# Patient Record
Sex: FEMALE | Race: WHITE | NOT HISPANIC OR LATINO | Employment: FULL TIME | URBAN - METROPOLITAN AREA
[De-identification: names, ages, dates, MRNs, and addresses within clinical notes are randomized per-mention and may not be internally consistent; named-entity substitution may affect disease eponyms.]

---

## 2018-11-22 ENCOUNTER — APPOINTMENT (OUTPATIENT)
Dept: CT IMAGING | Facility: CLINIC | Age: 48
End: 2018-11-22
Attending: EMERGENCY MEDICINE
Payer: COMMERCIAL

## 2018-11-22 ENCOUNTER — HOSPITAL ENCOUNTER (EMERGENCY)
Facility: CLINIC | Age: 48
Discharge: HOME | End: 2018-11-22
Attending: EMERGENCY MEDICINE | Admitting: EMERGENCY MEDICINE
Payer: COMMERCIAL

## 2018-11-22 VITALS
HEIGHT: 67 IN | OXYGEN SATURATION: 96 % | SYSTOLIC BLOOD PRESSURE: 123 MMHG | TEMPERATURE: 98.1 F | DIASTOLIC BLOOD PRESSURE: 80 MMHG | HEART RATE: 57 BPM | WEIGHT: 140 LBS | BODY MASS INDEX: 21.97 KG/M2

## 2018-11-22 DIAGNOSIS — N23 RENAL COLIC ON RIGHT SIDE: ICD-10-CM

## 2018-11-22 DIAGNOSIS — N20.1 URETEROLITHIASIS: ICD-10-CM

## 2018-11-22 LAB
ALBUMIN SERPL-MCNC: 4.3 G/DL (ref 3.4–5)
ALBUMIN UR-MCNC: 100 MG/DL
ALP SERPL-CCNC: 43 U/L (ref 40–150)
ALT SERPL W P-5'-P-CCNC: 16 U/L (ref 0–50)
ANION GAP SERPL CALCULATED.3IONS-SCNC: 6 MMOL/L (ref 3–14)
APPEARANCE UR: ABNORMAL
AST SERPL W P-5'-P-CCNC: 16 U/L (ref 0–45)
BACTERIA #/AREA URNS HPF: ABNORMAL /HPF
BASOPHILS # BLD AUTO: 0.1 10E9/L (ref 0–0.2)
BASOPHILS NFR BLD AUTO: 0.8 %
BILIRUB SERPL-MCNC: 0.3 MG/DL (ref 0.2–1.3)
BILIRUB UR QL STRIP: NEGATIVE
BUN SERPL-MCNC: 18 MG/DL (ref 7–30)
CALCIUM SERPL-MCNC: 8.4 MG/DL (ref 8.5–10.1)
CHLORIDE SERPL-SCNC: 105 MMOL/L (ref 94–109)
CO2 SERPL-SCNC: 29 MMOL/L (ref 20–32)
COLOR UR AUTO: YELLOW
CREAT SERPL-MCNC: 1.01 MG/DL (ref 0.52–1.04)
DIFFERENTIAL METHOD BLD: NORMAL
EOSINOPHIL # BLD AUTO: 0 10E9/L (ref 0–0.7)
EOSINOPHIL NFR BLD AUTO: 0.5 %
ERYTHROCYTE [DISTWIDTH] IN BLOOD BY AUTOMATED COUNT: 12.4 % (ref 10–15)
GFR SERPL CREATININE-BSD FRML MDRD: 59 ML/MIN/1.7M2
GLUCOSE SERPL-MCNC: 109 MG/DL (ref 70–99)
GLUCOSE UR STRIP-MCNC: NEGATIVE MG/DL
HCT VFR BLD AUTO: 38.3 % (ref 35–47)
HGB BLD-MCNC: 12.6 G/DL (ref 11.7–15.7)
HGB UR QL STRIP: ABNORMAL
IMM GRANULOCYTES # BLD: 0 10E9/L (ref 0–0.4)
IMM GRANULOCYTES NFR BLD: 0.3 %
KETONES UR STRIP-MCNC: 5 MG/DL
LEUKOCYTE ESTERASE UR QL STRIP: ABNORMAL
LYMPHOCYTES # BLD AUTO: 1.9 10E9/L (ref 0.8–5.3)
LYMPHOCYTES NFR BLD AUTO: 24.5 %
MCH RBC QN AUTO: 30.7 PG (ref 26.5–33)
MCHC RBC AUTO-ENTMCNC: 32.9 G/DL (ref 31.5–36.5)
MCV RBC AUTO: 93 FL (ref 78–100)
MONOCYTES # BLD AUTO: 0.4 10E9/L (ref 0–1.3)
MONOCYTES NFR BLD AUTO: 5.4 %
MUCOUS THREADS #/AREA URNS LPF: PRESENT /LPF
NEUTROPHILS # BLD AUTO: 5.3 10E9/L (ref 1.6–8.3)
NEUTROPHILS NFR BLD AUTO: 68.5 %
NITRATE UR QL: NEGATIVE
NRBC # BLD AUTO: 0 10*3/UL
NRBC BLD AUTO-RTO: 0 /100
PH UR STRIP: 5 PH (ref 5–7)
PLATELET # BLD AUTO: 207 10E9/L (ref 150–450)
POTASSIUM SERPL-SCNC: 3.6 MMOL/L (ref 3.4–5.3)
PROT SERPL-MCNC: 7.4 G/DL (ref 6.8–8.8)
RBC # BLD AUTO: 4.1 10E12/L (ref 3.8–5.2)
RBC #/AREA URNS AUTO: >182 /HPF (ref 0–2)
SODIUM SERPL-SCNC: 140 MMOL/L (ref 133–144)
SOURCE: ABNORMAL
SP GR UR STRIP: 1.03 (ref 1–1.03)
SQUAMOUS #/AREA URNS AUTO: 1 /HPF (ref 0–1)
UROBILINOGEN UR STRIP-MCNC: 2 MG/DL (ref 0–2)
WBC # BLD AUTO: 7.8 10E9/L (ref 4–11)
WBC #/AREA URNS AUTO: 10 /HPF (ref 0–5)

## 2018-11-22 PROCEDURE — 80053 COMPREHEN METABOLIC PANEL: CPT | Performed by: EMERGENCY MEDICINE

## 2018-11-22 PROCEDURE — 25000128 H RX IP 250 OP 636

## 2018-11-22 PROCEDURE — 96375 TX/PRO/DX INJ NEW DRUG ADDON: CPT

## 2018-11-22 PROCEDURE — 99285 EMERGENCY DEPT VISIT HI MDM: CPT | Mod: 25

## 2018-11-22 PROCEDURE — 25000132 ZZH RX MED GY IP 250 OP 250 PS 637: Performed by: EMERGENCY MEDICINE

## 2018-11-22 PROCEDURE — 81001 URINALYSIS AUTO W/SCOPE: CPT | Performed by: EMERGENCY MEDICINE

## 2018-11-22 PROCEDURE — 96361 HYDRATE IV INFUSION ADD-ON: CPT

## 2018-11-22 PROCEDURE — 74176 CT ABD & PELVIS W/O CONTRAST: CPT

## 2018-11-22 PROCEDURE — 85025 COMPLETE CBC W/AUTO DIFF WBC: CPT | Performed by: EMERGENCY MEDICINE

## 2018-11-22 PROCEDURE — 25000128 H RX IP 250 OP 636: Performed by: EMERGENCY MEDICINE

## 2018-11-22 PROCEDURE — 96374 THER/PROPH/DIAG INJ IV PUSH: CPT

## 2018-11-22 RX ORDER — MORPHINE SULFATE 4 MG/ML
4 INJECTION, SOLUTION INTRAMUSCULAR; INTRAVENOUS
Status: DISCONTINUED | OUTPATIENT
Start: 2018-11-22 | End: 2018-11-22 | Stop reason: HOSPADM

## 2018-11-22 RX ORDER — ONDANSETRON 4 MG/1
4 TABLET, ORALLY DISINTEGRATING ORAL EVERY 6 HOURS PRN
Qty: 12 TABLET | Refills: 0 | Status: SHIPPED | OUTPATIENT
Start: 2018-11-22 | End: 2018-11-25

## 2018-11-22 RX ORDER — KETOROLAC TROMETHAMINE 15 MG/ML
15 INJECTION, SOLUTION INTRAMUSCULAR; INTRAVENOUS ONCE
Status: COMPLETED | OUTPATIENT
Start: 2018-11-22 | End: 2018-11-22

## 2018-11-22 RX ORDER — ONDANSETRON 2 MG/ML
4 INJECTION INTRAMUSCULAR; INTRAVENOUS
Status: COMPLETED | OUTPATIENT
Start: 2018-11-22 | End: 2018-11-22

## 2018-11-22 RX ORDER — OXYCODONE AND ACETAMINOPHEN 5; 325 MG/1; MG/1
1-2 TABLET ORAL EVERY 4 HOURS PRN
Qty: 15 TABLET | Refills: 0 | Status: SHIPPED | OUTPATIENT
Start: 2018-11-22 | End: 2023-04-03

## 2018-11-22 RX ORDER — ONDANSETRON 2 MG/ML
INJECTION INTRAMUSCULAR; INTRAVENOUS
Status: COMPLETED
Start: 2018-11-22 | End: 2018-11-22

## 2018-11-22 RX ORDER — KETOROLAC TROMETHAMINE 15 MG/ML
INJECTION, SOLUTION INTRAMUSCULAR; INTRAVENOUS
Status: COMPLETED
Start: 2018-11-22 | End: 2018-11-22

## 2018-11-22 RX ORDER — MORPHINE SULFATE 4 MG/ML
INJECTION, SOLUTION INTRAMUSCULAR; INTRAVENOUS
Status: COMPLETED
Start: 2018-11-22 | End: 2018-11-22

## 2018-11-22 RX ORDER — TAMSULOSIN HYDROCHLORIDE 0.4 MG/1
0.4 CAPSULE ORAL ONCE
Status: COMPLETED | OUTPATIENT
Start: 2018-11-22 | End: 2018-11-22

## 2018-11-22 RX ADMIN — KETOROLAC TROMETHAMINE 15 MG: 15 INJECTION, SOLUTION INTRAMUSCULAR; INTRAVENOUS at 01:09

## 2018-11-22 RX ADMIN — MORPHINE SULFATE 4 MG: 4 INJECTION, SOLUTION INTRAMUSCULAR; INTRAVENOUS at 01:09

## 2018-11-22 RX ADMIN — ONDANSETRON 4 MG: 2 INJECTION INTRAMUSCULAR; INTRAVENOUS at 01:09

## 2018-11-22 RX ADMIN — SODIUM CHLORIDE 1000 ML: 9 INJECTION, SOLUTION INTRAVENOUS at 01:08

## 2018-11-22 RX ADMIN — MORPHINE SULFATE 4 MG: 4 INJECTION INTRAVENOUS at 01:09

## 2018-11-22 RX ADMIN — TAMSULOSIN HYDROCHLORIDE 0.4 MG: 0.4 CAPSULE ORAL at 02:21

## 2018-11-22 ASSESSMENT — ENCOUNTER SYMPTOMS
VOMITING: 1
ABDOMINAL PAIN: 1
FREQUENCY: 1
DIARRHEA: 0
CONSTIPATION: 0

## 2018-11-22 NOTE — ED PROVIDER NOTES
"  History     Chief Complaint:  Abdominal Pain      HPI   Jenni Torres is a 48 year old female who presents with her  for evaluation of abdominal pain. Per the patient's reports, she experienced sudden onset of right lower quadrant pain around 10 PM. Patient has been vomiting since the onset of her pain and has had frequency as well. She denies diarrhea, constipation, or abnormal vaginal bleeding/discharge. Of note, the patient is currently in her menstrual cycle. Patient denies having abdominal surgeries and cannot find a comfortable position to sit.       Allergies:  No known drug allergies     Medications:    Keppra  Synthroid  Sertraline    Past Medical History:    Hyperthyroidism   Seizures    Past Surgical History:    History reviewed. No pertinent surgical history.    Family History:    History reviewed. No pertinent family history.     Social History:  Smoking status: Never smoker  Alcohol use: No  Marital Status:          Review of Systems   Gastrointestinal: Positive for abdominal pain and vomiting. Negative for constipation and diarrhea.   Genitourinary: Positive for frequency. Negative for vaginal bleeding and vaginal discharge.   All other systems reviewed and are negative.      Physical Exam     Patient Vitals for the past 24 hrs:   BP Temp Temp src Heart Rate SpO2 Height Weight   11/22/18 0055 123/80 - - - - - -   11/22/18 0052 - - - 57 100 % - -   11/22/18 0051 - 98.1  F (36.7  C) Oral - - 1.702 m (5' 7\") 63.5 kg (140 lb)       Physical Exam  Nursing note and vitals reviewed.  Constitutional: Cooperative, diaphoretic, uncomfortable appearing   HENT:   Mouth/Throat: Mucous membranes are normal.   Cardiovascular: Normal rate, regular rhythm and normal heart sounds.  No murmur.  Pulmonary/Chest: Effort normal and breath sounds normal. No respiratory distress. No wheezes. No rales.   Abdominal: Soft. Normal appearance. No distension. There is no tenderness.   Neurological: Alert. Oriented " x4  Skin: Skin is warm and dry.    Psychiatric: Normal mood and affect.     Emergency Department Course     Imaging:  Radiographic findings were communicated with the patient who voiced understanding of the findings.    CT abdomen pelvis without contrast (stone protocol)  IMPRESSION:  1. Mild right hydronephrosis due to a 0.3 cm UVJ stone.  2. Incidental tiny left lung nodule of doubtful significance, though technically indeterminate. See below.    Laboratory:  CBC: WNL (WBC 7.8, HGB 12.6, )  CMP: Glucose 109 (H), GFR 59 (L), Calcium 8.4 (L), (Creatinine 1.01)    UA: Urine blood large, Protein albumin 100, Urineketon 5, Leukocyte esterase trace, WBC/HPF 10 (H), RBC/HPF >182 (H), Bacteria few, Mucous present, o/w negative.     Interventions:  0108 NaCl BOLUS 1000 ml IV  0109 Morphine 4 mg IV  0109 Toradol 15 mg IV  0109 Zofran 4 mg IV    Emergency Department Course:  Past medical records, nursing notes, and vitals reviewed.  0051: I performed an exam of the patient and obtained history, as documented above.    IV inserted and blood drawn.    The patient was sent for a CT abdomen pelvis without contrast while in the emergency department, findings above.    0320: I rechecked the patient. Findings and plan explained to the Patient. Patient discharged home with instructions regarding supportive care, medications, and reasons to return. The importance of close follow-up was reviewed.       Impression & Plan      Medical Decision Making:  Jenni Torres is a 48 year old female who presents with flank pain.  A broad differential diagnosis was considered including diverticulitis, ureterolithiasis, tumor, colitis, cholecystitis, aneurysm, dissection, volvulus, appendicitis, splenic issue, stomach pathology, ulcer, hydronephrosis, pneumonia, rib fracture, UTI, pyelonephritis, ectopic, ovarian cyst or torsion and pregnancy amongst many other etiologies.  Signs and symptoms consistent with ureterolithiasis.  Patients pain  is controlled in ED and given flomax.  No signs of infected stone.  Patient is hemodynamically stable in ED. Plan is home with abdominal pain recheck by primary care physician or return to ED if symptoms worsen.  Return for fevers greater than 102, increasing pain, other new symptoms develop.  Ureterolithiasis precautions for home.  Questions were answered.       Diagnosis:    ICD-10-CM    1. Renal colic on right side N23    2. Ureterolithiasis N20.1        Disposition:  discharged to home    Discharge Medications:  New Prescriptions    ONDANSETRON (ZOFRAN ODT) 4 MG ODT TAB    Take 1 tablet (4 mg) by mouth every 6 hours as needed for nausea    OXYCODONE-ACETAMINOPHEN (PERCOCET) 5-325 MG TABLET    Take 1-2 tablets by mouth every 4 hours as needed for pain         Dwayne Earl  11/22/2018   [facility] EMERGENCY DEPARTMENT    Scribe Disclosure:  I, Dwayne Earl, am serving as a scribe at 12:51 AM on 11/22/2018 to document services personally performed by Vikram Don MD based on my observations and the provider's statements to me.        Vikram Don MD  11/22/18 0414

## 2018-11-22 NOTE — ED AVS SNAPSHOT
[facility] Emergency Department    [address]    [facility] [address]    Phone:  [phone]    Fax:  [phone]                                       Jenni Torres   MRN: [id number]    Department:  [facility] Emergency Department   Date of Visit:  11/22/2018           After Visit Summary Signature Page     I have received my discharge instructions, and my questions have been answered. I have discussed any challenges I see with this plan with the nurse or doctor.    ..........................................................................................................................................  Patient/Patient Representative Signature      ..........................................................................................................................................  Patient Representative Print Name and Relationship to Patient    ..................................................               ................................................  Date                                   Time    ..........................................................................................................................................  Reviewed by Signature/Title    ...................................................              ..............................................  Date                                               Time          22EPIC Rev 08/18

## 2018-11-22 NOTE — ED AVS SNAPSHOT
[facility] Emergency Department    [address] [place] [address]    Phone:  [phone]    Fax:  [phone]                                       Jenni Torres   MRN: [id number]    Department:  [facility] Emergency Department   Date of Visit:  11/22/2018           Patient Information     Date Of Birth          1970        Your diagnoses for this visit were:     Renal colic on right side     Ureterolithiasis        You were seen by Vikram Don MD.      Follow-up Information     Follow up with [facility]    Why:  As needed    Contact information:    [address] [facility]  [place] [address]  [phone]          Discharge Instructions       Discharge Instructions  Kidney Stones    Kidney stones are a common problem that can cause a lot of pain but fortunately are usually not dangerous. Kidney stones form in the kidney and then can cause a blockage (obstruction) of the flow of urine from the kidney which leads to pain. Most patients can manage kidney stones at home (without a hospital stay).  However, sometimes your condition may be worse than it seemed at first, or may get worse with time. Most kidney stones will pass on their own, but occasionally stones may need to be removed by an urologist.    Generally, every Emergency Department visit should have a follow-up clinic visit with either a primary or a specialty clinic/provider. Please follow-up as instructed by your emergency provider today.      Return to the Emergency Department if:    Your pain is not controlled despite the medications provided or recommended.    You are vomiting (throwing up) and cannot keep fluids or medications down.    You develop a fever (>100.4 F).    You feel much more ill or develop new symptoms.  What can I do to help myself?    Be sure to drink plenty of fluids.    If instructed to do so, strain your urine (pee) with the urine strainer you were provided with today. Your  stone may look like a grain of sand or a small pebble. Collect any stones in the cup provided and bring to your follow-up appointment.    Staying active is good, and may help the stone to pass. You may do whatever you feel up to doing without restrictions.   Treatment:    Non-steroidal anti-inflammatory drugs (NSAIDs). This includes prescription medicines like Toradol  (ketorolac) and non-prescription medicines like Advil  (ibuprofen) and Nuprin  (ibuprofen) and Naproxen. These pain relievers are very effective for kidney stones.    Nausea (sick to your stomach) medication.  Nausea and vomiting are common with kidney stones, so your provider may send you home with medicine for this.     Flomax  (tamsulosin). This medicine is sometimes used for men with prostate problems, but also can help kidney stones to pass. Its effectiveness is controversial or questionable so it is prescribed in certain situations. This medicine can lower blood pressure, and you may feel faint/lightheaded, especially when you first stand up. Be sure to get up gradually, sit down if you feel faint, and avoid activity where feeling faint would be dangerous, such as climbing ladders.  If you were given a prescription for medicine here today, be sure to read all of the information (including the package insert) that comes with your prescription.  This will include important information about the medicine, its side effects, and any warnings that you need to know about.  The pharmacist who fills the prescription can provide more information and answer questions you may have about the medicine.  If you have questions or concerns that the pharmacist cannot address, please call or return to the Emergency Department.   Remember that you can always come back to the Emergency Department if you are not able to see your regular provider in the amount of time listed above, if you get any new symptoms, or if there is anything that worries you.      24 Hour  Appointment Hotline       To make an appointment at any [facility], call [phone] ([phone]). If you don't have a family doctor or clinic, we will help you find one. [facility] clinics are conveniently located to serve the needs of you and your family.             Review of your medicines      START taking        Dose / Directions Last dose taken    ondansetron 4 MG ODT tab   Commonly known as:  ZOFRAN ODT   Dose:  4 mg   Quantity:  12 tablet        Take 1 tablet (4 mg) by mouth every 6 hours as needed for nausea   Refills:  0        oxyCODONE-acetaminophen 5-325 MG tablet   Commonly known as:  PERCOCET   Dose:  1-2 tablet   Quantity:  15 tablet        Take 1-2 tablets by mouth every 4 hours as needed for pain   Refills:  0          Our records show that you are taking the medicines listed below. If these are incorrect, please call your family doctor or clinic.        Dose / Directions Last dose taken    KEPPRA PO        Refills:  0        SERTRALINE HCL PO        Take by mouth daily   Refills:  0        SYNTHROID PO        Refills:  0                Information about OPIOIDS     PRESCRIPTION OPIOIDS: WHAT YOU NEED TO KNOW   We gave you an opioid (narcotic) pain medicine. It is important to manage your pain, but opioids are not always the best choice. You should first try all the other options your care team gave you. Take this medicine for as short a time (and as few doses) as possible.    Some activities can increase your pain, such as bandage changes or therapy sessions. It may help to take your pain medicine 30 to 60 minutes before these activities. Reduce your stress by getting enough sleep, working on hobbies you enjoy and practicing relaxation or meditation. Talk to your care team about ways to manage your pain beyond prescription opioids.    These medicines have risks:    DO NOT drive when on new or higher doses of pain medicine. These medicines can affect your alertness and reaction times,  and you could be arrested for driving under the influence (DUI). If you need to use opioids long-term, talk to your care team about driving.    DO NOT operate heavy machinery    DO NOT do any other dangerous activities while taking these medicines.    DO NOT drink any alcohol while taking these medicines.     If the opioid prescribed includes acetaminophen, DO NOT take with any other medicines that contain acetaminophen. Read all labels carefully. Look for the word  acetaminophen  or  Tylenol.  Ask your pharmacist if you have questions or are unsure.    You can get addicted to pain medicines, especially if you have a history of addiction (chemical, alcohol or substance dependence). Talk to your care team about ways to reduce this risk.    All opioids tend to cause constipation. Drink plenty of water and eat foods that have a lot of fiber, such as fruits, vegetables, prune juice, apple juice and high-fiber cereal. Take a laxative (Miralax, milk of magnesia, Colace, Senna) if you don t move your bowels at least every other day. Other side effects include upset stomach, sleepiness, dizziness, throwing up, tolerance (needing more of the medicine to have the same effect), physical dependence and slowed breathing.    Store your pills in a secure place, locked if possible. We will not replace any lost or stolen medicine. If you don t finish your medicine, please throw away (dispose) as directed by your pharmacist. The [place] Pollution [employer] has more information about safe disposal: [email or web address]://[email or web address]/living-green/[email or web address]-medications        Prescriptions were sent or printed at these locations (2 Prescriptions)                   Other Prescriptions                Printed at Department/Unit printer (2 of 2)         ondansetron (ZOFRAN ODT) 4 MG ODT tab               oxyCODONE-acetaminophen (PERCOCET) 5-325 MG tablet                Procedures and tests performed during your visit     CBC with  platelets differential    CT Abdomen Pelvis without Contrast (stone protocol)    Comprehensive metabolic panel    Peripheral IV: Standard    Pulse oximetry nursing    UA with Microscopic      Orders Needing Specimen Collection     None      Pending Results     No orders found from 11/20/2018 to 11/23/2018.            Pending Culture Results     No orders found from 11/20/2018 to 11/23/2018.            Pending Results Instructions     If you had any lab results that were not finalized at the time of your Discharge, you can call the ED Lab Result RN at [phone]. You will be contacted by this team for any positive Lab results or changes in treatment. The nurses are available 7 days a week from 10A to 6:30P.  You can leave a message 24 hours per day and they will return your call.        Test Results From Your Hospital Stay        11/22/2018  3:10 AM      Component Results     Component Value Ref Range & Units Status    Color Urine Yellow  Final    Appearance Urine Slightly Cloudy  Final    Glucose Urine Negative NEG^Negative mg/dL Final    Bilirubin Urine Negative NEG^Negative Final    Ketones Urine 5 (A) NEG^Negative mg/dL Final    Specific Gravity Urine 1.030 1.003 - 1.035 Final    Blood Urine Large (A) NEG^Negative Final    pH Urine 5.0 5.0 - 7.0 pH Final    Protein Albumin Urine 100 (A) NEG^Negative mg/dL Final    Urobilinogen mg/dL 2.0 0.0 - 2.0 mg/dL Final    Nitrite Urine Negative NEG^Negative Final    Leukocyte Esterase Urine Trace (A) NEG^Negative Final    Source Midstream Urine  Final    WBC Urine 10 (H) 0 - 5 /HPF Final    RBC Urine >182 (H) 0 - 2 /HPF Final    Bacteria Urine Few (A) NEG^Negative /HPF Final    Squamous Epithelial /HPF Urine 1 0 - 1 /HPF Final    Mucous Urine Present (A) NEG^Negative /LPF Final         11/22/2018  1:28 AM      Component Results     Component Value Ref Range & Units Status    WBC 7.8 4.0 - 11.0 10e9/L Final    RBC Count 4.10 3.8 - 5.2 10e12/L Final    Hemoglobin 12.6 11.7  - 15.7 g/dL Final    Hematocrit 38.3 35.0 - 47.0 % Final    MCV 93 78 - 100 fl Final    MCH 30.7 26.5 - 33.0 pg Final    MCHC 32.9 31.5 - 36.5 g/dL Final    RDW 12.4 10.0 - 15.0 % Final    Platelet Count 207 150 - 450 10e9/L Final    Diff Method Automated Method  Final    % Neutrophils 68.5 % Final    % Lymphocytes 24.5 % Final    % Monocytes 5.4 % Final    % Eosinophils 0.5 % Final    % Basophils 0.8 % Final    % Immature Granulocytes 0.3 % Final    Nucleated RBCs 0 0 /100 Final    Absolute Neutrophil 5.3 1.6 - 8.3 10e9/L Final    Absolute Lymphocytes 1.9 0.8 - 5.3 10e9/L Final    Absolute Monocytes 0.4 0.0 - 1.3 10e9/L Final    Absolute Eosinophils 0.0 0.0 - 0.7 10e9/L Final    Absolute Basophils 0.1 0.0 - 0.2 10e9/L Final    Abs Immature Granulocytes 0.0 0 - 0.4 10e9/L Final    Absolute Nucleated RBC 0.0  Final         11/22/2018  1:45 AM      Component Results     Component Value Ref Range & Units Status    Sodium 140 133 - 144 mmol/L Final    Potassium 3.6 3.4 - 5.3 mmol/L Final    Chloride 105 94 - 109 mmol/L Final    Carbon Dioxide 29 20 - 32 mmol/L Final    Anion Gap 6 3 - 14 mmol/L Final    Glucose 109 (H) 70 - 99 mg/dL Final    Urea Nitrogen 18 7 - 30 mg/dL Final    Creatinine 1.01 0.52 - 1.04 mg/dL Final    GFR Estimate 59 (L) >60 mL/min/1.7m2 Final    Non  GFR Calc    GFR Estimate If Black 71 >60 mL/min/1.7m2 Final    African American GFR Calc    Calcium 8.4 (L) 8.5 - 10.1 mg/dL Final    Bilirubin Total 0.3 0.2 - 1.3 mg/dL Final    Albumin 4.3 3.4 - 5.0 g/dL Final    Protein Total 7.4 6.8 - 8.8 g/dL Final    Alkaline Phosphatase 43 40 - 150 U/L Final    ALT 16 0 - 50 U/L Final    AST 16 0 - 45 U/L Final         11/22/2018  2:18 AM      Narrative     CT ABDOMEN PELVIS W/O CONTRAST  11/22/2018 1:57 AM     INDICATION: Abdominal pain - right flank.       TECHNIQUE: Thin axial images through the abdomen and pelvis without  contrast. Coronal reformatted images. Radiation dose for this scan  was  reduced using automated exposure control, adjustment of the mA and/or  kV according to patient size, or iterative reconstruction technique.    COMPARISON: None.    FINDINGS: Mild right hydronephrosis due to a 0.3 cm UVJ stone. No  left-sided urinary calculi. The upper abdominal organs are otherwise  negative without contrast.    Tiny 0.2 cm left lower lung nodule (series 2, image 1).    Uterus is present. No suspicious pelvic masses. No bowel obstruction  or other acute findings. No destructive bone lesions.        Impression     IMPRESSION:  1. Mild right hydronephrosis due to a 0.3 cm UVJ stone.  2. Incidental tiny left lung nodule of doubtful significance, though  technically indeterminate. See below.    Recommendations for one or multiple incidental lung nodules < 6mm :    Low risk patients: No routine follow-up.    High risk patients: Optional follow-up CT at twelve months; if  unchanged, no further follow-up.    *Low Risk: Minimal or absent history of smoking or other known risk  factors.  *Nonsolid (ground-glass) or partly solid nodules may require longer  follow-up to exclude indolent adenocarcinoma.  *Recommendations based on Guidelines for the Management of Incidental  Pulmonary Nodules Detected at CT: From the Fleischner Society 2017,  Radiology 2017.    MARCELLA SHETH MD                Clinical Quality Measure: Blood Pressure Screening     Your blood pressure was checked while you were in the emergency department today. The last reading we obtained was  BP: 123/80 . Please read the guidelines below about what these numbers mean and what you should do about them.  If your systolic blood pressure (the top number) is less than 120 and your diastolic blood pressure (the bottom number) is less than 80, then your blood pressure is normal. There is nothing more that you need to do about it.  If your systolic blood pressure (the top number) is 120-139 or your diastolic blood pressure (the bottom number)  "is 80-89, your blood pressure may be higher than it should be. You should have your blood pressure rechecked within a year by a primary care provider.  If your systolic blood pressure (the top number) is 140 or greater or your diastolic blood pressure (the bottom number) is 90 or greater, you may have high blood pressure. High blood pressure is treatable, but if left untreated over time it can put you at risk for heart attack, stroke, or kidney failure. You should have your blood pressure rechecked by a primary care provider within the next 4 weeks.  If your provider in the emergency department today gave you specific instructions to follow-up with your doctor or provider even sooner than that, you should follow that instruction and not wait for up to 4 weeks for your follow-up visit.        Thank you for choosing [facility]       Thank you for choosing [facility] for your care. Our goal is always to provide you with excellent care. Hearing back from our patients is one way we can continue to improve our services. Please take a few minutes to complete the written survey that you may receive in the mail after you visit with us. Thank you!        [employer] Information     [employer] lets you send messages to your doctor, view your test results, renew your prescriptions, schedule appointments and more. To sign up, go to [email or web address] . Click on \"Log in\" on the left side of the screen, which will take you to the Welcome page. Then click on \"Sign up Now\" on the right side of the page.     You will be asked to enter the access code listed below, as well as some personal information. Please follow the directions to create your username and password.     Your access code is: [email or web address]  Expires: 2019  3:20 AM     Your access code will  in 90 days. If you need help or a new code, please call your [facility] clinic or [phone].        Care EveryWhere ID     This is your Care EveryWhere ID. This could be used " by other organizations to access your [facility] medical records  [id number]        Equal Access to Services     YESICA CLARKE : Dileep Leigh, veronique bermudez, monica garrett. So [facility] [phone].    ATENCIÓN: Si habla español, tiene a marks disposición servicios gratuitos de asistencia lingüística. Llame al [phone].    We comply with applicable federal civil rights laws and [place] laws. We do not discriminate on the basis of race, color, national origin, age, disability, sex, sexual orientation, or gender identity.            After Visit Summary       This is your record. Keep this with you and show to your community pharmacist(s) and doctor(s) at your next visit.

## 2018-11-22 NOTE — ED NOTES
Gave pt urine strainer. Discussed medications and importance of drinking lots of fluids. Pt and  understand teaching

## 2018-11-22 NOTE — ED NOTES
Bladder scan 0mL at this time. Pt given water to drink and IV fluids infusing quickly. Will attempt to collect urine later.

## 2021-09-28 ENCOUNTER — TRANSFERRED RECORDS (OUTPATIENT)
Dept: MULTI SPECIALTY CLINIC | Facility: CLINIC | Age: 51
End: 2021-09-28

## 2021-09-28 LAB
HPV ABSTRACT: NORMAL
PAP-ABSTRACT: NORMAL

## 2022-08-01 ENCOUNTER — TRANSFERRED RECORDS (OUTPATIENT)
Dept: MULTI SPECIALTY CLINIC | Facility: CLINIC | Age: 52
End: 2022-08-01

## 2023-04-03 ENCOUNTER — OFFICE VISIT (OUTPATIENT)
Dept: PEDIATRICS | Facility: CLINIC | Age: 53
End: 2023-04-03
Payer: COMMERCIAL

## 2023-04-03 VITALS
RESPIRATION RATE: 20 BRPM | BODY MASS INDEX: 21.2 KG/M2 | HEIGHT: 68 IN | HEART RATE: 62 BPM | WEIGHT: 139.9 LBS | SYSTOLIC BLOOD PRESSURE: 95 MMHG | TEMPERATURE: 98.1 F | DIASTOLIC BLOOD PRESSURE: 60 MMHG | OXYGEN SATURATION: 98 %

## 2023-04-03 DIAGNOSIS — Z13.220 LIPID SCREENING: ICD-10-CM

## 2023-04-03 DIAGNOSIS — R56.9 PARTIAL SEIZURES (H): ICD-10-CM

## 2023-04-03 DIAGNOSIS — E03.9 ACQUIRED HYPOTHYROIDISM: ICD-10-CM

## 2023-04-03 DIAGNOSIS — Z00.00 ROUTINE GENERAL MEDICAL EXAMINATION AT A HEALTH CARE FACILITY: Primary | ICD-10-CM

## 2023-04-03 DIAGNOSIS — H93.13 TINNITUS OF BOTH EARS: ICD-10-CM

## 2023-04-03 DIAGNOSIS — Z30.09 GENERAL COUNSELING FOR PRESCRIPTION OF ORAL CONTRACEPTIVES: ICD-10-CM

## 2023-04-03 DIAGNOSIS — Z11.59 NEED FOR HEPATITIS C SCREENING TEST: ICD-10-CM

## 2023-04-03 DIAGNOSIS — Z12.31 VISIT FOR SCREENING MAMMOGRAM: ICD-10-CM

## 2023-04-03 DIAGNOSIS — F33.0 MILD EPISODE OF RECURRENT MAJOR DEPRESSIVE DISORDER (H): ICD-10-CM

## 2023-04-03 DIAGNOSIS — Z11.4 SCREENING FOR HIV (HUMAN IMMUNODEFICIENCY VIRUS): ICD-10-CM

## 2023-04-03 DIAGNOSIS — B00.1 RECURRENT COLD SORES: ICD-10-CM

## 2023-04-03 LAB
ALBUMIN SERPL BCG-MCNC: 4.2 G/DL (ref 3.5–5.2)
ALP SERPL-CCNC: 43 U/L (ref 35–104)
ALT SERPL W P-5'-P-CCNC: 7 U/L (ref 10–35)
ANION GAP SERPL CALCULATED.3IONS-SCNC: 9 MMOL/L (ref 7–15)
AST SERPL W P-5'-P-CCNC: 23 U/L (ref 10–35)
BILIRUB SERPL-MCNC: 0.4 MG/DL
BUN SERPL-MCNC: 13.4 MG/DL (ref 6–20)
CALCIUM SERPL-MCNC: 8.8 MG/DL (ref 8.6–10)
CHLORIDE SERPL-SCNC: 106 MMOL/L (ref 98–107)
CHOLEST SERPL-MCNC: 189 MG/DL
CREAT SERPL-MCNC: 0.95 MG/DL (ref 0.51–0.95)
DEPRECATED HCO3 PLAS-SCNC: 25 MMOL/L (ref 22–29)
GFR SERPL CREATININE-BSD FRML MDRD: 72 ML/MIN/1.73M2
GLUCOSE SERPL-MCNC: 80 MG/DL (ref 70–99)
HDLC SERPL-MCNC: 64 MG/DL
LDLC SERPL CALC-MCNC: 114 MG/DL
NONHDLC SERPL-MCNC: 125 MG/DL
POTASSIUM SERPL-SCNC: 4.6 MMOL/L (ref 3.4–5.3)
PROT SERPL-MCNC: 6.6 G/DL (ref 6.4–8.3)
SODIUM SERPL-SCNC: 140 MMOL/L (ref 136–145)
TRIGL SERPL-MCNC: 54 MG/DL
TSH SERPL DL<=0.005 MIU/L-ACNC: 0.8 UIU/ML (ref 0.3–4.2)

## 2023-04-03 PROCEDURE — 86803 HEPATITIS C AB TEST: CPT | Performed by: INTERNAL MEDICINE

## 2023-04-03 PROCEDURE — 80053 COMPREHEN METABOLIC PANEL: CPT | Performed by: INTERNAL MEDICINE

## 2023-04-03 PROCEDURE — 99214 OFFICE O/P EST MOD 30 MIN: CPT | Mod: 25 | Performed by: INTERNAL MEDICINE

## 2023-04-03 PROCEDURE — 36415 COLL VENOUS BLD VENIPUNCTURE: CPT | Performed by: INTERNAL MEDICINE

## 2023-04-03 PROCEDURE — 99386 PREV VISIT NEW AGE 40-64: CPT | Mod: 25 | Performed by: INTERNAL MEDICINE

## 2023-04-03 PROCEDURE — 87389 HIV-1 AG W/HIV-1&-2 AB AG IA: CPT | Performed by: INTERNAL MEDICINE

## 2023-04-03 PROCEDURE — 80061 LIPID PANEL: CPT | Performed by: INTERNAL MEDICINE

## 2023-04-03 PROCEDURE — 84443 ASSAY THYROID STIM HORMONE: CPT | Performed by: INTERNAL MEDICINE

## 2023-04-03 RX ORDER — LEVOTHYROXINE SODIUM 137 UG/1
137 TABLET ORAL
COMMUNITY
Start: 2022-12-21 | End: 2023-04-03

## 2023-04-03 RX ORDER — MULTIVITAMIN
1 TABLET ORAL DAILY
COMMUNITY

## 2023-04-03 RX ORDER — LEVETIRACETAM 500 MG/1
500 TABLET ORAL DAILY
Qty: 90 TABLET | Refills: 3 | Status: SHIPPED | OUTPATIENT
Start: 2023-04-03 | End: 2024-05-09

## 2023-04-03 RX ORDER — PREDNISONE 10 MG/1
TABLET ORAL
COMMUNITY
Start: 2022-05-31 | End: 2023-04-03

## 2023-04-03 RX ORDER — VALACYCLOVIR HYDROCHLORIDE 1 G/1
TABLET, FILM COATED ORAL
COMMUNITY
Start: 2022-01-13 | End: 2023-04-03

## 2023-04-03 RX ORDER — ACETAMINOPHEN 160 MG
TABLET,DISINTEGRATING ORAL
COMMUNITY
Start: 2022-07-10 | End: 2024-05-09

## 2023-04-03 RX ORDER — LEVETIRACETAM 500 MG/1
500 TABLET ORAL
COMMUNITY
Start: 2022-12-21 | End: 2023-04-03

## 2023-04-03 RX ORDER — LEVOTHYROXINE SODIUM 137 UG/1
137 TABLET ORAL DAILY
Qty: 90 TABLET | Refills: 3 | Status: SHIPPED | OUTPATIENT
Start: 2023-04-03 | End: 2024-02-26

## 2023-04-03 RX ORDER — TRIAMCINOLONE ACETONIDE 1 MG/G
CREAM TOPICAL
COMMUNITY
Start: 2022-01-12 | End: 2023-04-03

## 2023-04-03 RX ORDER — VALACYCLOVIR HYDROCHLORIDE 1 G/1
2000 TABLET, FILM COATED ORAL 2 TIMES DAILY
Qty: 4 TABLET | Refills: 3 | Status: SHIPPED | OUTPATIENT
Start: 2023-04-03 | End: 2024-05-09

## 2023-04-03 RX ORDER — COVID-19 MOLECULAR TEST ASSAY
KIT MISCELLANEOUS
COMMUNITY
Start: 2023-02-12 | End: 2024-06-19

## 2023-04-03 SDOH — ECONOMIC STABILITY: TRANSPORTATION INSECURITY
IN THE PAST 12 MONTHS, HAS LACK OF TRANSPORTATION KEPT YOU FROM MEETINGS, WORK, OR FROM GETTING THINGS NEEDED FOR DAILY LIVING?: NO

## 2023-04-03 SDOH — ECONOMIC STABILITY: INCOME INSECURITY: IN THE LAST 12 MONTHS, WAS THERE A TIME WHEN YOU WERE NOT ABLE TO PAY THE MORTGAGE OR RENT ON TIME?: NO

## 2023-04-03 SDOH — HEALTH STABILITY: PHYSICAL HEALTH: ON AVERAGE, HOW MANY MINUTES DO YOU ENGAGE IN EXERCISE AT THIS LEVEL?: 50 MIN

## 2023-04-03 SDOH — ECONOMIC STABILITY: TRANSPORTATION INSECURITY
IN THE PAST 12 MONTHS, HAS THE LACK OF TRANSPORTATION KEPT YOU FROM MEDICAL APPOINTMENTS OR FROM GETTING MEDICATIONS?: NO

## 2023-04-03 SDOH — ECONOMIC STABILITY: FOOD INSECURITY: WITHIN THE PAST 12 MONTHS, YOU WORRIED THAT YOUR FOOD WOULD RUN OUT BEFORE YOU GOT MONEY TO BUY MORE.: NEVER TRUE

## 2023-04-03 SDOH — ECONOMIC STABILITY: FOOD INSECURITY: WITHIN THE PAST 12 MONTHS, THE FOOD YOU BOUGHT JUST DIDN'T LAST AND YOU DIDN'T HAVE MONEY TO GET MORE.: NEVER TRUE

## 2023-04-03 SDOH — HEALTH STABILITY: PHYSICAL HEALTH: ON AVERAGE, HOW MANY DAYS PER WEEK DO YOU ENGAGE IN MODERATE TO STRENUOUS EXERCISE (LIKE A BRISK WALK)?: 3 DAYS

## 2023-04-03 SDOH — ECONOMIC STABILITY: INCOME INSECURITY: HOW HARD IS IT FOR YOU TO PAY FOR THE VERY BASICS LIKE FOOD, HOUSING, MEDICAL CARE, AND HEATING?: NOT VERY HARD

## 2023-04-03 ASSESSMENT — LIFESTYLE VARIABLES
HOW OFTEN DO YOU HAVE A DRINK CONTAINING ALCOHOL: 2-3 TIMES A WEEK
HOW MANY STANDARD DRINKS CONTAINING ALCOHOL DO YOU HAVE ON A TYPICAL DAY: 1 OR 2
HOW OFTEN DO YOU HAVE SIX OR MORE DRINKS ON ONE OCCASION: NEVER
SKIP TO QUESTIONS 9-10: 1
AUDIT-C TOTAL SCORE: 3

## 2023-04-03 ASSESSMENT — ENCOUNTER SYMPTOMS
HEARTBURN: 0
DYSURIA: 0
FEVER: 0
ABDOMINAL PAIN: 0
PARESTHESIAS: 0
DIZZINESS: 0
ARTHRALGIAS: 0
JOINT SWELLING: 0
SORE THROAT: 0
HEADACHES: 0
HEMATURIA: 0
BREAST MASS: 0
WEAKNESS: 0
HEMATOCHEZIA: 0
EYE PAIN: 0
PALPITATIONS: 0
COUGH: 0
FREQUENCY: 0
CHILLS: 0
NERVOUS/ANXIOUS: 0
MYALGIAS: 0
NAUSEA: 0
SHORTNESS OF BREATH: 0
CONSTIPATION: 0
DIARRHEA: 0

## 2023-04-03 ASSESSMENT — PAIN SCALES - GENERAL: PAINLEVEL: NO PAIN (0)

## 2023-04-03 ASSESSMENT — SOCIAL DETERMINANTS OF HEALTH (SDOH)
HOW OFTEN DO YOU ATTEND CHURCH OR RELIGIOUS SERVICES?: MORE THAN 4 TIMES PER YEAR
DO YOU BELONG TO ANY CLUBS OR ORGANIZATIONS SUCH AS CHURCH GROUPS UNIONS, FRATERNAL OR ATHLETIC GROUPS, OR SCHOOL GROUPS?: YES
IN A TYPICAL WEEK, HOW MANY TIMES DO YOU TALK ON THE PHONE WITH FAMILY, FRIENDS, OR NEIGHBORS?: MORE THAN THREE TIMES A WEEK
HOW OFTEN DO YOU GET TOGETHER WITH FRIENDS OR RELATIVES?: TWICE A WEEK

## 2023-04-03 NOTE — PATIENT INSTRUCTIONS
IUD:  -- Go to bedsider.org for more information about contraceptive options  -- Benefits of IUD include low maintenance, high efficacy, low exposure to progesterone, no estrogen exposure, decreased or no periods  -- Potential side effects and risks include: cramping (usually in the beginning), abnormal bleeding in the first 3-6 months after insertion, IUD falling out, increased risk of an infection called PID (pelvic inflammatory disease), risk of embedding in the uterine wall and (rarely) perforating  -- Take 600-800 mg of ibuprofen the morning of insertion      Preventive Health Recommendations  Female Ages 50 - 64    Yearly exam: See your health care provider every year in order to  Review health changes.   Discuss preventive care.    Review your medicines if your doctor has prescribed any.    Get a Pap test every three years (unless you have an abnormal result and your provider advises testing more often).  If you get Pap tests with HPV test, you only need to test every 5 years, unless you have an abnormal result.   You do not need a Pap test if your uterus was removed (hysterectomy) and you have not had cancer.  You should be tested each year for STDs (sexually transmitted diseases) if you're at risk.   Have a mammogram every 1 to 2 years.  Have a colonoscopy at age 50, or have a yearly FIT test (stool test). These exams screen for colon cancer.    Have a cholesterol test every 5 years, or more often if advised.  Have a diabetes test (fasting glucose) every three years. If you are at risk for diabetes, you should have this test more often.   If you are at risk for osteoporosis (brittle bone disease), think about having a bone density scan (DEXA).    Shots: Get a flu shot each year. Get a tetanus shot every 10 years.    Nutrition:   Eat at least 5 servings of fruits and vegetables each day.  Eat whole-grain bread, whole-wheat pasta and brown rice instead of white grains and rice.  Get adequate Calcium and  Vitamin D.     Lifestyle  Exercise at least 150 minutes a week (30 minutes a day, 5 days a week). This will help you control your weight and prevent disease.  Limit alcohol to one drink per day.  No smoking.   Wear sunscreen to prevent skin cancer.   See your dentist every six months for an exam and cleaning.  See your eye doctor every 1 to 2 years.

## 2023-04-03 NOTE — PROGRESS NOTES
SUBJECTIVE:   CC: Jenni is an 52 year old who presents for preventive health visit.       4/3/2023     9:11 AM   Additional Questions   Roomed by Jayna Schwab CMA   Accompanied by N/A         4/3/2023     9:11 AM   Patient Reported Additional Medications   Patient reports taking the following new medications N/A   Patient has been advised of split billing requirements and indicates understanding: Yes  Healthy Habits:     Getting at least 3 servings of Calcium per day:  NO    Bi-annual eye exam:  Yes    Dental care twice a year:  Yes    Sleep apnea or symptoms of sleep apnea:  None    Diet:  Regular (no restrictions)    Frequency of exercise:  2-3 days/week    Duration of exercise:  30-45 minutes    Taking medications regularly:  Yes    Medication side effects:  None    PHQ-2 Total Score: 0    Additional concerns today:  Yes    FH of osteoporosis  No unexplained fractures    Hx of seizure - 2006 - nighttime seizure - was followed by neurology    Today's PHQ-2 Score:       4/3/2023     9:04 AM   PHQ-2 ( 1999 [employer])   Q1: Little interest or pleasure in doing things 0   Q2: Feeling down, depressed or hopeless 0   PHQ-2 Score 0   Q1: Little interest or pleasure in doing things Not at all    Not at all   Q2: Feeling down, depressed or hopeless Not at all    Not at all   PHQ-2 Score 0    0       Have you ever done Advance Care Planning? (For example, a Health Directive, POLST, or a discussion with a medical provider or your loved ones about your wishes): No, advance care planning information given to patient to review.  Patient plans to discuss their wishes with loved ones or provider.      Social History     Tobacco Use     Smoking status: Never     Smokeless tobacco: Never   Vaping Use     Vaping status: Not on file   Substance Use Topics     Alcohol use: Yes     Comment: occasionally             4/3/2023     9:04 AM   Alcohol Use   Prescreen: >3 drinks/day or >7 drinks/week? No     Reviewed orders with patient.   Reviewed health maintenance and updated orders accordingly - Yes  Lab work is in process    Breast Cancer Screening:    FHS-7:       4/3/2023     9:09 AM   Breast CA Risk Assessment (FHS-7)   Did any of your first-degree relatives have breast or ovarian cancer? No   Did any of your relatives have bilateral breast cancer? Unknown   Did any man in your family have breast cancer? No   Did any woman in your family have breast and ovarian cancer? Yes   Did any woman in your family have breast cancer before age 50 y? No   Do you have 2 or more relatives with breast and/or ovarian cancer? No   Do you have 2 or more relatives with breast and/or bowel cancer? No     click delete button to remove this line now  Mammogram Screening: Recommended annual mammography  Pertinent mammograms are reviewed under the imaging tab.    History of abnormal Pap smear: NO - age 30-65 PAP every 5 years with negative HPV co-testing recommended     Reviewed and updated as needed this visit by clinical staff   Tobacco  Allergies  Meds              Reviewed and updated as needed this visit by Provider                     Review of Systems   Constitutional: Negative for chills and fever.   HENT: Negative for congestion, ear pain, hearing loss and sore throat.    Eyes: Negative for pain and visual disturbance.   Respiratory: Negative for cough and shortness of breath.    Cardiovascular: Negative for chest pain, palpitations and peripheral edema.   Gastrointestinal: Negative for abdominal pain, constipation, diarrhea, heartburn, hematochezia and nausea.   Breasts:  Negative for tenderness, breast mass and discharge.   Genitourinary: Positive for vaginal bleeding. Negative for dysuria, frequency, genital sores, hematuria, pelvic pain, urgency and vaginal discharge.   Musculoskeletal: Negative for arthralgias, joint swelling and myalgias.   Skin: Negative for rash.   Neurological: Negative for dizziness, weakness, headaches and paresthesias.  "  Psychiatric/Behavioral: Negative for mood changes. The patient is not nervous/anxious.           OBJECTIVE:   BP 95/60 (BP Location: Right arm, Patient Position: Sitting, Cuff Size: Adult Regular)   Pulse 62   Temp 98.1  F (36.7  C) (Oral)   Resp 20   Ht 1.715 m (5' 7.5\")   Wt 63.5 kg (139 lb 14.4 oz)   LMP 03/03/2023 (Exact Date)   SpO2 98%   BMI 21.59 kg/m    Physical Exam  GENERAL: healthy, alert and no distress  EYES: Eyes grossly normal to inspection, PERRL and conjunctivae and sclerae normal  HENT: ear canals and TM's normal, nose and mouth without ulcers or lesions  NECK: no adenopathy, no asymmetry, masses, or scars and thyroid normal to palpation  RESP: lungs clear to auscultation - no rales, rhonchi or wheezes  CV: regular rate and rhythm, normal S1 S2, no S3 or S4, no murmur, click or rub, no peripheral edema and peripheral pulses strong  ABDOMEN: soft, nontender, no hepatosplenomegaly, no masses and bowel sounds normal  MS: no gross musculoskeletal defects noted, no edema  SKIN: no suspicious lesions or rashes  NEURO: Normal strength and tone, mentation intact and speech normal  PSYCH: mentation appears normal, affect normal/bright    Diagnostic Test Results:  Labs reviewed in Epic    ASSESSMENT/PLAN:       ICD-10-CM    1. Routine general medical examination at a health care facility  Z00.00     51 yo pt here to establish care and for routine physical.  Pap and colonsocopy UTD - will get records to clarify follow-up schedule for future colonoscopy.      2. General counseling for prescription of oral contraceptives  Z30.09     Discussed various options- provided pre-procedure counseling for IUD placement as she was leaning towards this option - she will call for appt once decided      3. Mild episode of recurrent major depressive disorder (H)  F33.0 sertraline (ZOLOFT) 50 MG tablet    Stable on current dose of sertraline - has not changed in decades.  Will take over management.      4. Acquired " hypothyroidism  E03.9 TSH WITH FREE T4 REFLEX     levothyroxine (SYNTHROID/LEVOTHROID) 137 MCG tablet     TSH WITH FREE T4 REFLEX    Taking over management of medications - check yearly TSH today      5. Partial seizures (H)  R56.9 levETIRAcetam (KEPPRA) 500 MG tablet     Comprehensive metabolic panel (BMP + Alb, Alk Phos, ALT, AST, Total. Bili, TP)     Comprehensive metabolic panel (BMP + Alb, Alk Phos, ALT, AST, Total. Bili, TP)    Was managed by neuro for many years - PCP took over medications - will take over and have her establish with neuro prn.      6. Tinnitus of both ears  H93.13     s/p hearing aids      7. Recurrent cold sores  B00.1 valACYclovir (VALTREX) 1000 mg tablet    Stable, refilled medications      8. Lipid screening  Z13.220 Lipid panel reflex to direct LDL Fasting     Lipid panel reflex to direct LDL Fasting      9. Visit for screening mammogram  Z12.31 MA SCREENING DIGITAL BILAT - Future  (s+30)      10. Screening for HIV (human immunodeficiency virus)  Z11.4 HIV Antigen Antibody Combo     HIV Antigen Antibody Combo      11. Need for hepatitis C screening test  Z11.59 Hepatitis C Screen Reflex to HCV RNA Quant and Genotype     Hepatitis C Screen Reflex to HCV RNA Quant and Genotype                COUNSELING:  Reviewed preventive health counseling, as reflected in patient instructions        She reports that she has never smoked. She has never used smokeless tobacco.      Jhonatan Wheatley MD  [facility]

## 2023-04-04 LAB
HCV AB SERPL QL IA: NONREACTIVE
HIV 1+2 AB+HIV1 P24 AG SERPL QL IA: NONREACTIVE

## 2023-04-04 NOTE — RESULT ENCOUNTER NOTE
"  Dear Jenni,    It was very nice to meet you.  I have reviewed your labs.    Your LDL cholesterol (aka \"bad\" cholesterol) is slightly elevated, but certainly not high enough to consider medications.    The remainder of your lab results, including your thyroid labs, are normal.  At this time, I do not recommend any changes to your current plan of care.    Please feel free to call with any questions.  Otherwise, we can discuss further at your next appointment.    Sincerely,    Jhonatan Wheatley MD  "

## 2023-04-15 ENCOUNTER — HEALTH MAINTENANCE LETTER (OUTPATIENT)
Age: 53
End: 2023-04-15

## 2023-05-10 ENCOUNTER — TRANSFERRED RECORDS (OUTPATIENT)
Dept: HEALTH INFORMATION MANAGEMENT | Facility: CLINIC | Age: 53
End: 2023-05-10
Payer: COMMERCIAL

## 2023-05-24 ENCOUNTER — ANCILLARY PROCEDURE (OUTPATIENT)
Dept: MAMMOGRAPHY | Facility: CLINIC | Age: 53
End: 2023-05-24
Attending: INTERNAL MEDICINE
Payer: COMMERCIAL

## 2023-05-24 DIAGNOSIS — Z12.31 VISIT FOR SCREENING MAMMOGRAM: ICD-10-CM

## 2023-05-24 PROCEDURE — 77067 SCR MAMMO BI INCL CAD: CPT | Mod: TC | Performed by: RADIOLOGY

## 2023-06-07 ENCOUNTER — OFFICE VISIT (OUTPATIENT)
Dept: PEDIATRICS | Facility: CLINIC | Age: 53
End: 2023-06-07
Payer: COMMERCIAL

## 2023-06-07 VITALS
BODY MASS INDEX: 22.27 KG/M2 | HEART RATE: 61 BPM | TEMPERATURE: 98.4 F | OXYGEN SATURATION: 100 % | HEIGHT: 67 IN | WEIGHT: 141.9 LBS | DIASTOLIC BLOOD PRESSURE: 60 MMHG | SYSTOLIC BLOOD PRESSURE: 90 MMHG | RESPIRATION RATE: 20 BRPM

## 2023-06-07 DIAGNOSIS — Z30.09 GENERAL COUNSELING FOR PRESCRIPTION OF ORAL CONTRACEPTIVES: Primary | ICD-10-CM

## 2023-06-07 PROCEDURE — 99213 OFFICE O/P EST LOW 20 MIN: CPT | Performed by: INTERNAL MEDICINE

## 2023-06-07 RX ORDER — LEVONORGESTREL/ETHIN.ESTRADIOL 0.1-0.02MG
1 TABLET ORAL DAILY
Qty: 84 TABLET | Refills: 4 | Status: SHIPPED | OUTPATIENT
Start: 2023-06-07 | End: 2023-08-16

## 2023-06-07 ASSESSMENT — PAIN SCALES - GENERAL: PAINLEVEL: NO PAIN (0)

## 2023-06-07 ASSESSMENT — PATIENT HEALTH QUESTIONNAIRE - PHQ9
SUM OF ALL RESPONSES TO PHQ QUESTIONS 1-9: 0
10. IF YOU CHECKED OFF ANY PROBLEMS, HOW DIFFICULT HAVE THESE PROBLEMS MADE IT FOR YOU TO DO YOUR WORK, TAKE CARE OF THINGS AT HOME, OR GET ALONG WITH OTHER PEOPLE: NOT DIFFICULT AT ALL
SUM OF ALL RESPONSES TO PHQ QUESTIONS 1-9: 0

## 2023-06-07 NOTE — PATIENT INSTRUCTIONS
The Pill: Start the contraceptive pill the first Sunday after your next period, even if you are still bleeding. Take it at about the same time every day. If you miss one day, take both the missed day and today's pill at the same time. If you miss more than one day, call or mychart me to figure out how to get back on track. Any time you miss a day, use condoms for the remainder of that cycle.   Remember birth control is not effective for the entire first week.  Take your blood pressure some time in the next 1-2 months and let me know if it's above 140/90. If you have any bothersome side effects, be sure to let me know.

## 2023-06-07 NOTE — PROGRESS NOTES
Assessment & Plan     General counseling for prescription of oral contraceptives  Discussed options for pregnancy prevention. Periods still consistently once a month and her usual flow and duration. After researching at home, she is requesting to start on OCP. Clarified some info on IUD. Discussed POP, vs estrogen containing OCP. Potential side effects, especially blood clots, liver effects. Potential benefits, period regulation through menopause.  Re-evaluate yearly.  - levonorgestrel-ethinyl estradiol (AVIANE) 0.1-20 MG-MCG tablet; Take 1 tablet by mouth daily      See Patient Instructions    Shanet Jarquin MD  [facility] KAMRAN Arteaga is a 52 year old, presenting for the following health issues:  Contraception        6/7/2023    11:17 AM   Additional Questions   Roomed by Daniel Cloud   Accompanied by ARNALDO         6/7/2023    11:17 AM   Patient Reported Additional Medications   Patient reports taking the following new medications None     Contraception    History of Present Illness       Reason for visit:  Birth control    She eats 4 or more servings of fruits and vegetables daily.She consumes 0 sweetened beverage(s) daily.She exercises with enough effort to increase her heart rate 30 to 60 minutes per day.  She exercises with enough effort to increase her heart rate 3 or less days per week.   She is taking medications regularly.    Today's PHQ-9         PHQ-9 Total Score: 0    PHQ-9 Q9 Thoughts of better off dead/self-harm past 2 weeks :   Not at all    How difficult have these problems made it for you to do your work, take care of things at home, or get along with other people: Not difficult at all       Review of Systems   Constitutional, HEENT, cardiovascular, pulmonary, gi and gu systems are negative, except as otherwise noted.      Objective    BP 90/60 (BP Location: Right arm, Patient Position: Sitting, Cuff Size: Adult Regular)   Pulse 61   Temp 98.4  F (36.9  C)  "(Tympanic)   Resp 20   Ht 1.696 m (5' 6.77\")   Wt 64.4 kg (141 lb 14.4 oz)   LMP 05/28/2023   SpO2 100%   BMI 22.38 kg/m    Body mass index is 22.38 kg/m .  Physical Exam   GENERAL: healthy, alert and no distress  NECK: no adenopathy, no asymmetry, masses, or scars and thyroid normal to palpation  RESP: lungs clear to auscultation - no rales, rhonchi or wheezes  CV: regular rate and rhythm, normal S1 S2, no S3 or S4, no murmur, click or rub, no peripheral edema and peripheral pulses strong  ABDOMEN: soft, nontender, no hepatosplenomegaly, no masses and bowel sounds normal  MS: no gross musculoskeletal defects noted, no edema        "

## 2023-08-08 ENCOUNTER — MYC MEDICAL ADVICE (OUTPATIENT)
Dept: PEDIATRICS | Facility: CLINIC | Age: 53
End: 2023-08-08
Payer: COMMERCIAL

## 2023-08-08 DIAGNOSIS — Z30.09 GENERAL COUNSELING FOR PRESCRIPTION OF ORAL CONTRACEPTIVES: ICD-10-CM

## 2023-08-10 RX ORDER — LEVONORGESTREL/ETHIN.ESTRADIOL 0.1-0.02MG
1 TABLET ORAL DAILY
Qty: 84 TABLET | Refills: 2 | OUTPATIENT
Start: 2023-08-10

## 2023-08-10 NOTE — TELEPHONE ENCOUNTER
Refills on file - levonorgestrel-ethinyl estradiol (AVIANE) 0.1-20 MG-MCG tablet 84 tablet 4 6/7/2023    Ngoc FOWLER RN

## 2023-08-24 ENCOUNTER — TELEPHONE (OUTPATIENT)
Dept: PEDIATRICS | Facility: CLINIC | Age: 53
End: 2023-08-24
Payer: COMMERCIAL

## 2023-08-24 DIAGNOSIS — R56.9 PARTIAL SEIZURES (H): Primary | ICD-10-CM

## 2023-08-24 RX ORDER — LEVETIRACETAM 500 MG/1
500 TABLET ORAL DAILY
Qty: 3 TABLET | Refills: 0 | Status: SHIPPED | OUTPATIENT
Start: 2023-08-24 | End: 2024-02-26

## 2023-08-24 NOTE — TELEPHONE ENCOUNTER
Pt is in [place] and forgot to pack her Keppra  And is requesting a 3 day supply    Dr. Zamudio,    Can you please sign this?  Med and pharmacy pended- please review    Thank you  Arthur Duffy RN on 8/24/2023 at 9:00 AM

## 2023-11-08 ENCOUNTER — OFFICE VISIT (OUTPATIENT)
Dept: PEDIATRICS | Facility: CLINIC | Age: 53
End: 2023-11-08
Payer: COMMERCIAL

## 2023-11-08 VITALS
HEART RATE: 63 BPM | WEIGHT: 147.1 LBS | SYSTOLIC BLOOD PRESSURE: 96 MMHG | HEIGHT: 67 IN | BODY MASS INDEX: 23.09 KG/M2 | DIASTOLIC BLOOD PRESSURE: 62 MMHG | TEMPERATURE: 98.3 F | OXYGEN SATURATION: 97 %

## 2023-11-08 DIAGNOSIS — R56.9 PARTIAL SEIZURES (H): ICD-10-CM

## 2023-11-08 DIAGNOSIS — G44.209 TENSION HEADACHE: Primary | ICD-10-CM

## 2023-11-08 DIAGNOSIS — E55.9 VITAMIN D DEFICIENCY: ICD-10-CM

## 2023-11-08 DIAGNOSIS — E03.9 ACQUIRED HYPOTHYROIDISM: ICD-10-CM

## 2023-11-08 LAB
ALBUMIN SERPL BCG-MCNC: 4.1 G/DL (ref 3.5–5.2)
ALP SERPL-CCNC: 39 U/L (ref 35–104)
ALT SERPL W P-5'-P-CCNC: 12 U/L (ref 0–50)
ANION GAP SERPL CALCULATED.3IONS-SCNC: 9 MMOL/L (ref 7–15)
AST SERPL W P-5'-P-CCNC: 19 U/L (ref 0–45)
BASOPHILS # BLD AUTO: 0 10E3/UL (ref 0–0.2)
BASOPHILS NFR BLD AUTO: 0 %
BILIRUB SERPL-MCNC: 0.2 MG/DL
BUN SERPL-MCNC: 15.5 MG/DL (ref 6–20)
CALCIUM SERPL-MCNC: 9.1 MG/DL (ref 8.6–10)
CHLORIDE SERPL-SCNC: 105 MMOL/L (ref 98–107)
CREAT SERPL-MCNC: 0.96 MG/DL (ref 0.51–0.95)
DEPRECATED HCO3 PLAS-SCNC: 25 MMOL/L (ref 22–29)
EGFRCR SERPLBLD CKD-EPI 2021: 71 ML/MIN/1.73M2
EOSINOPHIL # BLD AUTO: 0.1 10E3/UL (ref 0–0.7)
EOSINOPHIL NFR BLD AUTO: 1 %
ERYTHROCYTE [DISTWIDTH] IN BLOOD BY AUTOMATED COUNT: 14.6 % (ref 10–15)
GLUCOSE SERPL-MCNC: 84 MG/DL (ref 70–99)
HCT VFR BLD AUTO: 38.8 % (ref 35–47)
HGB BLD-MCNC: 12.3 G/DL (ref 11.7–15.7)
IMM GRANULOCYTES # BLD: 0 10E3/UL
IMM GRANULOCYTES NFR BLD: 0 %
LEVETIRACETAM SERPL-MCNC: 12 ΜG/ML (ref 10–40)
LYMPHOCYTES # BLD AUTO: 1.4 10E3/UL (ref 0.8–5.3)
LYMPHOCYTES NFR BLD AUTO: 18 %
MCH RBC QN AUTO: 28.9 PG (ref 26.5–33)
MCHC RBC AUTO-ENTMCNC: 31.7 G/DL (ref 31.5–36.5)
MCV RBC AUTO: 91 FL (ref 78–100)
MONOCYTES # BLD AUTO: 0.4 10E3/UL (ref 0–1.3)
MONOCYTES NFR BLD AUTO: 5 %
NEUTROPHILS # BLD AUTO: 5.7 10E3/UL (ref 1.6–8.3)
NEUTROPHILS NFR BLD AUTO: 75 %
PLATELET # BLD AUTO: 220 10E3/UL (ref 150–450)
POTASSIUM SERPL-SCNC: 4.7 MMOL/L (ref 3.4–5.3)
PROT SERPL-MCNC: 7.1 G/DL (ref 6.4–8.3)
RBC # BLD AUTO: 4.26 10E6/UL (ref 3.8–5.2)
SODIUM SERPL-SCNC: 139 MMOL/L (ref 135–145)
TSH SERPL DL<=0.005 MIU/L-ACNC: 3.18 UIU/ML (ref 0.3–4.2)
VIT D+METAB SERPL-MCNC: 34 NG/ML (ref 20–50)
WBC # BLD AUTO: 7.5 10E3/UL (ref 4–11)

## 2023-11-08 PROCEDURE — 36415 COLL VENOUS BLD VENIPUNCTURE: CPT | Performed by: PHYSICIAN ASSISTANT

## 2023-11-08 PROCEDURE — 99214 OFFICE O/P EST MOD 30 MIN: CPT | Performed by: PHYSICIAN ASSISTANT

## 2023-11-08 PROCEDURE — 80053 COMPREHEN METABOLIC PANEL: CPT | Performed by: PHYSICIAN ASSISTANT

## 2023-11-08 PROCEDURE — 80177 DRUG SCRN QUAN LEVETIRACETAM: CPT | Performed by: PHYSICIAN ASSISTANT

## 2023-11-08 PROCEDURE — 84443 ASSAY THYROID STIM HORMONE: CPT | Performed by: PHYSICIAN ASSISTANT

## 2023-11-08 PROCEDURE — 82306 VITAMIN D 25 HYDROXY: CPT | Performed by: PHYSICIAN ASSISTANT

## 2023-11-08 PROCEDURE — 85025 COMPLETE CBC W/AUTO DIFF WBC: CPT | Performed by: PHYSICIAN ASSISTANT

## 2023-11-08 ASSESSMENT — ENCOUNTER SYMPTOMS
NAUSEA: 1
HEADACHES: 1

## 2023-11-08 ASSESSMENT — PAIN SCALES - GENERAL: PAINLEVEL: MILD PAIN (2)

## 2023-11-08 ASSESSMENT — PATIENT HEALTH QUESTIONNAIRE - PHQ9: SUM OF ALL RESPONSES TO PHQ QUESTIONS 1-9: 0

## 2023-11-08 NOTE — PATIENT INSTRUCTIONS
We will draw labs today.      Please be sure make an appointment and followup with neurology.      Please seek immediate care if symptoms change or worsen in any way.

## 2023-11-08 NOTE — PROGRESS NOTES
Assessment & Plan     Tension headache    - CBC with platelets and differential; Future  - Comprehensive metabolic panel (BMP + Alb, Alk Phos, ALT, AST, Total. Bili, TP); Future  - Vitamin D Deficiency; Future  - Adult Neurology  Referral; Future    Partial seizures (H)    - Keppra (Levetiracetam) Level; Future  - Adult Neurology  Referral; Future    Acquired hypothyroidism    - TSH with free T4 reflex; Future    Vitamin D deficiency    - Vitamin D Deficiency; Future    Patient is here today to address a headache that has been ongoing for the past 2 months.  It is very mild, but constant.  She has no neurological symptoms with this.  Today on exam she is well appearing with normal vitals.  She as a normal exam including a normal neuro-exam.  She does have a history of seizures, but has not seen neurology in many years.  She was advised to followup with neurology.  We will also do labs today as well.    She has not really tried tylenol, she was advised to take tylenol for the headache to see if that helps the pain.  She was also encouraged to stay well hydrated.    Patient understands and agrees with the plan today.      Patient plan:  We will draw labs today.      Please be sure make an appointment and followup with neurology.      Please seek immediate care if symptoms change or worsen in any way.           [facility] KAMRAN Arteaga is a 53 year old, presenting for the following health issues:  Headache        11/8/2023     9:47 AM   Additional Questions   Roomed by [place] ROQUE   Accompanied by NA         11/8/2023     9:47 AM   Patient Reported Additional Medications   Patient reports taking the following new medications None       Headache   The problem occurs constantly. The problem has not changed since onset.The quality of the pain is described as dull. Associated symptoms include nausea. She has tried acetaminophen (Only used once) for  "the symptoms. The treatment provided mild relief.   History of Present Illness       Headaches:   Since the patient's last clinic visit, headaches are: no change  The patient is getting headaches:  The pain is constant  She is able to do normal daily activities when she has a migraine.  The patient is taking the following rescue/relief medications:  No rescue/relief medications   Patient states \"I get no relief\" from the rescue/relief medications.   The patient is taking the following medications to prevent migraines:  No medications to prevent migraines  In the past 4 weeks, the patient has gone to an Urgent Care or Emergency Room 0 times times due to headaches.    She eats 4 or more servings of fruits and vegetables daily.She consumes 0 sweetened beverage(s) daily.She exercises with enough effort to increase her heart rate 10 to 19 minutes per day.  She exercises with enough effort to increase her heart rate 3 or less days per week.   She is taking medications regularly.       Patient is here for 2/10 mild, but constant headache pain for the past 2 months.  She denies any neurological symptoms with the pain.  She is not having any visual changes, nausea, vomiting.  She also denies any light or noise sensitivity.  She also reports that she does not get any worsened pain with activity or intercourse.  Or note, she does have a history of seizures, but has been well controlled and has not seen a neurologist in many years she reports.        Review of Systems   Gastrointestinal:  Positive for nausea.   Neurological:  Positive for headaches.      Constitutional, HEENT, cardiovascular, pulmonary, gi and gu systems are negative, except as otherwise noted.      Objective    BP 96/62 (BP Location: Right arm, Patient Position: Sitting, Cuff Size: Adult Regular)   Pulse 63   Temp 98.3  F (36.8  C) (Oral)   Ht 1.702 m (5' 7\")   Wt 66.7 kg (147 lb 1.6 oz)   LMP 10/13/2023 (Exact Date)   SpO2 97%   BMI 23.04 kg/m    Body " "mass index is 23.04 kg/m .  Physical Exam   GENERAL: healthy, alert and no distress  EYES: Eyes grossly normal to inspection, PERRL and conjunctivae and sclerae normal  HENT: ear canals and TM's normal, nose and mouth without ulcers or lesions  NECK: no adenopathy, no asymmetry, masses, or scars and thyroid normal to palpation  RESP: lungs clear to auscultation - no rales, rhonchi or wheezes  CV: regular rate and rhythm, normal S1 S2, no S3 or S4, no murmur, click or rub, no peripheral edema and peripheral pulses strong  MS: no gross musculoskeletal defects noted, no edema  NEURO: Normal strength and tone, mentation intact and speech normal  NEURO: Normal strength and tone, sensory exam grossly normal, mentation intact, speech normal, cranial nerves 2-12 intact, and Can spell \"WORLD\" backwards.    PSYCH: mentation appears normal, affect normal/bright    Labs - pending    Anni Daily PA-C               "

## 2023-11-09 NOTE — RESULT ENCOUNTER NOTE
Erin Arteaga ,    The results from your recent lab work are within normal limits.    -All of your labs are normal.      Thank you for choosing [facility] for your health care needs,      Anni Daily PA-C

## 2023-11-20 ENCOUNTER — TELEPHONE (OUTPATIENT)
Dept: NEUROLOGY | Facility: CLINIC | Age: 53
End: 2023-11-20
Payer: COMMERCIAL

## 2023-11-21 ENCOUNTER — OFFICE VISIT (OUTPATIENT)
Dept: NEUROLOGY | Facility: CLINIC | Age: 53
End: 2023-11-21
Attending: PHYSICIAN ASSISTANT
Payer: COMMERCIAL

## 2023-11-21 VITALS
DIASTOLIC BLOOD PRESSURE: 73 MMHG | SYSTOLIC BLOOD PRESSURE: 108 MMHG | HEIGHT: 67 IN | HEART RATE: 58 BPM | OXYGEN SATURATION: 98 % | BODY MASS INDEX: 23.09 KG/M2 | WEIGHT: 147.1 LBS

## 2023-11-21 DIAGNOSIS — G44.52 NEW DAILY PERSISTENT HEADACHE: Primary | ICD-10-CM

## 2023-11-21 DIAGNOSIS — R53.83 FATIGUE, UNSPECIFIED TYPE: ICD-10-CM

## 2023-11-21 DIAGNOSIS — R56.9 PARTIAL SEIZURES (H): ICD-10-CM

## 2023-11-21 DIAGNOSIS — R51.9 NOCTURNAL HEADACHES: ICD-10-CM

## 2023-11-21 DIAGNOSIS — G44.209 TENSION HEADACHE: ICD-10-CM

## 2023-11-21 PROCEDURE — 99204 OFFICE O/P NEW MOD 45 MIN: CPT

## 2023-11-21 ASSESSMENT — HEADACHE IMPACT TEST (HIT 6)
WHEN YOU HAVE HEADACHES HOW OFTEN IS THE PAIN SEVERE: RARELY
HOW OFTEN HAVE YOU FELT TOO TIRED TO WORK BECAUSE OF YOUR HEADACHES: SOMETIMES
HOW OFTEN DID HEADACHS LIMIT CONCENTRATION ON WORK OR DAILY ACTIVITY: RARELY
HOW OFTEN HAVE YOU FELT FED UP OR IRRITATED BECAUSE OF YOUR HEADACHES: VERY OFTEN
WHEN YOU HAVE A HEADACHE HOW OFTEN DO YOU WISH YOU COULD LIE DOWN: VERY OFTEN
HIT6 TOTAL SCORE: 58
HOW OFTEN DO HEADACHES LIMIT YOUR DAILY ACTIVITIES: SOMETIMES

## 2023-11-21 ASSESSMENT — PAIN SCALES - GENERAL: PAINLEVEL: MILD PAIN (3)

## 2023-11-21 NOTE — PROGRESS NOTES
[facility]   Headache Neurology Consult    November 21, 2023     Jenni Torres MRN# [id number]   YOB: 1970 Age: 53 year old     Referring provider: Anni Daily          Assessment and Recommendations:     Jenni Torres is a 53 year old female who presents for further evaluation of headache.    She has had new daily headache for the past 2 months.  These headaches are tension type, though she has had one instance of a more severe headache which woke her from sleep.  She also has a history of seizures, and is well-maintained on levetiracetam 500 mg daily.     Her neurologic examination is overall intact today.  However, given new onset daily headache over age 50 and with headache waking from sleep, I did recommend further evaluation with an MRI brain with and without contrast.  Additionally, she reports that she has had a prior positive KAROLINA, So we will also check ESR, CRP, rheumatoid factor, and B12 levels.  She had other recent blood work completed by her primary care provider, CBC, CMP, and vitamin D level within normal limits.  She did note that her thyroid level, while within normal limits, was much different than her previous levels.  I recommend she discuss this with her primary care provider.    We discussed the following treatment strategy:  - For acute treatment of mild headache, she may use acetaminophen or naproxen as needed.  Use should not exceed more than 14 days/month to avoid medication overuse.    Her current frequency and severity of headaches warrant prevention.  - We will see what MRI and lab work shows.  - In the meantime, she may try magnesium and/or riboflavin supplementation.  Recommended doses provided via AVS today.  Potential side effects reviewed.  -Should she require additional headache prophylaxis, her resting heart rate and baseline blood pressure are on the lower end so would be hesitant to try an antihypertensive agent.  She is  currently using sertraline and levetiracetam. Topiramate contraindicated due to history of kidney stones. Could consider gabapentin or CGRP inhibitors.    I will let her know of results as they become available, and she will follow-up in clinic in 3 months.    I spent 48 minutes today on patient care, chart review, and documentation.    Mary Gomez PA-C  Headache Neurology  [facility]            Chief Complaint:     Chief Complaint   Patient presents with    Headache     Tension Headache           History is obtained from the patient and medical record.      Jenni Torres is a 53 year old female who presents for further evaluation of headaches.   She also has a history of partial seizures.    She reports a history of occasional headaches but notes that headaches have become constant the past 2 months.     Headaches are a constant pressure. Location of headache can vary. Today it has been at the front of her head, but then moved to the back of her head. Pain tends to be central or midline.   She currently reports her average pain is a 2-3/10, at most a 4/10.  She currently reports daily headache since mid-September.     She denies nausea, photophobia, phonophobia, osmophobia.  She denies visual disturbance, focal paresthesias or weakness, dizziness or lightheadedness, unilateral autonomic features. Denies clear positional component, though she notices that if she's holding her breath while opening a jar this may worsen headache. Denies precipitating illness or injury.    She has had one instance where headache woke her from sleep on 11/6/2023. This is the one time where headache was more severe, up to an 8/10. She took acetaminophen, laid back down, and was able to fall back asleep at some point.     Acetaminophen is somewhat helpful. It brings severity of pain down but does not completely alleviate headache. She has only used acetaminophen occasionally for this headache.    She has  general fatigue. She denies arthralgia, myalgia, rash, change in appetite, unintentional weight change.    She denies sleep problems. She does possibly clench her teeth at night.     She drinks decaf coffee and one soda in the afternoon.    She denies family history of headaches.     She is up to date on eye and dental exams, no concerns.     She reports that historically she would tend to get headache around menses but has not noticed any change with daily headache related to menstruation.    She had two spells concerning for seizure in 2006. These were both nocturnal events - she only recalls nocturia.  Her  witnessed the second event but she does not recall what the seizures are like. She was put on Keppra 500 mg once daily and has not had any recurrence of seizures since.               Past Medical History:     Past Medical History:   Diagnosis Date    Hypothyroidism     Kidney stone 11/2018    Seizures (H)               Past Surgical History:   No past surgical history on file.          Social History:     Social History     Socioeconomic History    Marital status:      Spouse name: Not on file    Number of children: Not on file    Years of education: Not on file    Highest education level: Not on file   Occupational History    Not on file   Tobacco Use    Smoking status: Never    Smokeless tobacco: Never   Substance and Sexual Activity    Alcohol use: Yes     Comment: occasionally    Drug use: No    Sexual activity: Not on file   Other Topics Concern    Not on file   Social History Narrative    Not on file     Social Determinants of Health     Financial Resource Strain: Low Risk  (11/8/2023)    Financial Resource Strain     Within the past 12 months, have you or your family members you live with been unable to get utilities (heat, electricity) when it was really needed?: No   Food Insecurity: Low Risk  (11/8/2023)    Food Insecurity     Within the past 12 months, did you worry that your food  would run out before you got money to buy more?: No     Within the past 12 months, did the food you bought just not last and you didn t have money to get more?: No   Transportation Needs: Low Risk  (11/8/2023)    Transportation Needs     Within the past 12 months, has lack of transportation kept you from medical appointments, getting your medicines, non-medical meetings or appointments, work, or from getting things that you need?: No   Physical Activity: Sufficiently Active (4/3/2023)    Exercise Vital Sign     Days of Exercise per Week: 3 days     Minutes of Exercise per Session: 50 min   Stress: No Stress Concern Present (4/3/2023)    [employer] - Occupational Stress Questionnaire     Feeling of Stress : Not at all   Social Connections: Moderately Integrated (4/3/2023)    Social Connection and Isolation Panel [NHANES]     Frequency of Communication with Friends and Family: More than three times a week     Frequency of Social Gatherings with Friends and Family: Twice a week     Attends Gnosticism Services: More than 4 times per year     Active Member of Clubs or Organizations: Yes     Attends Club or Organization Meetings: Not on file     Marital Status:    Interpersonal Safety: Low Risk  (11/8/2023)    Interpersonal Safety     Do you feel physically and emotionally safe where you currently live?: Yes     Within the past 12 months, have you been hit, slapped, kicked or otherwise physically hurt by someone?: No     Within the past 12 months, have you been humiliated or emotionally abused in other ways by your partner or ex-partner?: No   Housing Stability: Low Risk  (11/8/2023)    Housing Stability     Do you have housing? : Yes     Are you worried about losing your housing?: No             Family History:   No family history on file.          Allergies:    No Known Allergies          Medications:     Current Outpatient Medications:     BINAXNOW COVID-19 AG HOME TEST KIT, TEST AS  "DIRECTED TODAY, Disp: , Rfl:     Cholecalciferol (VITAMIN D3) 50 MCG (2000 UT) CAPS, TAKE 1 TABLET BY MOUTH THREE TIMES DAILY FOR 2 MONTHS, Disp: , Rfl:     levETIRAcetam (KEPPRA) 500 MG tablet, Take 1 tablet (500 mg) by mouth daily, Disp: 3 tablet, Rfl: 0    levETIRAcetam (KEPPRA) 500 MG tablet, Take 1 tablet (500 mg) by mouth daily, Disp: 90 tablet, Rfl: 3    levonorgestrel-ethinyl estradiol (AVIANE) 0.1-20 MG-MCG tablet, Take 1 tablet by mouth daily, Disp: 84 tablet, Rfl: 2    levothyroxine (SYNTHROID/LEVOTHROID) 137 MCG tablet, Take 1 tablet (137 mcg) by mouth daily, Disp: 90 tablet, Rfl: 3    Multiple Vitamin (ONE-A-DAY ESSENTIAL) TABS, Take 1 tablet by mouth daily, Disp: , Rfl:     sertraline (ZOLOFT) 50 MG tablet, Take 1 tablet (50 mg) by mouth daily, Disp: 90 tablet, Rfl: 3    valACYclovir (VALTREX) 1000 mg tablet, Take 2 tablets (2,000 mg) by mouth 2 times daily for 1 day, Disp: 4 tablet, Rfl: 3          Physical Exam:   /73   Pulse 58   Ht 1.702 m (5' 7\")   Wt 66.7 kg (147 lb 1.6 oz)   LMP 10/13/2023 (Exact Date)   SpO2 98%   BMI 23.04 kg/m       General: In no acute distress.  Head: Normocephalic, atraumatic. No radiating pain with palpation over the supraorbital notches, occipital nerves. Temporal pulses intact.   Neck: Normal range of motion with lateral head movements and neck flexion.  Eyes: No conjunctival injection, no scleral icterus.     Neurologic Exam:  Mental Status Exam: Alert, awake and oriented to situation. No dysarthria. Speech of normal fluency.  Cranial Nerves: Fundoscopic exam with clear disc margins bilaterally. PERRLA, EOMs intact, no nystagmus, facial movements symmetric, facial sensation intact to light touch, hearing intact to conversation, trapezius and SCMs 5/5 bilaterally, tongue midline and fully mobile. No tongue atrophy or fasciculations.   Motor: Normal tone in all four extremities, no atrophy or fasciculations. 5/5 strength bilaterally in shoulder abduction, " elbow flexion and extension, wrist flexion and extension, hip flexion, knee flexion and extension, dorsiflexion and plantarflexion. No tremors or abnormal movements noted.  Sensory: Sensation intact to light touch on arms and legs bilaterally.   Coordination: Finger-nose-finger intact bilaterally. Rapidly alternating movements intact bilaterally in the upper extremities. Normal finger tapping bilaterally. Normal Romberg.  Reflexes: 2+ and symmetric in triceps, biceps, brachioradialis, patellar, and Achilles. Plantar reflexes are downgoing bilaterally.  Gait: Normal gait. Able to toe and heel walk. Normal tandem gait.

## 2023-11-21 NOTE — LETTER
11/21/2023         RE: Jenni Torres  [address]  [place] [address]        Dear Colleague,    Thank you for referring your patient, Jenni Torres, to the [facility]. Please see a copy of my visit note below.    [facility]   Headache Neurology Consult    November 21, 2023     Jenni Torres MRN# [id number]   YOB: 1970 Age: 53 year old     Referring provider: Anni Daily          Assessment and Recommendations:     Jenni Torres is a 53 year old female who presents for further evaluation of headache.    She has had new daily headache for the past 2 months.  These headaches are tension type, though she has had one instance of a more severe headache which woke her from sleep.  She also has a history of seizures, and is well-maintained on levetiracetam 500 mg daily.     Her neurologic examination is overall intact today.  However, given new onset daily headache over age 50 and with headache waking from sleep, I did recommend further evaluation with an MRI brain with and without contrast.  Additionally, she reports that she has had a prior positive KAROLINA, So we will also check ESR, CRP, rheumatoid factor, and B12 levels.  She had other recent blood work completed by her primary care provider, CBC, CMP, and vitamin D level within normal limits.  She did note that her thyroid level, while within normal limits, was much different than her previous levels.  I recommend she discuss this with her primary care provider.    We discussed the following treatment strategy:  - For acute treatment of mild headache, she may use acetaminophen or naproxen as needed.  Use should not exceed more than 14 days/month to avoid medication overuse.    Her current frequency and severity of headaches warrant prevention.  - We will see what MRI and lab work shows.  - In the meantime, she may try magnesium and/or riboflavin supplementation.   Recommended doses provided via AVS today.  Potential side effects reviewed.  -Should she require additional headache prophylaxis, her resting heart rate and baseline blood pressure are on the lower end so would be hesitant to try an antihypertensive agent.  She is currently using sertraline and levetiracetam. Topiramate contraindicated due to history of kidney stones. Could consider gabapentin or CGRP inhibitors.    I will let her know of results as they become available, and she will follow-up in clinic in 3 months.    I spent 48 minutes today on patient care, chart review, and documentation.    Mary Gomez PA-C  Headache Neurology  [facility]            Chief Complaint:     Chief Complaint   Patient presents with     Headache     Tension Headache           History is obtained from the patient and medical record.      Jenni Torres is a 53 year old female who presents for further evaluation of headaches.   She also has a history of partial seizures.    She reports a history of occasional headaches but notes that headaches have become constant the past 2 months.     Headaches are a constant pressure. Location of headache can vary. Today it has been at the front of her head, but then moved to the back of her head. Pain tends to be central or midline.   She currently reports her average pain is a 2-3/10, at most a 4/10.  She currently reports daily headache since mid-September.     She denies nausea, photophobia, phonophobia, osmophobia.  She denies visual disturbance, focal paresthesias or weakness, dizziness or lightheadedness, unilateral autonomic features. Denies clear positional component, though she notices that if she's holding her breath while opening a jar this may worsen headache. Denies precipitating illness or injury.    She has had one instance where headache woke her from sleep on 11/6/2023. This is the one time where headache was more severe, up to an 8/10. She took  acetaminophen, laid back down, and was able to fall back asleep at some point.     Acetaminophen is somewhat helpful. It brings severity of pain down but does not completely alleviate headache. She has only used acetaminophen occasionally for this headache.    She has general fatigue. She denies arthralgia, myalgia, rash, change in appetite, unintentional weight change.    She denies sleep problems. She does possibly clench her teeth at night.     She drinks decaf coffee and one soda in the afternoon.    She denies family history of headaches.     She is up to date on eye and dental exams, no concerns.     She reports that historically she would tend to get headache around menses but has not noticed any change with daily headache related to menstruation.    She had two spells concerning for seizure in 2006. These were both nocturnal events - she only recalls nocturia.  Her  witnessed the second event but she does not recall what the seizures are like. She was put on Keppra 500 mg once daily and has not had any recurrence of seizures since.               Past Medical History:     Past Medical History:   Diagnosis Date     Hypothyroidism      Kidney stone 11/2018     Seizures (H)               Past Surgical History:   No past surgical history on file.          Social History:     Social History     Socioeconomic History     Marital status:      Spouse name: Not on file     Number of children: Not on file     Years of education: Not on file     Highest education level: Not on file   Occupational History     Not on file   Tobacco Use     Smoking status: Never     Smokeless tobacco: Never   Substance and Sexual Activity     Alcohol use: Yes     Comment: occasionally     Drug use: No     Sexual activity: Not on file   Other Topics Concern     Not on file   Social History Narrative     Not on file     Social Determinants of Health     Financial Resource Strain: Low Risk  (11/8/2023)    Financial Resource  Strain      Within the past 12 months, have you or your family members you live with been unable to get utilities (heat, electricity) when it was really needed?: No   Food Insecurity: Low Risk  (11/8/2023)    Food Insecurity      Within the past 12 months, did you worry that your food would run out before you got money to buy more?: No      Within the past 12 months, did the food you bought just not last and you didn t have money to get more?: No   Transportation Needs: Low Risk  (11/8/2023)    Transportation Needs      Within the past 12 months, has lack of transportation kept you from medical appointments, getting your medicines, non-medical meetings or appointments, work, or from getting things that you need?: No   Physical Activity: Sufficiently Active (4/3/2023)    Exercise Vital Sign      Days of Exercise per Week: 3 days      Minutes of Exercise per Session: 50 min   Stress: No Stress Concern Present (4/3/2023)    [employer] - Occupational Stress Questionnaire      Feeling of Stress : Not at all   Social Connections: Moderately Integrated (4/3/2023)    Social Connection and Isolation Panel [NHANES]      Frequency of Communication with Friends and Family: More than three times a week      Frequency of Social Gatherings with Friends and Family: Twice a week      Attends Catholic Services: More than 4 times per year      Active Member of Clubs or Organizations: Yes      Attends Club or Organization Meetings: Not on file      Marital Status:    Interpersonal Safety: Low Risk  (11/8/2023)    Interpersonal Safety      Do you feel physically and emotionally safe where you currently live?: Yes      Within the past 12 months, have you been hit, slapped, kicked or otherwise physically hurt by someone?: No      Within the past 12 months, have you been humiliated or emotionally abused in other ways by your partner or ex-partner?: No   Housing Stability: Low Risk  (11/8/2023)     "Housing Stability      Do you have housing? : Yes      Are you worried about losing your housing?: No             Family History:   No family history on file.          Allergies:    No Known Allergies          Medications:     Current Outpatient Medications:      BINAXNOW COVID-19 AG HOME TEST KIT, TEST AS DIRECTED TODAY, Disp: , Rfl:      Cholecalciferol (VITAMIN D3) 50 MCG (2000 UT) CAPS, TAKE 1 TABLET BY MOUTH THREE TIMES DAILY FOR 2 MONTHS, Disp: , Rfl:      levETIRAcetam (KEPPRA) 500 MG tablet, Take 1 tablet (500 mg) by mouth daily, Disp: 3 tablet, Rfl: 0     levETIRAcetam (KEPPRA) 500 MG tablet, Take 1 tablet (500 mg) by mouth daily, Disp: 90 tablet, Rfl: 3     levonorgestrel-ethinyl estradiol (AVIANE) 0.1-20 MG-MCG tablet, Take 1 tablet by mouth daily, Disp: 84 tablet, Rfl: 2     levothyroxine (SYNTHROID/LEVOTHROID) 137 MCG tablet, Take 1 tablet (137 mcg) by mouth daily, Disp: 90 tablet, Rfl: 3     Multiple Vitamin (ONE-A-DAY ESSENTIAL) TABS, Take 1 tablet by mouth daily, Disp: , Rfl:      sertraline (ZOLOFT) 50 MG tablet, Take 1 tablet (50 mg) by mouth daily, Disp: 90 tablet, Rfl: 3     valACYclovir (VALTREX) 1000 mg tablet, Take 2 tablets (2,000 mg) by mouth 2 times daily for 1 day, Disp: 4 tablet, Rfl: 3          Physical Exam:   /73   Pulse 58   Ht 1.702 m (5' 7\")   Wt 66.7 kg (147 lb 1.6 oz)   LMP 10/13/2023 (Exact Date)   SpO2 98%   BMI 23.04 kg/m       General: In no acute distress.  Head: Normocephalic, atraumatic. No radiating pain with palpation over the supraorbital notches, occipital nerves. Temporal pulses intact.   Neck: Normal range of motion with lateral head movements and neck flexion.  Eyes: No conjunctival injection, no scleral icterus.     Neurologic Exam:  Mental Status Exam: Alert, awake and oriented to situation. No dysarthria. Speech of normal fluency.  Cranial Nerves: Fundoscopic exam with clear disc margins bilaterally. PERRLA, EOMs intact, no nystagmus, facial movements " symmetric, facial sensation intact to light touch, hearing intact to conversation, trapezius and SCMs 5/5 bilaterally, tongue midline and fully mobile. No tongue atrophy or fasciculations.   Motor: Normal tone in all four extremities, no atrophy or fasciculations. 5/5 strength bilaterally in shoulder abduction, elbow flexion and extension, wrist flexion and extension, hip flexion, knee flexion and extension, dorsiflexion and plantarflexion. No tremors or abnormal movements noted.  Sensory: Sensation intact to light touch on arms and legs bilaterally.   Coordination: Finger-nose-finger intact bilaterally. Rapidly alternating movements intact bilaterally in the upper extremities. Normal finger tapping bilaterally. Normal Romberg.  Reflexes: 2+ and symmetric in triceps, biceps, brachioradialis, patellar, and Achilles. Plantar reflexes are downgoing bilaterally.  Gait: Normal gait. Able to toe and heel walk. Normal tandem gait.             Again, thank you for allowing me to participate in the care of your patient.        Sincerely,        NOEL FREEDMAN PA-C

## 2023-11-21 NOTE — PATIENT INSTRUCTIONS
Magnesium glycinate or oxide - 400-800 mg daily  Riboflavin (B2) - 400 mg daily     Complete blood work and schedule MRI brain     Acetaminophen, ibuprofen, or naproxen 440 mg - can use up to 14 days per month

## 2023-11-21 NOTE — NURSING NOTE
"Jenni Torres is a 53 year old female who presents for:  Chief Complaint   Patient presents with    Headache     Tension Headache        Initial Vitals:  /73   Pulse 58   Ht 1.702 m (5' 7\")   Wt 66.7 kg (147 lb 1.6 oz)   LMP 10/13/2023 (Exact Date)   SpO2 98%   BMI 23.04 kg/m   Estimated body mass index is 23.04 kg/m  as calculated from the following:    Height as of this encounter: 1.702 m (5' 7\").    Weight as of this encounter: 66.7 kg (147 lb 1.6 oz).. Body surface area is 1.78 meters squared. BP completed using cuff size: brandee Fregoso   "

## 2023-11-26 ENCOUNTER — LAB (OUTPATIENT)
Dept: LAB | Facility: CLINIC | Age: 53
End: 2023-11-26
Payer: COMMERCIAL

## 2023-11-26 DIAGNOSIS — R53.83 FATIGUE, UNSPECIFIED TYPE: ICD-10-CM

## 2023-11-26 DIAGNOSIS — G44.52 NEW DAILY PERSISTENT HEADACHE: ICD-10-CM

## 2023-11-26 LAB — ERYTHROCYTE [SEDIMENTATION RATE] IN BLOOD BY WESTERGREN METHOD: 8 MM/HR (ref 0–30)

## 2023-11-26 PROCEDURE — 86038 ANTINUCLEAR ANTIBODIES: CPT

## 2023-11-26 PROCEDURE — 36415 COLL VENOUS BLD VENIPUNCTURE: CPT

## 2023-11-26 PROCEDURE — 86140 C-REACTIVE PROTEIN: CPT

## 2023-11-26 PROCEDURE — 86431 RHEUMATOID FACTOR QUANT: CPT

## 2023-11-26 PROCEDURE — 86200 CCP ANTIBODY: CPT

## 2023-11-26 PROCEDURE — 85652 RBC SED RATE AUTOMATED: CPT

## 2023-11-26 PROCEDURE — 82607 VITAMIN B-12: CPT

## 2023-11-26 PROCEDURE — 86039 ANTINUCLEAR ANTIBODIES (ANA): CPT

## 2023-11-27 LAB
CRP SERPL-MCNC: <3 MG/L
RHEUMATOID FACT SERPL-ACNC: 13 IU/ML
VIT B12 SERPL-MCNC: 429 PG/ML (ref 232–1245)

## 2023-11-28 LAB
ANA PAT SER IF-IMP: ABNORMAL
ANA SER QL IF: ABNORMAL
ANA TITR SER IF: ABNORMAL {TITER}
CCP AB SER IA-ACNC: 1.4 U/ML

## 2023-11-30 ENCOUNTER — MYC MEDICAL ADVICE (OUTPATIENT)
Dept: NEUROLOGY | Facility: CLINIC | Age: 53
End: 2023-11-30
Payer: COMMERCIAL

## 2023-11-30 NOTE — TELEPHONE ENCOUNTER
Per Mary's result oumou:  Morro Arteaga,  Your lab work looks good, the only one that came back abnormal was the KAROLINA, which you've had a positive KAROLINA in the past. If you'd like, I can place a referral to rheumatology (they tend to book out very far) or you can also discuss things with your primary provider.  Mary Gomez PA-C

## 2023-12-13 ENCOUNTER — HOSPITAL ENCOUNTER (OUTPATIENT)
Dept: MRI IMAGING | Facility: HOSPITAL | Age: 53
Discharge: HOME | End: 2023-12-13
Payer: COMMERCIAL

## 2023-12-13 DIAGNOSIS — G44.52 NEW DAILY PERSISTENT HEADACHE: ICD-10-CM

## 2023-12-13 DIAGNOSIS — R51.9 NOCTURNAL HEADACHES: ICD-10-CM

## 2023-12-13 PROCEDURE — A9585 GADOBUTROL INJECTION: HCPCS | Mod: JZ

## 2023-12-13 PROCEDURE — 255N000002 HC RX 255 OP 636: Mod: JZ

## 2023-12-13 PROCEDURE — 70553 MRI BRAIN STEM W/O & W/DYE: CPT

## 2023-12-13 RX ORDER — GADOBUTROL 604.72 MG/ML
0.1 INJECTION INTRAVENOUS ONCE
Status: COMPLETED | OUTPATIENT
Start: 2023-12-13 | End: 2023-12-13

## 2023-12-13 RX ADMIN — GADOBUTROL 6 ML: 604.72 INJECTION INTRAVENOUS at 09:14

## 2024-01-25 ENCOUNTER — ANCILLARY PROCEDURE (OUTPATIENT)
Dept: GENERAL RADIOLOGY | Facility: CLINIC | Age: 54
End: 2024-01-25
Attending: STUDENT IN AN ORGANIZED HEALTH CARE EDUCATION/TRAINING PROGRAM
Payer: COMMERCIAL

## 2024-01-25 ENCOUNTER — OFFICE VISIT (OUTPATIENT)
Dept: ORTHOPEDICS | Facility: CLINIC | Age: 54
End: 2024-01-25
Payer: COMMERCIAL

## 2024-01-25 VITALS
DIASTOLIC BLOOD PRESSURE: 70 MMHG | WEIGHT: 140 LBS | HEIGHT: 67 IN | BODY MASS INDEX: 21.97 KG/M2 | SYSTOLIC BLOOD PRESSURE: 110 MMHG

## 2024-01-25 DIAGNOSIS — M25.511 CHRONIC RIGHT SHOULDER PAIN: ICD-10-CM

## 2024-01-25 DIAGNOSIS — G89.29 CHRONIC RIGHT SHOULDER PAIN: ICD-10-CM

## 2024-01-25 DIAGNOSIS — M75.41 IMPINGEMENT SYNDROME OF RIGHT SHOULDER: ICD-10-CM

## 2024-01-25 DIAGNOSIS — M75.81 ROTATOR CUFF TENDONITIS, RIGHT: Primary | ICD-10-CM

## 2024-01-25 PROCEDURE — 99203 OFFICE O/P NEW LOW 30 MIN: CPT | Performed by: STUDENT IN AN ORGANIZED HEALTH CARE EDUCATION/TRAINING PROGRAM

## 2024-01-25 PROCEDURE — 73030 X-RAY EXAM OF SHOULDER: CPT | Mod: TC | Performed by: RADIOLOGY

## 2024-01-25 NOTE — PROGRESS NOTES
ASSESSMENT & PLAN    Jenni was seen today for pain.    Diagnoses and all orders for this visit:    Rotator cuff tendonitis, right  -     Physical Therapy Referral; Future    Chronic right shoulder pain  -     XR Shoulder Right G/E 3 Views; Future    Impingement syndrome of right shoulder      53-year-old female presents with chronic right shoulder pain for the past several years, worse over the past 6 weeks.  She reports doing physical therapy in the past and admits to some noncompliance with home exercise program, however this helped in the past.  On physical exam, she primarily has pain with external rotation as well as Neer impingement sign consistent with likely some rotator cuff tendinitis in the setting of impingement syndrome.  X-ray today reveals no significant degenerative changes of her shoulder.    Plan:  -Try to avoid sleeping on right side  -Tylenol Extra Strength (1000mg) up to three times daily as needed for pain  -Start PT and home exercise program. Home exercise program provided in clinic today- start pendulum swings right away   -Check with insurance company on price of subacromial bursa corticosteroid injection, if you would like to proceed, send a [employer] message or call the office and we will get you in   -Follow-up for injection or after PT if injection is cost prohibitive   -If no improvement with therapy, consider MRI in the future   -Aleve twice daily for 7 days to help with inflammation and pain. Take with food      Return if symptoms worsen or fail to improve.      Dr. Shobha Waldron, DO, CAQ  [facility]  Sports Medicine     -----  Chief Complaint   Patient presents with    Right Shoulder - Pain       SUBJECTIVE  Jenni Torres is a/an 53 year old left-handed female who is seen as a self referral for evaluation of right shoulder pain.  Onset was 6 years with no injury. Pain is located right lateral shoulder. Symptoms are worsened by lifting arm away from  "body.  She has tried physical therapy, and home exercises. Associated symptoms include limited range of motion.    The patient is seen alone    Prior injury/Surgical history of affected joint: none  Social History/Occupation: [employer]    REVIEW OF SYSTEMS:  Pertinent positives/negative: As stated above in HPI    OBJECTIVE:  /70   Ht 1.702 m (5' 7\")   Wt 63.5 kg (140 lb)   BMI 21.93 kg/m     General: Alert and in no distress  Skin: no visable rashes  CV: Extremities appear well perfused   Resp: normal respiratory effort, no conversational dyspnea   Psych: normal mood, affect  MSK:  Right Shoulder Exam:  Appearance: Symmetric shoulder heights, No scapular winging observed  Palpation: Tender to palpation of proximal biceps  Rotator cuff strength:    Supraspinatus: 5/5   ER: 4/5   IR: 5/5  AROM:    Forward flexion: 180   Abduction: 180   ER at side: 90   IR: To belt line  Special tests: + for Neer, Shackelford's, and resisted external rotation       RADIOLOGY:  Final results and radiologist's interpretation available in the [facility] health record.  Images below were personally reviewed and discussed with the patient in the office today.  My personal interpretation of the performed imaging: X-Ray of the R shoulder performed on 1/25/24 reveals no fractures, dislocation, degenerative changes, or other osseous abnormalities.                   "

## 2024-01-25 NOTE — PATIENT INSTRUCTIONS
Plan:  -Try to avoid sleeping on right side  -Tylenol Extra Strength (1000mg) up to three times daily as needed for pain  -Start PT and home exercise program. Home exercise program provided in clinic- start pendulum swings right away   -Check with insurance company on price of subacromial bursa corticosteroid injection, if you would like to proceed, send a [employer] message or call the office and we will get you in   -Follow-up for injection or after PT if injection is cost prohibitive   -If no improvement with therapy, consider MRI in the future   -Aleve twice daily for 7 days to help with inflammation and pain. Take with food    Thank you for choosing [facility]!    DR. SEGOVIA'S CLINIC LOCATIONS:     [place]  TRIAGE LINE: [phone] [address] APPOINTMENTS: [phone]   [place] [address] RADIOLOGY: [phone]   (Mondays & Tuesdays) HAND THERAPY: [phone]    PHYSICAL THERAPY: [phone]   [place] BILLING QUESTIONS: [phone] [address] FAX: [phone]   [place] [address]    (Thursdays & Fridays)

## 2024-01-25 NOTE — Clinical Note
2024         RE: Jenni Torres  [address]  [place] [address]        Dear Colleague,    Thank you for referring your patient, Jenni Torres, to the [facility] [place]. Please see a copy of my visit note below.    ASSESSMENT & PLAN    Diagnoses and all orders for this visit:    Chronic right shoulder pain      53-year-old female presents with chronic right shoulder pain      No follow-ups on file.      Dr. Shobha Waldron, DO, CAQ  [facility]  Sports Medicine     -----  No chief complaint on file.      SUBJECTIVE  Jenni Torres is a/an 53 year old left-handed female who is seen as a self referral for evaluation of right shoulder pain.  Onset was 6 years with no injury. Pain is located right lateral shoulder. Symptoms are worsened by lifting arm away from body.  She has tried physical therapy, and home exercises. Associated symptoms include limited range of motion.    The patient is seen alone    Prior injury/Surgical history of affected joint: none  Social History/Occupation: [employer]    REVIEW OF SYSTEMS:  Pertinent positives/negative: As stated above in HPI    OBJECTIVE:  There were no vitals taken for this visit.   General: Alert and in no distress  Skin: no visable rashes  CV: Extremities appear well perfused   Resp: normal respiratory effort, no conversational dyspnea   Psych: normal mood, affect  MSK:  Right Shoulder Exam:  Appearance: Symmetric shoulder heights, *** scapular winging observed  Palpation: Tender to palpation of ***  Rotator cuff strength:    Supraspinatus: {STRENGTH:884457}   ER: {STRENGTH:959751}   IR: {STRENGTH:029600}  AROM:    Forward flexion: {NUMBERS; 0-180 BY 10:830377}   Abduction: {NUMBERS; 0-180 BY 10:512073}   ER at side: 90   IR: To belt line  Special tests: + for {RTC testin}       RADIOLOGY:  Final results and radiologist's interpretation available in the [facility]  "record.  Images below were personally reviewed and discussed with the patient in the office today.  My personal interpretation of the performed imaging: X-Ray of the R shoulder performed on 1/25/24 reveals no fractures, dislocation, degenerative changes, or other osseous abnormalities.        {[facility] 2021 Documentation (Optional):978564}  {2021 E&M time (Optional):098404}  {Provider  Link to [facility] Help Grid :670802}         ASSESSMENT & PLAN    Jenni was seen today for pain.    Diagnoses and all orders for this visit:    Rotator cuff tendonitis, right  -     Physical Therapy Referral; Future    Chronic right shoulder pain  -     XR Shoulder Right G/E 3 Views; Future    Impingement syndrome of right shoulder      53-year-old female presents with chronic right shoulder pain      No follow-ups on file.      Dr. Shobha Waldron DO, [facility]  [facility]     -----  Chief Complaint   Patient presents with     Right Shoulder - Pain       SUBJECTIVE  Jenni Torres is a/an 53 year old left-handed female who is seen as a self referral for evaluation of right shoulder pain.  Onset was 6 years with no injury. Pain is located right lateral shoulder. Symptoms are worsened by lifting arm away from body.  She has tried physical therapy, and home exercises. Associated symptoms include limited range of motion.    The patient is seen alone    Prior injury/Surgical history of affected joint: none  Social History/Occupation: [employer]    REVIEW OF SYSTEMS:  Pertinent positives/negative: As stated above in HPI    OBJECTIVE:  /70   Ht 1.702 m (5' 7\")   Wt 63.5 kg (140 lb)   BMI 21.93 kg/m     General: Alert and in no distress  Skin: no visable rashes  CV: Extremities appear well perfused   Resp: normal respiratory effort, no conversational dyspnea   Psych: normal mood, affect  MSK:  Right Shoulder Exam:  Appearance: Symmetric shoulder heights, *** scapular winging " observed  Palpation: Tender to palpation of ***  Rotator cuff strength:    Supraspinatus: {STRENGTH:093430}   ER: {STRENGTH:193843}   IR: {STRENGTH:541424}  AROM:    Forward flexion: {NUMBERS; 0-180 BY 10:172171}   Abduction: {NUMBERS; 0-180 BY 10:529398}   ER at side: 90   IR: To belt line  Special tests: + for {RTC testin}       RADIOLOGY:  Final results and radiologist's interpretation available in the [facility] health record.  Images below were personally reviewed and discussed with the patient in the office today.  My personal interpretation of the performed imaging: X-Ray of the R shoulder performed on 24 reveals no fractures, dislocation, degenerative changes, or other osseous abnormalities.        {[facility]  Documentation (Optional):630876}  { E&M time (Optional):218038}  {Provider  Link to [facility] Help Grid :058803}           Again, thank you for allowing me to participate in the care of your patient.        Sincerely,        Shobha Waldron, DO

## 2024-01-26 ASSESSMENT — ACTIVITIES OF DAILY LIVING (ADL)
PUTTING_ON_AN_UNDERSHIRT_OR_A_PULLOVER_SWEATER: 1
CARRYING_A_HEAVY_OBJECT_OF_10_POUNDS: 0
TOUCHING_THE_BACK_OF_YOUR_NECK: 0
PUTTING_ON_A_SHIRT_THAT_BUTTONS_DOWN_THE_FRONT: 0
AT_ITS_WORST?: 5
WASHING_YOUR_HAIR?: 1
PLACING_AN_OBJECT_ON_A_HIGH_SHELF: 4
PLEASE_INDICATE_YOR_PRIMARY_REASON_FOR_REFERRAL_TO_THERAPY:: SHOULDER
PUTTING_ON_YOUR_PANTS: 0
REMOVING_SOMETHING_FROM_YOUR_BACK_POCKET: 0
REACHING_FOR_SOMETHING_ON_A_HIGH_SHELF: 5
PUSHING_WITH_THE_INVOLVED_ARM: 4
WHEN_LYING_ON_THE_INVOLVED_SIDE: 2

## 2024-01-26 NOTE — PROGRESS NOTES
PHYSICAL THERAPY EVALUATION  Type of Visit: Evaluation    See electronic medical record for Abuse and Falls Screening details.    Subjective       Presenting condition or subjective complaint: Shoulder pain occurs frequently throughout the day  Date of onset: 12/18/23    Relevant medical history: Hearing problems; Migraines or headaches; Seizures; Thyroid problems   Dates & types of surgery: None    Prior diagnostic imaging/testing results: X-ray     Prior therapy history for the same diagnosis, illness or injury: Yes 2008, 2010, 2015 PT on right shoulder    Prior Level of Function  Transfers:   Ambulation:   ADL:   IADL:     Living Environment  Social support: Alone   Type of home: Apartment/condo   Stairs to enter the home: No       Ramp: No   Stairs inside the home: No       Help at home: None  Equipment owned:       Employment: Yes   Hobbies/Interests: Volleyball, reading, workouts, skating    Patient goals for therapy: Sleep comfortably, workout with weights, lift above my shoulders    Workout routine: forward and lateral raises, planks, any weightbearing, does a workout class    Pain assessment: See objective evaluation for additional pain details     Objective   SHOULDER EVALUATION  PAIN: Pain Level at Rest: 1/10  Pain Level with Use: 6/10  Pain Location: throughout R shoulder depending on activity  Pain Quality: Sharp and discomfort  Pain is Exacerbated By: lifting, reaching, lying on R side, waking up several times throughout the night with pain, working out, driving, pushing and pulling  Pain is Relieved By: has not tried anything  Pain Progression: Unchanged  INTEGUMENTARY (edema, incisions):   POSTURE: Sitting Posture: Rounded shoulders, Forward head, mild R>L overall  GAIT:   Weightbearing Status:   Assistive Device(s):   Gait Deviations:   BALANCE/PROPRIOCEPTION:   WEIGHTBEARING ALIGNMENT:   ROM:   (Degrees) Left AROM Left PROM Right AROM  Right PROM   Shoulder Flexion WNL  WNL with  painful arc    Shoulder Extension       Shoulder Abduction WNL  WNL with painful arc    Shoulder Adduction       Shoulder Internal Rotation WNL  WNL    Shoulder External Rotation WNL  WNL    Shoulder Horizontal Abduction       Shoulder Horizontal Adduction       Shoulder Flexion ER       Shoulder Flexion IR       Elbow Extension       Elbow Flexion       Pain:   End feel:     STRENGTH:   Pain: - none + mild ++ moderate +++ severe  Strength Scale: 0-5/5 Left Right   Shoulder Flexion 5 5, + (mild)   Shoulder Extension     Shoulder Abduction 5 5   Shoulder Adduction     Shoulder Internal Rotation 5 5   Shoulder External Rotation 5 4, ++ (mod)   Shoulder Horizontal Abduction     Shoulder Horizontal Adduction     Elbow Flexion 5 5   Elbow Extension 5 5   Mid Trap     Lower Trap     Rhomboid     Serratus Anterior       FLEXIBILITY:   SPECIAL TESTS:    Left Right   Impingement     Neer's  Positive   Hawkin's-Herson  Negative    Painful arc  Positive   Internal impingement     Labral     Anterior Slide     Ripley's     Crank     Instability     Apprehension (anterior)     Relocation (anterior)     Anterior Load & Shift     Posterior Load & Shift     Posterior instability (with 90 degrees flex)     Multi-Directional Instability      Sulcus     Biceps      Speed's  Negative    Rotator Cuff Tear     Drop Arm  Negative    Belly Press  Negative    Lift off   Negative      PALPATION:   + Tenderness At Location Left Right   Clavicle     Sternoclavicular     Acromioclavicular     Biceps  +   Triceps     Supraspinatus  -   Infraspinatus  +   Teres minor  +   Subscapularis  -   Deltoid     Levator     Rhomboids  -   Upper trap  -   Incisional     Bicipital groove       JOINT MOBILITY:   CERVICAL SCREEN: WFL    Assessment & Plan   CLINICAL IMPRESSIONS  Medical Diagnosis: Rotator cuff tendonitis, right    Treatment Diagnosis: Rotator cuff tendonitis, right, acute right shoulder pain   Impression/Assessment: Patient is a 53 year old  female with right shoulder pain complaints.  The following significant findings have been identified: Pain, Decreased strength, Impaired muscle performance, Decreased activity tolerance, and Impaired posture. These impairments interfere with their ability to perform work tasks, recreational activities, and household chores as compared to previous level of function.     Clinical Decision Making (Complexity):  Clinical Presentation: Stable/Uncomplicated  Clinical Presentation Rationale: based on medical and personal factors listed in PT evaluation  Clinical Decision Making (Complexity): Low complexity    PLAN OF CARE  Treatment Interventions:  Modalities: Cryotherapy, E-stim, Hot Pack, Ultrasound  Interventions: Manual Therapy, Neuromuscular Re-education, Therapeutic Activity, Therapeutic Exercise, Self-Care/Home Management    Long Term Goals     PT Goal 1  Goal Identifier: HEP  Goal Description: Pt will be independent in HEP to address impairments, reduce pain and improve function  Target Date: 03/29/24  PT Goal 2  Goal Identifier: Workouts  Goal Description: Pt will be able to complete overhead lifting workouts with <3/10 pain for return to recreational activities  Target Date: 04/27/24  PT Goal 3  Goal Identifier: Sleep  Goal Description: Pt will be able to sleep on her right side for improvement in sleep quality without waking from pain  Target Date: 04/27/24      Frequency of Treatment: 1x/week  Duration of Treatment: 12 weeks    Recommended Referrals to Other Professionals:   Education Assessment:   Learner/Method: Patient;Pictures/Video    Risks and benefits of evaluation/treatment have been explained.   Patient/Family/caregiver agrees with Plan of Care.     Evaluation Time:     PT Eval, Low Complexity Minutes (99324): 18       Signing Clinician: Bindu Reyes, PT

## 2024-01-29 ENCOUNTER — THERAPY VISIT (OUTPATIENT)
Dept: PHYSICAL THERAPY | Facility: CLINIC | Age: 54
End: 2024-01-29
Attending: STUDENT IN AN ORGANIZED HEALTH CARE EDUCATION/TRAINING PROGRAM
Payer: COMMERCIAL

## 2024-01-29 DIAGNOSIS — M75.81 ROTATOR CUFF TENDONITIS, RIGHT: ICD-10-CM

## 2024-01-29 PROCEDURE — 97110 THERAPEUTIC EXERCISES: CPT | Mod: GP | Performed by: PHYSICAL THERAPIST

## 2024-01-29 PROCEDURE — 97161 PT EVAL LOW COMPLEX 20 MIN: CPT | Mod: GP | Performed by: PHYSICAL THERAPIST

## 2024-02-18 DIAGNOSIS — Z30.09 GENERAL COUNSELING FOR PRESCRIPTION OF ORAL CONTRACEPTIVES: ICD-10-CM

## 2024-02-20 RX ORDER — LEVONORGESTREL/ETHIN.ESTRADIOL 0.1-0.02MG
1 TABLET ORAL DAILY
Qty: 84 TABLET | Refills: 0 | Status: SHIPPED | OUTPATIENT
Start: 2024-02-20 | End: 2024-04-28

## 2024-02-22 ENCOUNTER — THERAPY VISIT (OUTPATIENT)
Dept: PHYSICAL THERAPY | Facility: CLINIC | Age: 54
End: 2024-02-22
Payer: COMMERCIAL

## 2024-02-22 DIAGNOSIS — M75.81 ROTATOR CUFF TENDONITIS, RIGHT: Primary | ICD-10-CM

## 2024-02-22 PROCEDURE — 97110 THERAPEUTIC EXERCISES: CPT | Mod: GP | Performed by: PHYSICAL THERAPIST

## 2024-02-22 PROCEDURE — 97140 MANUAL THERAPY 1/> REGIONS: CPT | Mod: GP | Performed by: PHYSICAL THERAPIST

## 2024-02-26 DIAGNOSIS — F33.0 MILD EPISODE OF RECURRENT MAJOR DEPRESSIVE DISORDER (H): ICD-10-CM

## 2024-02-26 DIAGNOSIS — R56.9 PARTIAL SEIZURES (H): ICD-10-CM

## 2024-02-26 DIAGNOSIS — E03.9 ACQUIRED HYPOTHYROIDISM: ICD-10-CM

## 2024-02-27 RX ORDER — LEVOTHYROXINE SODIUM 137 UG/1
137 TABLET ORAL DAILY
Qty: 90 TABLET | Refills: 1 | Status: SHIPPED | OUTPATIENT
Start: 2024-02-27 | End: 2024-05-09

## 2024-02-27 RX ORDER — LEVETIRACETAM 500 MG/1
500 TABLET ORAL DAILY
Qty: 3 TABLET | Refills: 0 | Status: SHIPPED | OUTPATIENT
Start: 2024-02-27 | End: 2024-06-19

## 2024-03-06 ENCOUNTER — PATIENT OUTREACH (OUTPATIENT)
Dept: CARE COORDINATION | Facility: CLINIC | Age: 54
End: 2024-03-06
Payer: COMMERCIAL

## 2024-03-28 ENCOUNTER — THERAPY VISIT (OUTPATIENT)
Dept: PHYSICAL THERAPY | Facility: CLINIC | Age: 54
End: 2024-03-28
Payer: COMMERCIAL

## 2024-03-28 DIAGNOSIS — M75.81 ROTATOR CUFF TENDONITIS, RIGHT: Primary | ICD-10-CM

## 2024-03-28 PROCEDURE — 97110 THERAPEUTIC EXERCISES: CPT | Mod: GP | Performed by: PHYSICAL THERAPIST

## 2024-03-28 PROCEDURE — 97140 MANUAL THERAPY 1/> REGIONS: CPT | Mod: GP | Performed by: PHYSICAL THERAPIST

## 2024-04-11 ENCOUNTER — THERAPY VISIT (OUTPATIENT)
Dept: PHYSICAL THERAPY | Facility: CLINIC | Age: 54
End: 2024-04-11
Payer: COMMERCIAL

## 2024-04-11 DIAGNOSIS — M75.81 ROTATOR CUFF TENDONITIS, RIGHT: Primary | ICD-10-CM

## 2024-04-11 PROCEDURE — 97110 THERAPEUTIC EXERCISES: CPT | Mod: GP | Performed by: PHYSICAL THERAPIST

## 2024-04-25 ENCOUNTER — THERAPY VISIT (OUTPATIENT)
Dept: PHYSICAL THERAPY | Facility: CLINIC | Age: 54
End: 2024-04-25
Payer: COMMERCIAL

## 2024-04-25 DIAGNOSIS — M75.81 ROTATOR CUFF TENDONITIS, RIGHT: Primary | ICD-10-CM

## 2024-04-25 PROCEDURE — 97140 MANUAL THERAPY 1/> REGIONS: CPT | Mod: GP | Performed by: PHYSICAL THERAPIST

## 2024-04-25 PROCEDURE — 97110 THERAPEUTIC EXERCISES: CPT | Mod: GP | Performed by: PHYSICAL THERAPIST

## 2024-04-25 NOTE — PROGRESS NOTES
PLAN  Date extension due to slow progress with PT. Pt making progress towards goals and pain is improving.    Beginning/End Dates of Progress Note Reporting Period:  01/29/24 to 04/25/2024    Referring Provider:  Shobha Waldron     04/25/24 0500   Appointment Info   Signing clinician's name / credentials Bindu Reyes, PT, DPT, CLT-DEMI   Total/Authorized Visits E&T   Visits Used 5   Medical Diagnosis Rotator cuff tendonitis, right   PT Tx Diagnosis Rotator cuff tendonitis, right, acute right shoulder pain   Progress Note/Certification   Onset of illness/injury or Date of Surgery 12/18/23   Therapy Frequency 1-2x/month   Predicted Duration 2 months   Progress Note Due Date 04/27/24   Progress Note Completed Date 01/29/24   GOALS   PT Goals 2;3;4   PT Goal 1   Goal Identifier HEP   Goal Description Pt will be independent in HEP to address impairments, reduce pain and improve function   Goal Progress doing every other day- continued to progress today   Target Date 03/29/24   Date Met 04/11/24   PT Goal 2   Goal Identifier Workouts   Goal Description Pt will be able to complete overhead lifting workouts with <3/10 pain for return to recreational activities   Goal Progress instructed to re-start   Target Date 06/20/24   PT Goal 3   Goal Identifier Sleep   Goal Description Pt will be able to sleep on her right side for improvement in sleep quality without waking from pain   Goal Progress waking up less due to the shoulder now   Target Date 06/20/24   Subjective Report   Subjective Report Pt reports waking less throughout the night and not noticing the pain as much throughout the day. Overall, feels it is improving. Pain: 1/10   Objective Measures   Objective Measures Objective Measure 1;Objective Measure 2;Objective Measure 3   Objective Measure 1   Objective Measure Shoulder AROM   Details flexion: WNLAbd: WNL with pain at  deg, just dull ache after manual therapy   Objective Measure 3   Objective  Measure Palpation   Details palpation: tender at short head of biceps   Treatment Interventions (PT)   Interventions Therapeutic Procedure/Exercise;Manual Therapy   Therapeutic Procedure/Exercise   Therapeutic Procedures: strength, endurance, ROM, flexibility minutes (54348) 10   Therapeutic Procedures Ther Proc 2   Ther Proc 1 UBE   Ther Proc 1 - Details x 5 min, forwards, WL3 for UE strengthening   Ther Proc 2 Objective measures taken   Ther Proc 2 - Details Education on re-starting home workouts and avoiding painful positions, try twice prior to next session with modifications if needed   PTRx Ther Proc 1 Ball wall   PTRx Ther Proc 1 - Details x 90 sec on R   PTRx Ther Proc 2 Elbow Strengthening Eccentric Flexion   PTRx Ther Proc 2 - Details HEP   PTRx Ther Proc 3 Shoulder External Rotation Sidelying   PTRx Ther Proc 3 - Details HEP   PTRx Ther Proc 4 Prone Plank Modified Knees   PTRx Ther Proc 4 - Details HEP   PTRx Ther Proc 5 Prone Scapula I   PTRx Ther Proc 5 - Details HEP   PTRx Ther Proc 6 Shoulder Scaption Full Can   PTRx Ther Proc 6 - Details HEP   PTRx Ther Proc 7 Scapula Serratus Press   PTRx Ther Proc 7 - Details switched to regular plank   PTRx Ther Proc 8 Pec Stretch Doorway   PTRx Ther Proc 8 - Details Switched to the corner stretch instead   PTRx Ther Proc 9 Push-Up Plus At Counter   PTRx Ther Proc 9 - Details HEP- as time allows   Skilled Intervention Progression of HEP for R rotator cuff, biceps and scapular strengthening to improve function and return to PLOF   Patient Response/Progress able to increase weights by 1-2# at home   Manual Therapy   Manual Therapy: Mobilization, MFR, MLD, friction massage minutes (50514) 24   Manual Therapy Manual Therapy 2   Manual Therapy 1 CFM   Manual Therapy 1 - Details supine CFM to R biceps short head tendon with instruction and practice for how to complete at home   Manual Therapy 2 GH joint mobilizations   Manual Therapy 2 - Details supine R GH joint  distraction, inferior mobilizations, grade II-IV   Skilled Intervention CFM and joint mobilizations to improve tissue mobility reduce pain and improve function   Patient Response/Progress slight soreness after   Education   Learner/Method Patient;Pictures/Video   Education Comments education on options for returning to orthopedists if she would like to pursue injection due to time of pain or can continue with new PT exercises and give it 6-8 weeks to allow for strengthening   Plan   Home program PTRx   Updates to plan of care date extension due to slow progress   Plan for next session continue with strengthening progression- ball wall circles   Total Session Time   Timed Code Treatment Minutes 34   Total Treatment Time (sum of timed and untimed services) 34

## 2024-04-28 DIAGNOSIS — Z30.09 GENERAL COUNSELING FOR PRESCRIPTION OF ORAL CONTRACEPTIVES: ICD-10-CM

## 2024-04-29 RX ORDER — LEVONORGESTREL/ETHIN.ESTRADIOL 0.1-0.02MG
1 TABLET ORAL DAILY
Qty: 84 TABLET | Refills: 0 | Status: SHIPPED | OUTPATIENT
Start: 2024-04-29 | End: 2024-05-09

## 2024-05-04 SDOH — HEALTH STABILITY: PHYSICAL HEALTH: ON AVERAGE, HOW MANY MINUTES DO YOU ENGAGE IN EXERCISE AT THIS LEVEL?: 40 MIN

## 2024-05-04 SDOH — HEALTH STABILITY: PHYSICAL HEALTH: ON AVERAGE, HOW MANY DAYS PER WEEK DO YOU ENGAGE IN MODERATE TO STRENUOUS EXERCISE (LIKE A BRISK WALK)?: 4 DAYS

## 2024-05-04 ASSESSMENT — SOCIAL DETERMINANTS OF HEALTH (SDOH): HOW OFTEN DO YOU GET TOGETHER WITH FRIENDS OR RELATIVES?: ONCE A WEEK

## 2024-05-09 ENCOUNTER — THERAPY VISIT (OUTPATIENT)
Dept: PHYSICAL THERAPY | Facility: CLINIC | Age: 54
End: 2024-05-09
Payer: COMMERCIAL

## 2024-05-09 ENCOUNTER — OFFICE VISIT (OUTPATIENT)
Dept: PEDIATRICS | Facility: CLINIC | Age: 54
End: 2024-05-09
Attending: INTERNAL MEDICINE
Payer: COMMERCIAL

## 2024-05-09 VITALS
SYSTOLIC BLOOD PRESSURE: 110 MMHG | TEMPERATURE: 98.4 F | HEIGHT: 67 IN | RESPIRATION RATE: 18 BRPM | HEART RATE: 61 BPM | BODY MASS INDEX: 22.47 KG/M2 | OXYGEN SATURATION: 100 % | WEIGHT: 143.2 LBS | DIASTOLIC BLOOD PRESSURE: 66 MMHG

## 2024-05-09 DIAGNOSIS — R56.9 PARTIAL SEIZURES (H): ICD-10-CM

## 2024-05-09 DIAGNOSIS — B00.1 RECURRENT COLD SORES: ICD-10-CM

## 2024-05-09 DIAGNOSIS — F33.0 MILD EPISODE OF RECURRENT MAJOR DEPRESSIVE DISORDER (H): ICD-10-CM

## 2024-05-09 DIAGNOSIS — M75.81 ROTATOR CUFF TENDONITIS, RIGHT: Primary | ICD-10-CM

## 2024-05-09 DIAGNOSIS — Z30.09 GENERAL COUNSELING FOR PRESCRIPTION OF ORAL CONTRACEPTIVES: ICD-10-CM

## 2024-05-09 DIAGNOSIS — N63.11 MASS OF UPPER OUTER QUADRANT OF RIGHT BREAST: ICD-10-CM

## 2024-05-09 DIAGNOSIS — E03.9 ACQUIRED HYPOTHYROIDISM: ICD-10-CM

## 2024-05-09 DIAGNOSIS — Z00.00 ROUTINE GENERAL MEDICAL EXAMINATION AT A HEALTH CARE FACILITY: Primary | ICD-10-CM

## 2024-05-09 LAB
CHOLEST SERPL-MCNC: 194 MG/DL
FASTING STATUS PATIENT QL REPORTED: YES
HDLC SERPL-MCNC: 65 MG/DL
LDLC SERPL CALC-MCNC: 120 MG/DL
NONHDLC SERPL-MCNC: 129 MG/DL
TRIGL SERPL-MCNC: 44 MG/DL
TSH SERPL DL<=0.005 MIU/L-ACNC: 2.07 UIU/ML (ref 0.3–4.2)

## 2024-05-09 PROCEDURE — 90471 IMMUNIZATION ADMIN: CPT

## 2024-05-09 PROCEDURE — 84443 ASSAY THYROID STIM HORMONE: CPT

## 2024-05-09 PROCEDURE — 97140 MANUAL THERAPY 1/> REGIONS: CPT | Mod: GP | Performed by: PHYSICAL THERAPIST

## 2024-05-09 PROCEDURE — 99396 PREV VISIT EST AGE 40-64: CPT | Mod: GC

## 2024-05-09 PROCEDURE — 90715 TDAP VACCINE 7 YRS/> IM: CPT

## 2024-05-09 PROCEDURE — 36415 COLL VENOUS BLD VENIPUNCTURE: CPT

## 2024-05-09 PROCEDURE — 97110 THERAPEUTIC EXERCISES: CPT | Mod: GP | Performed by: PHYSICAL THERAPIST

## 2024-05-09 PROCEDURE — 99213 OFFICE O/P EST LOW 20 MIN: CPT | Mod: 25

## 2024-05-09 PROCEDURE — 80061 LIPID PANEL: CPT

## 2024-05-09 RX ORDER — LEVONORGESTREL/ETHIN.ESTRADIOL 0.1-0.02MG
1 TABLET ORAL DAILY
Qty: 84 TABLET | Refills: 0 | Status: SHIPPED | OUTPATIENT
Start: 2024-05-09 | End: 2024-06-19

## 2024-05-09 RX ORDER — LEVETIRACETAM 500 MG/1
500 TABLET ORAL DAILY
Qty: 90 TABLET | Refills: 3 | Status: SHIPPED | OUTPATIENT
Start: 2024-05-09

## 2024-05-09 RX ORDER — LEVOTHYROXINE SODIUM 137 UG/1
137 TABLET ORAL DAILY
Qty: 90 TABLET | Refills: 1 | Status: SHIPPED | OUTPATIENT
Start: 2024-05-09

## 2024-05-09 RX ORDER — VALACYCLOVIR HYDROCHLORIDE 1 G/1
2000 TABLET, FILM COATED ORAL 2 TIMES DAILY
Qty: 4 TABLET | Refills: 3 | Status: SHIPPED | OUTPATIENT
Start: 2024-05-09

## 2024-05-09 ASSESSMENT — PAIN SCALES - GENERAL: PAINLEVEL: NO PAIN (0)

## 2024-05-09 ASSESSMENT — PATIENT HEALTH QUESTIONNAIRE - PHQ9
SUM OF ALL RESPONSES TO PHQ QUESTIONS 1-9: 0
SUM OF ALL RESPONSES TO PHQ QUESTIONS 1-9: 0
10. IF YOU CHECKED OFF ANY PROBLEMS, HOW DIFFICULT HAVE THESE PROBLEMS MADE IT FOR YOU TO DO YOUR WORK, TAKE CARE OF THINGS AT HOME, OR GET ALONG WITH OTHER PEOPLE: NOT DIFFICULT AT ALL

## 2024-05-09 NOTE — PATIENT INSTRUCTIONS
"Please follow up with your mammogram at the end of the month. If you are noticing it is enlarging rapidly please come back to clinic sooner or if you notice pain or drainage from that site. Please reach out with questions or concerns.      Preventive Care Advice   This is general advice we often give to help people stay healthy. Your care team may have specific advice just for you. Please talk to your care team about your own preventive care needs.  Lifestyle  Exercise at least 150 minutes each week (30 minutes a day, 5 days a week).  Do muscle strengthening activities 2 days a week. These help control your weight and prevent disease.  No smoking.  Wear sunscreen to prevent skin cancer.  Have your home tested for radon every 2 to 5 years. Radon is a colorless, odorless gas that can harm your lungs. To learn more, go to www.health.state.[place] and search for \"Radon in Homes.\"  Keep guns unloaded and locked up in a safe place like a safe or gun vault, or, use a gun lock and hide the keys. Always lock away bullets separately. To learn more, visit [employer].[place] and search for \"safe gun storage.\"  Nutrition  Eat 5 or more servings of fruits and vegetables each day.  Try wheat bread, brown rice and whole grain pasta (instead of white bread, rice, and pasta).  Get enough calcium and vitamin D. Check the label on foods and aim for 100% of the RDA (recommended daily allowance).  Regular exams  Have a dental exam and cleaning every 6 months.  See your health care team every year to talk about:  Any changes in your health.  Any medicines your care team has prescribed.  Preventive care, family planning, and ways to prevent chronic diseases.  Shots (vaccines)   HPV shots (up to age 26), if you've never had them before.  Hepatitis B shots (up to age 59), if you've never had them before.  COVID-19 shot: Get this shot when it's due.  Flu shot: Get a flu shot every year.  Tetanus shot: Get a tetanus shot every 10 years.  Pneumococcal, " hepatitis A, and RSV shots: Ask your care team if you need these based on your risk.  Shingles shot (for age 50 and up).  General health tests  Diabetes screening:  Starting at age 35, Get screened for diabetes at least every 3 years.  If you are younger than age 35, ask your care team if you should be screened for diabetes.  Cholesterol test: At age 39, start having a cholesterol test every 5 years, or more often if advised.  Bone density scan (DEXA): At age 50, ask your care team if you should have this scan for osteoporosis (brittle bones).  Hepatitis C: Get tested at least once in your life.  Abdominal aortic aneurysm screening: Talk to your doctor about having this screening if you:  Have ever smoked; and  Are biologically male; and  Are between the ages of 65 and 75.  STIs (sexually transmitted infections)  Before age 24: Ask your care team if you should be screened for STIs.  After age 24: Get screened for STIs if you're at risk. You are at risk for STIs (including HIV) if:  You are sexually active with more than one person.  You don't use condoms every time.  You or a partner was diagnosed with a sexually transmitted infection.  If you are at risk for HIV, ask about PrEP medicine to prevent HIV.  Get tested for HIV at least once in your life, whether you are at risk for HIV or not.  Cancer screening tests  Cervical cancer screening: If you have a cervix, begin getting regular cervical cancer screening tests at age 21. Most people who have regular screenings with normal results can stop after age 65. Talk about this with your provider.  Breast cancer scan (mammogram): If you've ever had breasts, begin having regular mammograms starting at age 40. This is a scan to check for breast cancer.  Colon cancer screening: It is important to start screening for colon cancer at age 45.  Have a colonoscopy test every 10 years (or more often if you're at risk) Or, ask your provider about stool tests like a FIT test every  year or Cologuard test every 3 years.  To learn more about your testing options, visit: www.[employer]/243045.pdf.  For help making a decision, visit: aileen/gu64130.  Prostate cancer screening test: If you have a prostate and are age 55 to 69, ask your provider if you would benefit from a yearly prostate cancer screening test.  Lung cancer screening: If you are a current or former smoker age 50 to 80, ask your care team if ongoing lung cancer screenings are right for you.  For informational purposes only. Not to replace the advice of your health care provider. Copyright   2023 [employer]. All rights reserved. Clinically reviewed by the [facility] Transitions Program. [place] 252452 - REV 04/24.    Learning About Stress  What is stress?     Stress is your body's response to a hard situation. Your body can have a physical, emotional, or mental response. Stress is a fact of life for most people, and it affects everyone differently. What causes stress for you may not be stressful for someone else.  A lot of things can cause stress. You may feel stress when you go on a job interview, take a test, or run a race. This kind of short-term stress is normal and even useful. It can help you if you need to work hard or react quickly. For example, stress can help you finish an important job on time.  Long-term stress is caused by ongoing stressful situations or events. Examples of long-term stress include long-term health problems, ongoing problems at work, or conflicts in your family. Long-term stress can harm your health.  How does stress affect your health?  When you are stressed, your body responds as though you are in danger. It makes hormones that speed up your heart, make you breathe faster, and give you a burst of energy. This is called the fight-or-flight stress response. If the stress is over quickly, your body goes back to normal and no harm is done.  But if stress happens too often or lasts  too long, it can have bad effects. Long-term stress can make you more likely to get sick, and it can make symptoms of some diseases worse. If you tense up when you are stressed, you may develop neck, shoulder, or low back pain. Stress is linked to high blood pressure and heart disease.  Stress also harms your emotional health. It can make you jane, tense, or depressed. Your relationships may suffer, and you may not do well at work or school.  What can you do to manage stress?  You can try these things to help manage stress:   Do something active. Exercise or activity can help reduce stress. Walking is a great way to get started. Even everyday activities such as housecleaning or yard work can help.  Try yoga or dory chi. These techniques combine exercise and meditation. You may need some training at first to learn them.  Do something you enjoy. For example, listen to music or go to a movie. Practice your hobby or do volunteer work.  Meditate. This can help you relax, because you are not worrying about what happened before or what may happen in the future.  Do guided imagery. Imagine yourself in any setting that helps you feel calm. You can use online videos, books, or a teacher to guide you.  Do breathing exercises. For example:  From a standing position, bend forward from the waist with your knees slightly bent. Let your arms dangle close to the floor.  Breathe in slowly and deeply as you return to a standing position. Roll up slowly and lift your head last.  Hold your breath for just a few seconds in the standing position.  Breathe out slowly and bend forward from the waist.  Let your feelings out. Talk, laugh, cry, and express anger when you need to. Talking with supportive friends or family, a counselor, or a frances leader about your feelings is a healthy way to relieve stress. Avoid discussing your feelings with people who make you feel worse.  Write. It may help to write about things that are bothering you. This  "helps you find out how much stress you feel and what is causing it. When you know this, you can find better ways to cope.  What can you do to prevent stress?  You might try some of these things to help prevent stress:  Manage your time. This helps you find time to do the things you want and need to do.  Get enough sleep. Your body recovers from the stresses of the day while you are sleeping.  Get support. Your family, friends, and community can make a difference in how you experience stress.  Limit your news feed. Avoid or limit time on social media or news that may make you feel stressed.  Do something active. Exercise or activity can help reduce stress. Walking is a great way to get started.  Where can you learn more?  Go to https://www.[employer].net/patiented  Enter N032 in the search box to learn more about \"Learning About Stress.\"  Current as of: October 24, 2023               Content Version: 14.0    6894-2052 [employer].   Care instructions adapted under license by your healthcare professional. If you have questions about a medical condition or this instruction, always ask your healthcare professional. [employer] disclaims any warranty or liability for your use of this information.      "

## 2024-05-09 NOTE — PROGRESS NOTES
Preventive Care Visit  [facility] KAMRAN Gross MD, Internal Medicine - Pediatrics  May 9, 2024      Assessment & Plan     Recurrent cold sores  None seen currently but patient would like a refill  - valACYclovir (VALTREX) 1000 mg tablet; Take 2 tablets (2,000 mg) by mouth 2 times daily    Partial seizures (H)  No recent seizures, on life-long keppra  - levETIRAcetam (KEPPRA) 500 MG tablet; Take 1 tablet (500 mg) by mouth daily    General counseling for prescription of oral contraceptives  - levonorgestrel-ethinyl estradiol (AVIANE) 0.1-20 MG-MCG tablet; Take 1 tablet by mouth daily    Acquired hypothyroidism  Noted to have increased TSH on last appointment, will check today and possibly increase dose  - levothyroxine (SYNTHROID/LEVOTHROID) 137 MCG tablet; Take 1 tablet (137 mcg) by mouth daily  - TSH with free T4 reflex; Future  - TSH with free T4 reflex    Mild episode of recurrent major depressive disorder (H24)  None currently  - sertraline (ZOLOFT) 50 MG tablet; Take 1 tablet (50 mg) by mouth daily    Routine general medical examination at a health care facility  Right should pain  Doing well, shoulder pain improving  - Lipid panel reflex to direct LDL Fasting  - continue PT    Mass of upper outer quadrant of right breast  Likely benign but will get diagnostic ultrasound and mammogram with her screening mammogram at end of month  - MA Diagnostic Digital Bilateral; Future  - US Breast Right Limited 1-3 Quadrants; Future        Counseling  Appropriate preventive services were discussed with this patient, including applicable screening as appropriate for fall prevention, nutrition, physical activity, Tobacco-use cessation, weight loss and cognition.  Checklist reviewing preventive services available has been given to the patient.  Reviewed patient's diet, addressing concerns and/or questions.   She is at risk for psychosocial distress and has been provided with information to  "reduce risk.           Subjective   Jenni is a 53 year old, presenting for the following:  Physical        5/9/2024     8:54 AM   Additional Questions   Roomed by Daniel Cloud   Accompanied by N/a         5/9/2024     8:54 AM   Patient Reported Additional Medications   Patient reports taking the following new medications No        Health Care Directive  Patient does not have a Health Care Directive or Living Will: Patient states has Advance Directive and will bring in a copy to clinic.    HPI      53 year old female with PMH of hypothyroidism, recurrent cold sores, mild depression, partial seizure disorder and chronic headaches who presents for annual wellness visit. She has been well overall. Headaches are less noticeable but still present. They are constant, low level, get worse with menstrual periods and then get better afterwards. Her menstrual periods are regular. Nothing else exacerbates her headaches, notably not laying down, exercise, looking at bright lights or other things she has noticed. She has not had any new symptoms since last visit. No changes to vision or hearing (though got hearing aids this past year), no sore throat, no chest pain, no cough, no abdominal pain, no melena, no diarrhea, no joint pain or swelling, no rashes or moles, no weight loss, no night sweats. Her right shoulder pain is improving slowly with PT, she has done this multiple times but never stuck through with her PT which has led to her right shoulder continuing to hurt her for multiple years, no preceding injury to her shoulder.  Her hearing has been getting worse for years, her mother also got hearing aids around her age. She has two children 23 and 25, her daughter is attending medical school year 3 and her son works at [employer]. She works for the [employer]. She lives alone with her cat named ToGoodData. She has a good group of friends. She has no \"down days\" and rates her mental health as good. She drinks 1 glass of wine per " day, no tobacco use, no marijuana, no IVDU. Exercise 3 times per week (exercise class twice per week and once per week volleyball). She eats breakfast lunch and dinner, B protein bar and coffee, L salad or sandwich or leftovers, D meat and veggies (veggies 60 percent of meal). She has a history of partial seizures in 2006, had two of them and has been on keppra since, no further seizures.    Prior hospitalization kidney stone, and two children  Surgeries: biopsy in breast    FH paternal grandparents cholesterol and father and blood pressure, mom side asthma, obesity and blood pressure    Paternal grandmother with breast cancer  Paternal grandfather with prostate cancer    No autoimmune disease, children are healthy              5/4/2024   General Health   How would you rate your overall physical health? Good   Feel stress (tense, anxious, or unable to sleep) Only a little   (!) STRESS CONCERN      5/4/2024   Nutrition   Three or more servings of calcium each day? (!) NO   Diet: Regular (no restrictions)   How many servings of fruit and vegetables per day? (!) 2-3   How many sweetened beverages each day? 0-1         5/4/2024   Exercise   Days per week of moderate/strenous exercise 4 days   Average minutes spent exercising at this level 40 min         5/4/2024   Social Factors   Frequency of gathering with friends or relatives Once a week   Worry food won't last until get money to buy more No   Food not last or not have enough money for food? No   Do you have housing?  Yes   Are you worried about losing your housing? No   Lack of transportation? No   Unable to get utilities (heat,electricity)? No         5/4/2024   Fall Risk   Fallen 2 or more times in the past year? No   Trouble with walking or balance? No          5/4/2024   Dental   Dentist two times every year? Yes         5/4/2024   TB Screening   Were you born outside of the [place]? No       Today's PHQ-9 Score:       5/9/2024     8:47 AM   PHQ-9 SCORE   PHQ-9 Total  "Score MyChart 0   PHQ-9 Total Score 0         5/4/2024   Substance Use   Alcohol more than 3/day or more than 7/wk No   Do you use any other substances recreationally? No     Social History     Tobacco Use    Smoking status: Never    Smokeless tobacco: Never   Vaping Use    Vaping status: Never Used   Substance Use Topics    Alcohol use: Yes     Comment: occasionally    Drug use: No           5/24/2023   LAST FHS-7 RESULTS   1st degree relative breast or ovarian cancer Yes   Any relative bilateral breast cancer No   Any male have breast cancer No   Any ONE woman have BOTH breast AND ovarian cancer No   Any woman with breast cancer before 50yrs No   2 or more relatives with breast AND/OR ovarian cancer No   2 or more relatives with breast AND/OR bowel cancer No                5/4/2024   STI Screening   New sexual partner(s) since last STI/HIV test? No     History of abnormal Pap smear: NO - age 30-65 PAP every 5 years with negative HPV co-testing recommended        9/28/2021     4:42 PM   PAP / HPV   PAP-ABSTRACT See Scanned Document           This result is from an external source.     ASCVD Risk   The 10-year ASCVD risk score (Pablo DK, et al., 2019) is: 0.9%    Values used to calculate the score:      Age: 53 years      Sex: Female      Is Non- : No      Diabetic: No      Tobacco smoker: No      Systolic Blood Pressure: 110 mmHg      Is BP treated: No      HDL Cholesterol: 64 mg/dL      Total Cholesterol: 189 mg/dL           Reviewed and updated as needed this visit by Provider                    ROS negative unless noted above     Objective    Exam  /66 (BP Location: Right arm, Patient Position: Sitting, Cuff Size: Adult Regular)   Pulse 61   Temp 98.4  F (36.9  C) (Tympanic)   Resp 18   Ht 1.698 m (5' 6.85\")   Wt 65 kg (143 lb 3.2 oz)   LMP 04/09/2024   SpO2 100%   BMI 22.53 kg/m     Estimated body mass index is 22.53 kg/m  as calculated from the following:    " "Height as of this encounter: 1.698 m (5' 6.85\").    Weight as of this encounter: 65 kg (143 lb 3.2 oz).    Physical Exam  GENERAL: alert and no distress  EYES: Eyes grossly normal to inspection, PERRL and conjunctivae and sclerae normal  HENT: ear canals and TM's normal, nose and mouth without ulcers or lesions  NECK: no adenopathy, no asymmetry, masses, or scars  RESP: lungs clear to auscultation - no rales, rhonchi or wheezes  CV: regular rate and rhythm, normal S1 S2, no S3 or S4, no murmur, click or rub, no peripheral edema  ABDOMEN: soft, nontender, no masses and bowel sounds normal  BREAST: small  ~1 cm round hard lump in RUQ of right breast at 10 O'clock position   MS: no gross musculoskeletal defects noted, no edema  SKIN: no suspicious lesions or rashes  NEURO: Normal strength and tone, mentation intact and speech normal  PSYCH: mentation appears normal, affect normal/bright        Signed Electronically by: Figueroa Gross MD, PhD    Answers submitted by the patient for this visit:  Patient Health Questionnaire (Submitted on 5/9/2024)  If you checked off any problems, how difficult have these problems made it for you to do your work, take care of things at home, or get along with other people?: Not difficult at all  PHQ9 TOTAL SCORE: 0    I have seen this patient and I was present during all critical and key portions of the procedure/exam and immediately available to furnish services during the entire service. I have reviewed the documentation from this resident and discussed the findings with them, and I agree with the documentation their documentation.      Chidi Olivier MD  Internal Medicine and Pediatrics   "

## 2024-05-21 ENCOUNTER — HOSPITAL ENCOUNTER (OUTPATIENT)
Dept: MAMMOGRAPHY | Facility: CLINIC | Age: 54
Discharge: HOME | End: 2024-05-21
Attending: INTERNAL MEDICINE
Payer: COMMERCIAL

## 2024-05-21 ENCOUNTER — HOSPITAL ENCOUNTER (OUTPATIENT)
Dept: ULTRASOUND IMAGING | Facility: CLINIC | Age: 54
Discharge: HOME | End: 2024-05-21
Attending: INTERNAL MEDICINE
Payer: COMMERCIAL

## 2024-05-21 DIAGNOSIS — N63.11 MASS OF UPPER OUTER QUADRANT OF RIGHT BREAST: ICD-10-CM

## 2024-05-21 PROCEDURE — 250N000009 HC RX 250: Performed by: RADIOLOGY

## 2024-05-21 PROCEDURE — 999N000065 MA POST PROCEDURE RIGHT

## 2024-05-21 PROCEDURE — 76642 ULTRASOUND BREAST LIMITED: CPT | Mod: RT

## 2024-05-21 PROCEDURE — 88305 TISSUE EXAM BY PATHOLOGIST: CPT | Mod: 26 | Performed by: PATHOLOGY

## 2024-05-21 PROCEDURE — 88360 TUMOR IMMUNOHISTOCHEM/MANUAL: CPT | Mod: TC | Performed by: INTERNAL MEDICINE

## 2024-05-21 PROCEDURE — 272N000615 US BREAST BIOPSY CORE NEEDLE RIGHT

## 2024-05-21 PROCEDURE — 77062 BREAST TOMOSYNTHESIS BI: CPT

## 2024-05-21 PROCEDURE — 88360 TUMOR IMMUNOHISTOCHEM/MANUAL: CPT | Mod: 26 | Performed by: PATHOLOGY

## 2024-05-21 RX ADMIN — LIDOCAINE HYDROCHLORIDE 5 ML: 10 INJECTION, SOLUTION EPIDURAL; INFILTRATION; INTRACAUDAL; PERINEURAL at 08:29

## 2024-05-21 NOTE — DISCHARGE INSTRUCTIONS

## 2024-05-23 ENCOUNTER — TELEPHONE (OUTPATIENT)
Dept: ONCOLOGY | Facility: CLINIC | Age: 54
End: 2024-05-23

## 2024-05-23 DIAGNOSIS — C50.919 BREAST CANCER (H): Primary | ICD-10-CM

## 2024-05-23 LAB
PATH REPORT.COMMENTS IMP SPEC: ABNORMAL
PATH REPORT.COMMENTS IMP SPEC: YES
PATH REPORT.FINAL DX SPEC: ABNORMAL
PATH REPORT.GROSS SPEC: ABNORMAL
PATH REPORT.MICROSCOPIC SPEC OTHER STN: ABNORMAL
PATH REPORT.RELEVANT HX SPEC: ABNORMAL
PATHOLOGY SYNOPTIC REPORT: ABNORMAL
PHOTO IMAGE: ABNORMAL

## 2024-05-23 NOTE — TELEPHONE ENCOUNTER
Pathology report reviewed with our breast radiologist Dr Simon, who confirmed the recent breast imaging is concordant with the final surgical pathology results below.    I phoned Ms. 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     Patient states no problems with biopsy site.  Recommended follow up is surgical and oncology consults.  Surgical Consult has been arranged with Dr Patel on 6-4-24 at 1100.  Oncology Consult has been arranged with Dr Mariee on 6-4-24 at 1200.    Patient has directions and phone numbers.    Questions were answered and I explained my role as Breast Care Nurse Coordinator in assisting her with appointments, resources and social support.  New diagnosis information packet will be available for patient at surgical consult.  I will follow up with the patient. She has my phone number if she has further questions.  Patient verbalized understanding and agrees with the plan of care.  Ordering provider- Dr Olivier has been notified of the results, recommendations for follow up and scheduled surgical consultation.  I will forward this note, along with the pathology results.      Jelly Stewart RN CBCN  Breast Care Nurse Coordinator  [facility]  [phone]

## 2024-05-29 ENCOUNTER — HOSPITAL ENCOUNTER (OUTPATIENT)
Dept: MRI IMAGING | Facility: CLINIC | Age: 54
Discharge: HOME | End: 2024-05-29
Attending: SURGERY | Admitting: SURGERY
Payer: COMMERCIAL

## 2024-05-29 ENCOUNTER — TELEPHONE (OUTPATIENT)
Dept: MAMMOGRAPHY | Facility: CLINIC | Age: 54
End: 2024-05-29
Payer: COMMERCIAL

## 2024-05-29 DIAGNOSIS — C50.919 BREAST CANCER (H): ICD-10-CM

## 2024-05-29 DIAGNOSIS — C50.919 BREAST CANCER (H): Primary | ICD-10-CM

## 2024-05-29 PROCEDURE — A9585 GADOBUTROL INJECTION: HCPCS | Performed by: SURGERY

## 2024-05-29 PROCEDURE — 77049 MRI BREAST C-+ W/CAD BI: CPT

## 2024-05-29 PROCEDURE — 255N000002 HC RX 255 OP 636: Performed by: SURGERY

## 2024-05-29 RX ORDER — GADOBUTROL 604.72 MG/ML
6.5 INJECTION INTRAVENOUS ONCE
Status: COMPLETED | OUTPATIENT
Start: 2024-05-29 | End: 2024-05-29

## 2024-05-29 RX ADMIN — GADOBUTROL 6.5 ML: 604.72 INJECTION INTRAVENOUS at 09:30

## 2024-05-29 NOTE — TELEPHONE ENCOUNTER
Dr Patel has reviewed and agrees with plan to assist patient with scheduling recommended imaging follow up.  Called patient with MRI results and recommendations.  She will return to clinic on Monday 6-3-24 at 1200 for US and possible US core biopsy.  She will continue with plan to see Dr Patel and Dr Mariee on Tuesday 6-4-24.

## 2024-05-30 NOTE — TELEPHONE ENCOUNTER
RECORDS STATUS - BREAST    RECORDS REQUESTED FROM:    Appt Date: 6/4/2024    NOTES DETAILS STATUS   OFFICE NOTE from referring provider     OPERATIVE REPORT Complete See Breast Biopsy in EPIC   MEDICATION LIST Complete [facility]   CLINICAL TRIAL TREATMENTS TO DATE     LABS     REQUEST BLOCKS FOR ALL BREAST CANCER PTS     PATHOLOGY REPORTS  (Tissue diagnosis, Stage, ER/KS percentage positive and intensity of staining, HER2 IHC, FISH, and all biopsies from breast and any distant metastasis)                 See internal breast Biopsy  5/21/2024   A. Breast, right, 12:00, 6 cm from nipple (1.6 cm mass), ultrasound-guided needle core biopsy:  -Invasive ductal carcinoma, [place] grade 2 of 3 (tubule formation: 3, nuclear grade: 2, mitoses: 1), 7 mm in greatest  size.     PATHOLOGY FEDEX TRACKING:   TRACKING #:   GENONOMIC TESTING     TYPE:   (Next Generation Sequencing, including Foundation One testing, and Oncotype score)     IMAGING (NEED IMAGES & REPORT)     MRI Complete MRI Breast 5/29/2024     MRI Brain 12/13/2023    MAMMO Complete    [facility] scans are in PACS 5/21/2024 more in PACS    [facility] 3/7/2022   ULTRASOUND Complete US Breast 5/21/2024 more in PACS   IMAGE DISC FEDEX TRACKING    TRACKING # :

## 2024-06-03 ENCOUNTER — HOSPITAL ENCOUNTER (OUTPATIENT)
Dept: ULTRASOUND IMAGING | Facility: CLINIC | Age: 54
Discharge: HOME | End: 2024-06-03
Attending: SURGERY
Payer: COMMERCIAL

## 2024-06-03 ENCOUNTER — HOSPITAL ENCOUNTER (OUTPATIENT)
Dept: MAMMOGRAPHY | Facility: CLINIC | Age: 54
Discharge: HOME | End: 2024-06-03
Attending: SURGERY
Payer: COMMERCIAL

## 2024-06-03 DIAGNOSIS — C50.919 BREAST CANCER (H): ICD-10-CM

## 2024-06-03 PROCEDURE — 88305 TISSUE EXAM BY PATHOLOGIST: CPT | Mod: 26 | Performed by: PATHOLOGY

## 2024-06-03 PROCEDURE — 76642 ULTRASOUND BREAST LIMITED: CPT | Mod: RT

## 2024-06-03 PROCEDURE — 272N000615 US BREAST BIOPSY CORE NEEDLE RIGHT

## 2024-06-03 PROCEDURE — 999N000065 MA POST PROCEDURE RIGHT

## 2024-06-03 PROCEDURE — 88305 TISSUE EXAM BY PATHOLOGIST: CPT | Mod: TC | Performed by: SURGERY

## 2024-06-03 PROCEDURE — 250N000009 HC RX 250: Performed by: RADIOLOGY

## 2024-06-03 RX ADMIN — LIDOCAINE HYDROCHLORIDE 5 ML: 10 INJECTION, SOLUTION EPIDURAL; INFILTRATION; INTRACAUDAL; PERINEURAL at 12:38

## 2024-06-03 NOTE — DISCHARGE INSTRUCTIONS

## 2024-06-04 ENCOUNTER — PATIENT OUTREACH (OUTPATIENT)
Dept: ONCOLOGY | Facility: CLINIC | Age: 54
End: 2024-06-04

## 2024-06-04 ENCOUNTER — OFFICE VISIT (OUTPATIENT)
Dept: SURGERY | Facility: CLINIC | Age: 54
End: 2024-06-04
Attending: SURGERY
Payer: COMMERCIAL

## 2024-06-04 ENCOUNTER — OFFICE VISIT (OUTPATIENT)
Dept: ONCOLOGY | Facility: CLINIC | Age: 54
End: 2024-06-04
Attending: SURGERY
Payer: COMMERCIAL

## 2024-06-04 ENCOUNTER — PRE VISIT (OUTPATIENT)
Dept: ONCOLOGY | Facility: CLINIC | Age: 54
End: 2024-06-04
Payer: COMMERCIAL

## 2024-06-04 VITALS
SYSTOLIC BLOOD PRESSURE: 109 MMHG | DIASTOLIC BLOOD PRESSURE: 71 MMHG | RESPIRATION RATE: 16 BRPM | OXYGEN SATURATION: 99 % | HEART RATE: 66 BPM | BODY MASS INDEX: 22.44 KG/M2 | WEIGHT: 143 LBS | HEIGHT: 67 IN

## 2024-06-04 VITALS
HEART RATE: 66 BPM | BODY MASS INDEX: 22.44 KG/M2 | DIASTOLIC BLOOD PRESSURE: 71 MMHG | OXYGEN SATURATION: 99 % | HEIGHT: 67 IN | RESPIRATION RATE: 16 BRPM | WEIGHT: 143 LBS | SYSTOLIC BLOOD PRESSURE: 109 MMHG

## 2024-06-04 DIAGNOSIS — Z17.0 MALIGNANT NEOPLASM OF CENTRAL PORTION OF RIGHT BREAST IN FEMALE, ESTROGEN RECEPTOR POSITIVE (H): Primary | ICD-10-CM

## 2024-06-04 DIAGNOSIS — C50.911 INVASIVE DUCTAL CARCINOMA OF RIGHT BREAST, STAGE 1 (H): Primary | ICD-10-CM

## 2024-06-04 DIAGNOSIS — C50.111 MALIGNANT NEOPLASM OF CENTRAL PORTION OF RIGHT BREAST IN FEMALE, ESTROGEN RECEPTOR POSITIVE (H): Primary | ICD-10-CM

## 2024-06-04 PROCEDURE — 99213 OFFICE O/P EST LOW 20 MIN: CPT | Performed by: SURGERY

## 2024-06-04 PROCEDURE — 99205 OFFICE O/P NEW HI 60 MIN: CPT | Performed by: INTERNAL MEDICINE

## 2024-06-04 PROCEDURE — 99205 OFFICE O/P NEW HI 60 MIN: CPT | Performed by: SURGERY

## 2024-06-04 NOTE — PROGRESS NOTES
Jenni Torres is a 53 year premenopausal women seen today at multidisciplinary clinic with Dr Patel     Chief complaint:  1.  New diagnosis of right-sided breast cancer, infiltrating ductal carcinoma grade 2 at the 12 o'clock position of the right breast ER positive KY positive HER2 receptor +1.3 cm in maximum dimension  2.  Second lesion at the 9:30 position 0.6 cm was biopsied yesterday and the results of that biopsy are pending  3.  Here for medical oncology consultation prior to any surgery      History presenting complaint    Rosana is a 53-year-old premenopausal patient who felt a lump on the right breast in the central portion but a couple months ago.  Prior to that she had no major medical problems apart from hypothyroidism.    She underwent a diagnostic bilateral mammogram and ultrasound.  At the 12 o'clock position there was a suspicious lesion less than 2 cm and a biopsy was recommended.      An initial biopsy has revealed an invasive ductal carcinoma grade 2 ER/KY both strongly positive.  Ki-67 is 15% HER2 receptor is also positive.        She then went and had an MRI scan done on 5/29/2024.  This showed a 1.3 cm mass at the 12 o'clock position and in addition there was an indeterminate 0.6 cm mass at the 9:30 position of the right breast.  The clinical level of suspicion suspicion for this lesion is low based on the radiological finding but she did have a biopsy yesterday and we waiting on the result.  There was no evidence of any axillary or mammary chain lymphadenopathy and nothing for malignancy in the left breast.      Patient is here prior to having any surgery done to discuss her results so far and the treatment plan      Family history:    Paternal grandmother with breast cancer at an unknown age  No history of ovarian or colon cancer  Paternal grandfather with prostate cancer    Patient has 1 brother with no cancer.      Past medical history:  New diagnosis of HER2 positive right-sided breast  cancer  History of hypothyroidism since 2000  History of nighttime seizures since 2006 currently on Keppra    Past surgical history:  History of breast biopsy  History of wisdom teeth removed    Medications and allergies are outlined in the nursing records    Social history:  Patient lives alone she has 2 adult children, 1 son and 1 daughter.  She is a known smoker drinks alcohol socially.      Menstrual history:  Patient is premenopausal she is not on any oral contraceptive pills she has regular cycles    Comprehensive review of systems    14 point comprehensive review of systems has been done with her today and is otherwise negative.      On physical exam:    Well-appearing lady no acute distress  She alert and orientated x 3  Neck is no masses or lymph nodes  Chest clear to auscultation percussion bilaterally  Heart sounds 1-2 normal no added sounds no murmurs  Breast exam:    On the right breast the patient has a palpable small tumor measuring about 1-1 and half centimeters at the 12 o'clock position of the right breast.  I do not feel any other palpable masses.  Right axilla is negative    Left breast normal no palpable masses left axilla negative      GI exam normal  Legs without tenderness or edema  Psychiatric exam normal      Data review:    I have reviewed all the patient's imaging today as well as all of her pathology and discussed it with her.    I have also discussed her case with Dr. Patel and the radiologist    We are awaiting her pathology from the second lesion which was biopsied yesterday.      Impression and plan:    Is a 53-year-old premenopausal patient who was diagnosed with right-sided triple positive breast cancer at the 12 o'clock position of the right breast.  It appears to be a small tumor with negative lymph nodes and the Ki-67 was 15%.    If she only has this 1 small lesion and the second lesion comes back negative, I would be in favor of removing the lesion first and then consider  de-escalation of therapy depending on her final pathology.    If the second lesion comes back positive then I will consider doing neoadjuvant chemotherapy and HER2 directed therapy upfront.      Patient will be a candidate for both chemotherapy, HER2 directed therapy and hormonal therapy as part of her treatment.    We discussed the 2 scenarios today and I will be in touch with her once I have results on the pathology of her second lesion.  She will need to come in for formal chemotherapy teaching to my office.      She is also interested in seeing the genetics team to consider getting genetic testing and have set that up for her today.    The plan has been discussed with her as well as with Dr. Patel  We will make formal recommendations on chemotherapy plans once we see the pathology from the second lesion.        Total time spent today 1 hour      Jonathon Mariee MD

## 2024-06-04 NOTE — LETTER
"6/4/2024      Jenni BOWENS Melissa  [address]  [place] [address]      Dear Colleague,    Thank you for referring your patient, Jenni Torres, to the [facility]. Please see a copy of my visit note below.    Oncology Rooming Note    June 4, 2024 10:53 AM   Jenni Torres is a 53 year old female who presents for:    Chief Complaint   Patient presents with     New Patient     Complete KEB, Right breast IDC     Initial Vitals: /71   Pulse 66   Resp 16   Ht 1.698 m (5' 6.85\")   Wt 64.9 kg (143 lb)   LMP 04/09/2024   SpO2 99%   BMI 22.50 kg/m   Estimated body mass index is 22.5 kg/m  as calculated from the following:    Height as of this encounter: 1.698 m (5' 6.85\").    Weight as of this encounter: 64.9 kg (143 lb). Body surface area is 1.75 meters squared.  Data Unavailable Comment: Data Unavailable   Patient's last menstrual period was 04/09/2024.  Allergies reviewed: Yes  Medications reviewed: Yes    Medications: Medication refills not needed today.  Pharmacy name entered into EPIC:    [facility] - [facility] [place] - [address]  [facility] [id number] [place] [facility] - [place], [place] - [address]    Clinical concerns: none  Dr. Mariee was notified.      Jaci Rivero MA               Jenni Torres is a 53 year premenopausal women seen today at multidisciplinary clinic with Dr Patel     Chief complaint:  1.  New diagnosis of right-sided breast cancer, infiltrating ductal carcinoma grade 2 at the 12 o'clock position of the right breast ER positive [place] positive HER2 receptor +1.3 cm in maximum dimension  2.  Second lesion at the 9:30 position 0.6 cm was biopsied yesterday and the results of that biopsy are pending  3.  Here for medical oncology consultation prior to any surgery      History presenting complaint    Rosana is a 53-year-old premenopausal patient who felt a lump on the right breast in the central portion but a couple months " ago.  Prior to that she had no major medical problems apart from hypothyroidism.    She underwent a diagnostic bilateral mammogram and ultrasound.  At the 12 o'clock position there was a suspicious lesion less than 2 cm and a biopsy was recommended.      An initial biopsy has revealed an invasive ductal carcinoma grade 2 ER/MS both strongly positive.  Ki-67 is 15% HER2 receptor is also positive.        She then went and had an MRI scan done on 5/29/2024.  This showed a 1.3 cm mass at the 12 o'clock position and in addition there was an indeterminate 0.6 cm mass at the 9:30 position of the right breast.  The clinical level of suspicion suspicion for this lesion is low based on the radiological finding but she did have a biopsy yesterday and we waiting on the result.  There was no evidence of any axillary or mammary chain lymphadenopathy and nothing for malignancy in the left breast.      Patient is here prior to having any surgery done to discuss her results so far and the treatment plan      Family history:    Paternal grandmother with breast cancer at an unknown age  No history of ovarian or colon cancer  Paternal grandfather with prostate cancer    Patient has 1 brother with no cancer.      Past medical history:  New diagnosis of HER2 positive right-sided breast cancer  History of hypothyroidism since 2000  History of nighttime seizures since 2006 currently on Keppra    Past surgical history:  History of breast biopsy  History of wisdom teeth removed    Medications and allergies are outlined in the nursing records    Social history:  Patient lives alone she has 2 adult children, 1 son and 1 daughter.  She is a known smoker drinks alcohol socially.      Menstrual history:  Patient is premenopausal she is not on any oral contraceptive pills she has regular cycles    Comprehensive review of systems    14 point comprehensive review of systems has been done with her today and is otherwise negative.      On physical  exam:    Well-appearing lady no acute distress  She alert and orientated x 3  Neck is no masses or lymph nodes  Chest clear to auscultation percussion bilaterally  Heart sounds 1-2 normal no added sounds no murmurs  Breast exam:    On the right breast the patient has a palpable small tumor measuring about 1-1 and half centimeters at the 12 o'clock position of the right breast.  I do not feel any other palpable masses.  Right axilla is negative    Left breast normal no palpable masses left axilla negative      GI exam normal  Legs without tenderness or edema  Psychiatric exam normal      Data review:    I have reviewed all the patient's imaging today as well as all of her pathology and discussed it with her.    I have also discussed her case with Dr. Patel and the radiologist    We are awaiting her pathology from the second lesion which was biopsied yesterday.      Impression and plan:    Is a 53-year-old premenopausal patient who was diagnosed with right-sided triple positive breast cancer at the 12 o'clock position of the right breast.  It appears to be a small tumor with negative lymph nodes and the Ki-67 was 15%.    If she only has this 1 small lesion and the second lesion comes back negative, I would be in favor of removing the lesion first and then consider de-escalation of therapy depending on her final pathology.    If the second lesion comes back positive then I will consider doing neoadjuvant chemotherapy and HER2 directed therapy upfront.      Patient will be a candidate for both chemotherapy, HER2 directed therapy and hormonal therapy as part of her treatment.    We discussed the 2 scenarios today and I will be in touch with her once I have results on the pathology of her second lesion.  She will need to come in for formal chemotherapy teaching to my office.      She is also interested in seeing the genetics team to consider getting genetic testing and have set that up for her today.    The plan has  been discussed with her as well as with Dr. Patel  We will make formal recommendations on chemotherapy plans once we see the pathology from the second lesion.        Total time spent today 1 hour      Jonathon Mariee MD               Again, thank you for allowing me to participate in the care of your patient.        Sincerely,        Jonathon Mariee MD

## 2024-06-04 NOTE — PROGRESS NOTES
"Oncology Rooming Note    June 4, 2024 10:53 AM   Jenni Torres is a 53 year old female who presents for:    Chief Complaint   Patient presents with    New Patient     Complete KEB, Right breast IDC     Initial Vitals: /71   Pulse 66   Resp 16   Ht 1.698 m (5' 6.85\")   Wt 64.9 kg (143 lb)   LMP 04/09/2024   SpO2 99%   BMI 22.50 kg/m   Estimated body mass index is 22.5 kg/m  as calculated from the following:    Height as of this encounter: 1.698 m (5' 6.85\").    Weight as of this encounter: 64.9 kg (143 lb). Body surface area is 1.75 meters squared.  Data Unavailable Comment: Data Unavailable   Patient's last menstrual period was 04/09/2024.  Allergies reviewed: Yes  Medications reviewed: Yes    Medications: Medication refills not needed today.  Pharmacy name entered into EPIC:    [facility] - [place], [place] - [address]  [facility] [address] [place], [place] - [address]    Clinical concerns: none  Dr. Mariee was notified.      Jaci Rivero MA             "

## 2024-06-04 NOTE — PROGRESS NOTES
[facility]: Cancer Care Initial Note                                    Discussion with Patient:                                                      Writer met face to face with Jenni in the breast center to introduce self and the role of the RNCC. Provided Dr. Mariee's card with clinic contact information.     Assessment:                                                      Initial  Current living arrangement:: I live alone  Type of residence:: Apartment  Informal Support system:: Children;Friends  Equipment Currently Used at Home: none  Bed or wheelchair confined:: No  Mobility Status: Independent  Transportation means:: Regular car  Medication adherence problem (GOAL):: No  Knowledgeable about how to use meds:: Yes  Medication side effects suspected:: No  Referrals Placed: None  Advanced Care Plans/Directives on file:: No  Patient Reported Pain?: No  Pain Score: No Pain (0)    Intervention/Education provided during outreach:                                                       Dr. Mariee is awaiting second biopsy results in order to finalize plan of care for Jenni. Writer will monitor for biopsy results and be in touch with Jenni on next steps.    Maureen Casanova RN, BSN, OCN, CBCN  Nurse Care Coordinator  [facility] [place]  P: [phone]     F: [phone]

## 2024-06-04 NOTE — PROGRESS NOTES
Breast Health History Form    Do you have any of the following symptoms? Lump/Masses     In which breast are you having the symptoms? Right  Have you had a mammogram? Yes        Have you ever had a breast cyst drained? Yes        Have you had a breast biopsy in the past? Yes        Have you ever had Breast Cancer? Yes        Is there a history of Breast Cancer in your family? Yes     Have you ever had Ovarian Cancer? No     Is there a history of Ovarian Cancer in your family? No     Is there a history of Colon Cancer in your family? Yes     Is there a history of Uterine Cancer in your family? No     Any known genetic abnormalities in your family? No     Summarize your caffeine intake? 1 soda per day    Were you born with a uterus? Yes  What age did your periods begin? 16  Date your last menstrual period began? 5/9/24  Number of full term pregnancies? 2  Your age when your first child was born? 30  Did you nurse your children? Yes  Are you pregnant now? No  Have you begun Menopause? No     Have you had either ovary removed? No     Have you had an intrauterine device (IUD) placed? No         Do you have breast implants? No  Have you used hormone replacement therapy? No        Have you taken oral contraceptive pills? Yes     What is your current bra size? 36a

## 2024-06-04 NOTE — PROGRESS NOTES
[facility]  New Patient Office Visit    Assessment:    Jenni is a 53 year old female with right breast invasive ductal carcinoma, grade 2, ER +, UT +, [id number] + measuring 1.6 cm (US, 1.3 by MRI) at 12:00 and 6 cm from the nipple, posterior depth. MRI showed another suspicious nodule at 9:30 posterior depth in the right breast, which was biopsied by ultrasound guidance yesterday and that pathology is pending.     Plan:  We have had a detailed discussion on treatment of breast cancer which may include a combination of surgery, chemotherapy, endocrine therapy, radiation of which the use and timing depending on the specifics of their cancer.     If the 9:30 lesion is benign breast tissue we will plan to proceed with upfront surgery.  If this shows a synchronous cancer we will need to proceed with neoadjuvant chemotherapy given the positive HER2.  Discussed with Dr. Mariee today in multidisciplinary breast clinic.    Surgical options were discussed including lumpectomy, mastectomy, bilateral mastectomies, sentinel lymph node biopsy and reconstructive options have been offered and discussed at length.  I feel the cosmetic outcome with lumpectomy would be optimal and she is interested in lumpectomy.    She is interested in genetic testing.  She does not have a strong family history so I do not suspect she has a genetic mutation.  In discussion with Dr. Mariee she will likely refer her to the genetic counselor for future testing.  If this does come back positive in the future we can always proceed with risk reducing prophylactic bilateral mastectomies.    I have counseled her to stop her estrogen containing OCP.    The current NCCN guidelines for axillary lymph node metastases is there is no need for axillary dissection for a T1-T2 tumor and 1-2 positive lymph nodes (but clinically negative) for upfront surgery whether that includes lumpectomy or mastectomy.    We have discussed the indication, alternatives,  risks and expected recovery.  Specifically, we have discussed local recurrence of cancer, risk of future second primary breast cancer, incisions, scarring, postoperative infection, anesthesia, bleeding, blood transfusion, DVT, postoperative restrictions and physical limitations.  We have discussed the recommended interventions and treatments for these outcomes.    All questions have been answered to the best of my ability.    Tentatively elects for right lumpectomy and right sentinel lymph node biopsy if the recently biopsied lesion is benign.  Otherwise we will proceed with port placement for neoadjuvant chemotherapy    Recommended time off work postop:  1 wks    HPI:  Jenni Torres is a 53 year old female who presents to discuss her recently diagnosed invasive ductal carcinoma.  This was diagnosed after patient felt a lump.  Diagnostic mammogram, ultrasound and biopsy was done 5/21/2024.  She had MRI 5/29/2024 which showed an additional lesion in the right breast and this was biopsied yesterday under ultrasound guidance.  Skin rashes, dimpling or nipple changes:  none  Nipple discharge:  none  Breast pain:   No  Perform self breast exams: Yes    Previous breast biopsies: No  Previous cyst biopsy    Hormonal history:    First menstrual period: 16 years old  First live birth: 30 years old  pre menopausal, on OCP  HRT No  Fertility treatment: No    Family History:  Family history of breast cancer: Yes -paternal grandmother, not sure of age  Family history of ovarian cancer:  No  Family history of colon cancer: Yes -paternal grandfather not sure of age  Family history of prostate cancer: No  No family history on file.    Imaging:   All imaging studies reviewed by me, I have reviewed these studies with radiology.    Recent Results (from the past 744 hour(s))   MA Diagnostic Bilateral w/Melva    Narrative    MA DIAGNOSTIC BILATERAL W/ MELVA,   US BREAST RIGHT LIMITED 1-3 QUADRANTS -  5/21/2024 9:16 AM    HISTORY:   Palpable right breast lump.    COMPARISON:  5/24/2023, 3/7/2022, 8/29/2019    BREAST DENSITY: The breasts are heterogeneously dense which may  obscure small masses.    FINDINGS:  Bilateral diagnostic views were obtained with  tomosynthesis. Marker was placed on the right breast at the area of  concern; deep to the marker there is a small mass with a mildly  spiculated margin. There are no calcifications within the mass.  Previously biopsied right breast calcifications appear stable. The  pattern of glandular tissue in the left breast is similarly stable.    Further evaluation with targeted right breast ultrasound shows a  hypoechoic mass at the 12:00 position, 6 cm from the nipple, measuring  1.5 x 0.8 x 1.6 cm. Survey of the axilla shows no evidence of  adenopathy. A benign lymph node measures 3.1 x 0.9 x 1.2 cm.      Impression    IMPRESSION: BI-RADS CATEGORY: 4 - Suspicious.    Hypoechoic mass at the 12:00 position of the right breast corresponds  to the palpable lump. Ultrasound-guided core biopsy is performed  subsequently.    RECOMMENDED FOLLOW-UP: Biopsy.        JAMIN ALFONSO MD         SYSTEM ID:  [id number]   US Breast Right Limited 1-3 Quadrants    Narrative    MA DIAGNOSTIC BILATERAL W/ MELVA,   US BREAST RIGHT LIMITED 1-3 QUADRANTS -  5/21/2024 9:16 AM    HISTORY:  Palpable right breast lump.    COMPARISON:  5/24/2023, 3/7/2022, 8/29/2019    BREAST DENSITY: The breasts are heterogeneously dense which may  obscure small masses.    FINDINGS:  Bilateral diagnostic views were obtained with  tomosynthesis. Marker was placed on the right breast at the area of  concern; deep to the marker there is a small mass with a mildly  spiculated margin. There are no calcifications within the mass.  Previously biopsied right breast calcifications appear stable. The  pattern of glandular tissue in the left breast is similarly stable.    Further evaluation with targeted right breast ultrasound shows a  hypoechoic mass at the  12:00 position, 6 cm from the nipple, measuring  1.5 x 0.8 x 1.6 cm. Survey of the axilla shows no evidence of  adenopathy. A benign lymph node measures 3.1 x 0.9 x 1.2 cm.      Impression    IMPRESSION: BI-RADS CATEGORY: 4 - Suspicious.    Hypoechoic mass at the 12:00 position of the right breast corresponds  to the palpable lump. Ultrasound-guided core biopsy is performed  subsequently.    RECOMMENDED FOLLOW-UP: Biopsy.        JAMIN ALFONSO MD         SYSTEM ID:  [id number]   US Breast Biopsy Core Needle Right    Addendum: 5/23/2024    FATIMAH DONALD  [id number], [id number]    FINAL DIAGNOSIS: Grade 2 invasive ductal carcinoma. Please see final  path report.    Result is concordant.  Surgical and oncologic consultation will be  arranged.      Jamin Alfonso MD   Date of Addendum: 5/23/2024    JAMIN ALFONSO MD         SYSTEM ID:  [id number]      Narrative    ULTRASOUND-GUIDED RIGHT BREAST CORE BIOPSY;   CLIP PLACEMENT;   POSTPROCEDURE DIGITAL MAMMOGRAM; 5/21/2024 9:19 AM    INDICATION FOR PROCEDURE: Hypoechoic mass at the 12:00 position of the  right breast.    PROCEDURE: Written informed consent is obtained from the patient prior  to the procedure. The risks and benefits are discussed and the patient  wishes to continue.  Approximately 3 mL lidocaine without epinephrine  was infiltrated for local anesthetic and a skin nick was made through  which a 13-gauge trocar was introduced via medial to lateral approach.   The needle tip was placed adjacent to the lesion. A series of 3  samples were obtained with a 14-gauge core-cutting needle. A  coil-shaped clip was then deployed to morgan the lesion.     Postbiopsy unilateral digital mammogram of the right breast showed the  clip to be appropriately placed.  The patient tolerated the procedure  without difficulty and there was no immediate complication. Less than  5cc blood loss.  No pain following biopsy.      Impression    IMPRESSION: Successful right breast ultrasound-guided  core biopsy and  clip placement.  Final pathology is pending.      JAMIN ALFONSO MD         SYSTEM ID:  [id number]   MA Post Procedure Right    Addendum: 5/23/2024    FATIMAH DONALD  [id number], [id number]    FINAL DIAGNOSIS: Grade 2 invasive ductal carcinoma. Please see final  path report.    Result is concordant.  Surgical and oncologic consultation will be  arranged.      Jamin Alfonso MD   Date of Addendum: 5/23/2024    JAMIN ALFONSO MD         SYSTEM ID:  [id number]      Narrative    ULTRASOUND-GUIDED RIGHT BREAST CORE BIOPSY;   CLIP PLACEMENT;   POSTPROCEDURE DIGITAL MAMMOGRAM; 5/21/2024 9:19 AM    INDICATION FOR PROCEDURE: Hypoechoic mass at the 12:00 position of the  right breast.    PROCEDURE: Written informed consent is obtained from the patient prior  to the procedure. The risks and benefits are discussed and the patient  wishes to continue.  Approximately 3 mL lidocaine without epinephrine  was infiltrated for local anesthetic and a skin nick was made through  which a 13-gauge trocar was introduced via medial to lateral approach.   The needle tip was placed adjacent to the lesion. A series of 3  samples were obtained with a 14-gauge core-cutting needle. A  coil-shaped clip was then deployed to morgan the lesion.     Postbiopsy unilateral digital mammogram of the right breast showed the  clip to be appropriately placed.  The patient tolerated the procedure  without difficulty and there was no immediate complication. Less than  5cc blood loss.  No pain following biopsy.      Impression    IMPRESSION: Successful right breast ultrasound-guided core biopsy and  clip placement.  Final pathology is pending.      JAMIN ALFONSO MD         SYSTEM ID:  [id number]   MR Breast Bilateral w/o & w Contrast    Narrative    EXAM: Bilateral breast MRI with and without contrast, and computer  aided kinetic analysis.     HISTORY/INDICATION: New diagnosis of right breast IDC detected as a  palpable lump    COMPARISON: Diagnostic  mammogram and ultrasound with subsequent biopsy  5/21/2024    TECHNIQUE: Multiplanar, multisequence imaging performed prior to  contrast administration and at multiple time points after contrast  administration. Post-processing including subtractions, MIPs and color  encoding of post contrast dynamic acquisitions.   IV contrast: 6.5 mL Gadavist  SEDATION: None    FINDINGS:  Breast composition: Heterogeneous fibroglandular tissue  Background parenchymal enhancement: Minimal    Right Breast: The biopsy-proven malignancy at 12:00 posterior depth  response to a solitary enhancing with washout kinetics measuring 1.3 x  0.9 x 1.1 cm cm (axial image 60/144, sagittal 76/100). No underlying  chest wall involvement. A thin plane of normal fat is preserved deep  to the mass. Indeterminate 0.6 x 0.4 x 0.4 cm mass at 9:30 o'clock  posterior depth (axial post contrast 80/144, sagittal 88/100). There  are multiple foci of background enhancement in the right breast, which  are more numerous than in the contralateral breast.  Right Axilla: No lymphadenopathy.   Right Internal Mammary Chain: No lymphadenopathy.     Left Breast: No concerning areas of enhancement.  Multiple foci of  background enhancement.  Left Axilla: No lymphadenopathy.   Left Internal Mammary Chain: No lymphadenopathy.         Impression    IMPRESSION:   1. Biopsy-proven invasive ductal carcinoma in the right breast  corresponds to a 1.3 cm enhancing mass at 12:00 posterior depth.  2. Additional indeterminate 0.6 cm mass in the right breast at 9:30  o'clock posterior depth for which second look ultrasound and possible  ultrasound-guided biopsy is recommend. This would be a difficult MRI  guided biopsy due to its posterior position.  3. No axillary or internal mammary chain lymphadenopathy.  4. Nothing for malignancy in the left breast.      BI-RADS CATEGORY: 4 - Suspicious Abnormality-Biopsy Should Be  Considered.       RECOMMENDATION:    PATRICIO HAIRSTON MD          SYSTEM ID:  [id number]   US Breast F/U MRI Right    Narrative    EXAM: US BREAST F/U MRI RIGHT LIMITED 1-3 QUADRANTS, 6/3/2024 12:26 PM    HISTORY: New diagnosis of right breast IDC presenting for workup of a  secondary mass seen on MRI.    COMPARISON: Breast MRI 5/29/2024    FINDINGS: Targeted ultrasound shows a subtle hypoechoic shadowing mass  at 10:00 9 cm from the nipple measuring 0.7 x 0.3 x 0.4 cm, in the  vicinity of the MR detected mass.      Impression    IMPRESSION: Indeterminate right breast mass corresponding to the  location of the MR finding. Ultrasound-guided biopsy was recommended  and performed, and will be dictated separately    BI-RADS CATEGORY: 4 - Suspicious.       RECOMMENDED FOLLOW-UP: Biopsy.     PATRICIO HAIRSTON MD         SYSTEM ID:  [id number]   US Breast Biopsy Core Needle Right    Narrative    ULTRASOUND-GUIDED RIGHT BREAST CORE BIOPSY;   CLIP PLACEMENT;   POSTPROCEDURE DIGITAL MAMMOGRAM; 6/3/2024 2:00 PM    INDICATION FOR PROCEDURE: MR detected mass    PROCEDURE:     Target:  Indistinct mass at 10:00 9 cm from the nipple   Clip: Hydromark T4   Proximity of clip to target: On target.  Only seen on XCCL image due  to posterior lateral position.  Number of samples: 3    Written informed consent was obtained from the patient prior to the  procedure. The risks and benefits were discussed.  Lidocaine was  infiltrated for local anesthetic.  Samples were obtained with a  14-gauge core-cutting needle via a 13 gauge introducer. A clip was  then deployed to morgan the lesion.     Postbiopsy unilateral digital mammogram was obtained.  The patient  tolerated the procedure without difficulty and there was no immediate  complication. Less than 5cc blood loss.  No pain following biopsy.      Impression    IMPRESSION: Successful ultrasound-guided core biopsy of the breast.   Final pathology is pending.      PATRICIO HAIRSTON MD         SYSTEM ID:  [id number]   MA Post Procedure Right    Narrative     ULTRASOUND-GUIDED RIGHT BREAST CORE BIOPSY;   CLIP PLACEMENT;   POSTPROCEDURE DIGITAL MAMMOGRAM; 6/3/2024 2:00 PM    INDICATION FOR PROCEDURE: MR detected mass    PROCEDURE:     Target:  Indistinct mass at 10:00 9 cm from the nipple   Clip: Hydromark T4   Proximity of clip to target: On target.  Only seen on XCCL image due  to posterior lateral position.  Number of samples: 3    Written informed consent was obtained from the patient prior to the  procedure. The risks and benefits were discussed.  Lidocaine was  infiltrated for local anesthetic.  Samples were obtained with a  14-gauge core-cutting needle via a 13 gauge introducer. A clip was  then deployed to morgan the lesion.     Postbiopsy unilateral digital mammogram was obtained.  The patient  tolerated the procedure without difficulty and there was no immediate  complication. Less than 5cc blood loss.  No pain following biopsy.      Impression    IMPRESSION: Successful ultrasound-guided core biopsy of the breast.   Final pathology is pending.      PATRICIO HAIRSTON MD         SYSTEM ID:  [id number]       Percutaneous core needle biopsy:   Final Diagnosis   A. Breast, right, 12:00, 6 cm from nipple (1.6 cm mass), ultrasound-guided needle core biopsy:  -Invasive ductal carcinoma, Elizabeth grade 2 of 3 (tubule formation: 3, nuclear grade: 2, mitoses: 1), 7 mm in greatest size.  -See comment.   Addendum electronically signed by Gerri Anaya MD on 5/23/2024 at 11:08 AM  Electronically signed by Gerri Anaya MD on 5/22/2024 at 10:15 AM   Comment    Hormone receptors, HER2, and Ki-67 immunohistochemical stains are ordered and will be reported in an addendum.  The combined Daren grade on the resection specimen may differ from the combined Daren grade on the core biopsy due to sampling limitations.  Clinical correlation is recommended.  This case received the opinion of a second pathologist with consensus on May 22, 2024.   Synoptic Checklist   Breast  Biomarker Reporting Template   Protocol posted: 3/22/2023(Added in Addendum) BREAST: BIOMARKER REPORTING TEMPLATE - A  Test(s) Performed     Estrogen Receptor (ER) Status  Positive (greater than 10% of cells demonstrate nuclear positivity)   Percentage of Cells with Nuclear Positivity  %   Average Intensity of Staining  Strong   Test Type  Food and Drug Administration (FDA) cleared (test / vendor): [place]   Primary Antibody  SP1   Scoring System  No separate scoring system used   Test(s) Performed     Progesterone Receptor (PgR) Status  Positive   Percentage of Cells with Nuclear Positivity  %   Average Intensity of Staining  Strong   Test Type  Food and Drug Administration (FDA) cleared (test / vendor): [place]   Primary Antibody  1E2   Scoring System  No separate scoring system used   Test(s) Performed     HER2 by Immunohistochemistry  Positive (Score 3+)   Percentage of Cells with Uniform Intense Complete Membrane Staining  95 %   Test Type  Food and Drug Administration (FDA) cleared (test / vendor): [place]   Primary Antibody  4B5   Test(s) Performed  Ki-67   Ki-67 Percentage of Positive Nuclei  15 %   Primary Antibody  30-9   Cold Ischemia and Fixation Times  Meet requirements specified in latest version of the ASCO / CAP Guidelines   Cold Ischemia Time (minutes)  3 min   Fixation Time (hours)  14.44 hours   Testing Performed on Block Number(s)  A1   METHODS   Fixative  Formalin   Image Analysis  Not performed   Comment(s)  Ki-67 immunohistochemistry is performed using the 30-9 antibody. It is not intended to be a marker that is predictive of response to specific systemic therapies.   .            Past Medical History:  Past Medical History:   Diagnosis Date    Hypothyroidism     Kidney stone 11/2018    Seizures (H)        Current Outpatient Medications   Medication Sig Dispense Refill    BINAXNOW COVID-19 AG HOME TEST KIT TEST AS DIRECTED TODAY      levETIRAcetam (KEPPRA) 500 MG tablet Take 1  "tablet (500 mg) by mouth daily 90 tablet 3    levETIRAcetam (KEPPRA) 500 MG tablet Take 1 tablet (500 mg) by mouth daily 3 tablet 0    levonorgestrel-ethinyl estradiol (AVIANE) 0.1-20 MG-MCG tablet Take 1 tablet by mouth daily 84 tablet 0    levothyroxine (SYNTHROID/LEVOTHROID) 137 MCG tablet Take 1 tablet (137 mcg) by mouth daily 90 tablet 1    Multiple Vitamin (ONE-A-DAY ESSENTIAL) TABS Take 1 tablet by mouth daily      sertraline (ZOLOFT) 50 MG tablet Take 1 tablet (50 mg) by mouth daily 90 tablet 0    valACYclovir (VALTREX) 1000 mg tablet Take 2 tablets (2,000 mg) by mouth 2 times daily 4 tablet 3     No current facility-administered medications for this visit.       Past Surgical History:  No past surgical history on file.     Social History:  Social History     Tobacco Use    Smoking status: Never    Smokeless tobacco: Never   Vaping Use    Vaping status: Never Used   Substance Use Topics    Alcohol use: Yes     Comment: occasionally    Drug use: No         ROS:  The 10 point review of systems is negative other than noted in the HPI and above.      PE:  Vitals: /71   Pulse 66   Resp 16   Ht 1.698 m (5' 6.85\")   Wt 64.9 kg (143 lb)   LMP 04/09/2024   SpO2 99%   BMI 22.50 kg/m    General appearance: well-nourished, sitting comfortably, no apparent distress  HEENT:  Head normocephalic and atraumatic, pupils equal and round, conjunctivae clear, mucous membranes moist, external ears and nose normal  Neck: Supple without thyromegaly or masses  Lungs: Respirations unlabored  Musculoskeletal:  Normal station and gait, extremities without edema  Neurologic: alert, speech is clear, moves all extremities with good strength  Psychiatric: Mood and affect are appropriate  Skin: Without lesions, rashes, or jaundice  Breast:    Symmetrical with no skin or nipple changes.     Contour is normal.   Parenchyma is moderately dense.   Masses- right, 12 o'clock - 1 cm diameter   Incisional scar- none    Lymph:       No " supraclavicular/infraclavicular adenopathy.  Small mobile lymph nodes laterally   Axillary adenopathy: none      This note may have been created using voice recognition software. Undetected word substitutions or other errors may have occurred.     61 minutes spent on the date of the encounter doing chart review, history and exam, documentation and further activities as noted above      Bindu Patel MD  06/04/24 9:37 AM     Please route or send letter to:  Referring Provider

## 2024-06-05 DIAGNOSIS — C50.911 INVASIVE DUCTAL CARCINOMA OF RIGHT BREAST, STAGE 1 (H): Primary | ICD-10-CM

## 2024-06-05 LAB
PATH REPORT.COMMENTS IMP SPEC: NORMAL
PATH REPORT.FINAL DX SPEC: NORMAL
PATH REPORT.GROSS SPEC: NORMAL
PATH REPORT.MICROSCOPIC SPEC OTHER STN: NORMAL
PATH REPORT.RELEVANT HX SPEC: NORMAL
PHOTO IMAGE: NORMAL

## 2024-06-05 NOTE — PROGRESS NOTES
Breast radiologist, Dr. Farmer, is recommending repeat breast MRI in 6 months to follow-up on second breast lesion that was biopsied as benign. Writer will route recommendation to Dr. Mariee. Update sent to Jenni via [employer].    Maureen Casanova, RN, BSN, OCN, CBCN  Nurse Care Coordinator  [facility] [place]  P: [phone]     F: [phone]

## 2024-06-05 NOTE — PROGRESS NOTES
Pathology report reviewed with our breast radiologist Dr Farmer, who confirmed the recent breast imaging is concordant with the final pathology results below.    Patient was called by Maureen REID with Dr Mariee and given results of Multiple fragments of benign breast tissue. And given recommendations to follow up in 6 month with a follow up MRI.      Patient states no problems or concerns with her biopsy site.   Questions were answered and my phone number given if she has further questions or concerns.  I informed patient I will notify the ordering provider of the results and recommendations for follow up.  Patient verbalized understanding and agrees with the plan of care.     Jelly Stewart RN CBCN  Breast Care Nurse Coordinator  [facility]  [phone]

## 2024-06-05 NOTE — PROGRESS NOTES
Jneni's second biopsy resulted benign. Writer outreached Jenni with this information. She is feeling relieved. She will connect with Dr. Patel's team for surgery scheduling.    JEANETTE Reaves at [facility] will confirm biopsy results with radiologist as well given the below comment.        Maureen Casanova RN, BSN, OCN, CBCN  Nurse Care Coordinator  [facility] -- [place]  P: [phone]     F: [phone]

## 2024-06-05 NOTE — PROGRESS NOTES
New Patient Oncology Nurse Navigator Note     Referring provider: Dr Jonathon Mariee     Referring Clinic/Organization: [facility]     Referred to (specialty:) Genetic Counseling      Date Referral Received: June 4, 2024     Evaluation for:  C50.111, Z17.0 (ICD-10-CM) - Malignant neoplasm of central portion of right breast in female, estrogen receptor positive (H)     Clinical History (per Nurse review of records provided):      Family history:  Personal history of breast cancer  Paternal grandmother with breast cancer at an unknown age  No history of ovarian or colon cancer  Paternal grandfather with prostate cancer

## 2024-06-06 ENCOUNTER — TELEPHONE (OUTPATIENT)
Dept: SURGERY | Facility: CLINIC | Age: 54
End: 2024-06-06
Payer: COMMERCIAL

## 2024-06-06 DIAGNOSIS — C50.911 INVASIVE DUCTAL CARCINOMA OF RIGHT BREAST, STAGE 1 (H): Primary | ICD-10-CM

## 2024-06-06 NOTE — TELEPHONE ENCOUNTER
Type of surgery: RIGHT BREAST LUMPECTOMY, RIGHT SENTINEL NODE BIOPSY   Location of surgery: [facility]  Date and time of surgery: 6/24/24  Surgeon: ANN   Pre-Op Appt Date: PATIENT TO SCHEDULE    Post-Op Appt Date: PATIENT TO SCHEDULE     Packet sent out: Yes  Pre-cert/Authorization completed:  Not Applicable  Date: 6/6/24        RIGHT BREAST LUMPECTOMY, RIGHT SENTINEL NODE BIOPSY GENERAL PT INST TO HAVE H&P WITH PCP 90 MIN REQ PA ASSIST ANN NMS SN INJ @ 10:30 AM ALBERT

## 2024-06-13 ENCOUNTER — TELEPHONE (OUTPATIENT)
Dept: MAMMOGRAPHY | Facility: CLINIC | Age: 54
End: 2024-06-13
Payer: COMMERCIAL

## 2024-06-19 ENCOUNTER — OFFICE VISIT (OUTPATIENT)
Dept: INTERNAL MEDICINE | Facility: CLINIC | Age: 54
End: 2024-06-19
Payer: COMMERCIAL

## 2024-06-19 VITALS
WEIGHT: 143 LBS | SYSTOLIC BLOOD PRESSURE: 104 MMHG | BODY MASS INDEX: 22.5 KG/M2 | DIASTOLIC BLOOD PRESSURE: 68 MMHG | RESPIRATION RATE: 14 BRPM | TEMPERATURE: 98 F | OXYGEN SATURATION: 99 % | HEART RATE: 60 BPM

## 2024-06-19 DIAGNOSIS — Z01.818 PREOP GENERAL PHYSICAL EXAM: Primary | ICD-10-CM

## 2024-06-19 DIAGNOSIS — R56.9 PARTIAL SEIZURES (H): ICD-10-CM

## 2024-06-19 DIAGNOSIS — F34.1 DYSTHYMIC DISORDER: ICD-10-CM

## 2024-06-19 DIAGNOSIS — C50.911 INFILTRATING DUCTAL CARCINOMA OF RIGHT BREAST (H): ICD-10-CM

## 2024-06-19 DIAGNOSIS — E03.9 ACQUIRED HYPOTHYROIDISM: ICD-10-CM

## 2024-06-19 LAB
ANION GAP SERPL CALCULATED.3IONS-SCNC: 8 MMOL/L (ref 7–15)
BUN SERPL-MCNC: 8.5 MG/DL (ref 6–20)
CALCIUM SERPL-MCNC: 9.1 MG/DL (ref 8.6–10)
CHLORIDE SERPL-SCNC: 105 MMOL/L (ref 98–107)
CREAT SERPL-MCNC: 0.89 MG/DL (ref 0.51–0.95)
DEPRECATED HCO3 PLAS-SCNC: 27 MMOL/L (ref 22–29)
EGFRCR SERPLBLD CKD-EPI 2021: 78 ML/MIN/1.73M2
ERYTHROCYTE [DISTWIDTH] IN BLOOD BY AUTOMATED COUNT: 12.6 % (ref 10–15)
GLUCOSE SERPL-MCNC: 93 MG/DL (ref 70–99)
HCT VFR BLD AUTO: 41.1 % (ref 35–47)
HGB BLD-MCNC: 13.4 G/DL (ref 11.7–15.7)
MCH RBC QN AUTO: 30.9 PG (ref 26.5–33)
MCHC RBC AUTO-ENTMCNC: 32.6 G/DL (ref 31.5–36.5)
MCV RBC AUTO: 95 FL (ref 78–100)
PLATELET # BLD AUTO: 201 10E3/UL (ref 150–450)
POTASSIUM SERPL-SCNC: 3.9 MMOL/L (ref 3.4–5.3)
RBC # BLD AUTO: 4.34 10E6/UL (ref 3.8–5.2)
SODIUM SERPL-SCNC: 140 MMOL/L (ref 135–145)
WBC # BLD AUTO: 7.5 10E3/UL (ref 4–11)

## 2024-06-19 PROCEDURE — 36415 COLL VENOUS BLD VENIPUNCTURE: CPT | Performed by: INTERNAL MEDICINE

## 2024-06-19 PROCEDURE — 93000 ELECTROCARDIOGRAM COMPLETE: CPT | Performed by: INTERNAL MEDICINE

## 2024-06-19 PROCEDURE — 80048 BASIC METABOLIC PNL TOTAL CA: CPT | Performed by: INTERNAL MEDICINE

## 2024-06-19 PROCEDURE — 85027 COMPLETE CBC AUTOMATED: CPT | Performed by: INTERNAL MEDICINE

## 2024-06-19 PROCEDURE — 99214 OFFICE O/P EST MOD 30 MIN: CPT | Performed by: INTERNAL MEDICINE

## 2024-06-19 NOTE — PROGRESS NOTES
Preoperative Evaluation  [address] [address]  Phone: [phone]  Primary Provider: Jhonatan Wheatley MD  Pre-op Performing Provider: Luciano Keller MD  Jun 19, 2024 6/18/2024   Surgical Information   What procedure is being done? Lumpectomy   Facility or Hospital where procedure/surgery will be performed: [facility]   Who is doing the procedure / surgery? Dr Patel   Date of surgery / procedure: June 24   Time of surgery / procedure: 11:30   Where do you plan to recover after surgery? at home with family        Fax number for surgical facility: Note does not need to be faxed, will be available electronically in Epic.    Assessment & Plan     The proposed surgical procedure is considered LOW risk.    Preop general physical exam  To proceed as scheduled pending routine lab work  - CBC with platelets; Future  - EKG 12-lead complete w/read - Clinics  - Basic metabolic panel  (Ca, Cl, CO2, Creat, Gluc, K, Na, BUN); Future    Infiltrating ductal carcinoma of right breast (H)  Routine follow-up postoperative course per surgery as direct    Partial seizures (H)  Noted as a history and clinically stable on antiepileptic medication.  Patient advised early morning a.m. of procedure to dose medication.    Acquired hypothyroidism  Stable and continuing with recent medication as ordered, recent TSH within normal range    Dysthymic disorder  Stable noted historically.  Continuing with SSRI therapy         - No identified additional risk factors other than previously addressed         Recommendation  Approval given to proceed with proposed procedure, without further diagnostic evaluation.  Advised to stop all traditional anti-inflammatories, aspirin like products and herbal supplements 1 week prior to her surgical procedure.  Routine medications may be continued until the a.m. of procedure at which time she will hold medicines for  lesser antiepileptic medication.    Jenny Arteaga is a 53 year old, presenting for the following:  Pre-Op Exam    HPI related to upcoming procedure: RIGHT BREAST LUMPECTOMY, RIGHT SENTINEL NODE BIOPSY         6/18/2024   Pre-Op Questionnaire   Have you ever had a heart attack or stroke? No   Have you ever had surgery on your heart or blood vessels, such as a stent placement, a coronary artery bypass, or surgery on an artery in your head, neck, heart, or legs? No   Do you have chest pain with activity? No   Do you have a history of heart failure? No   Do you currently have a cold, bronchitis or symptoms of other infection? (!) UNKNOWN    Do you have a cough, shortness of breath, or wheezing? (!) UNKNOWN    Do you or anyone in your family have previous history of blood clots? No   Do you or does anyone in your family have a serious bleeding problem such as prolonged bleeding following surgeries or cuts? No   Have you ever had problems with anemia or been told to take iron pills? No   Have you had any abnormal blood loss such as black, tarry or bloody stools, or abnormal vaginal bleeding? No   Have you ever had a blood transfusion? No   Are you willing to have a blood transfusion if it is medically needed before, during, or after your surgery? Yes   Have you or any of your relatives ever had problems with anesthesia? No   Do you have sleep apnea, excessive snoring or daytime drowsiness? No   Do you have any artifical heart valves or other implanted medical devices like a pacemaker, defibrillator, or continuous glucose monitor? No   Do you have artificial joints? No   Are you allergic to latex? No        Health Care Directive  Patient does not have a Health Care Directive or Living Will:  none      Status of Chronic Conditions:  See problem list for active medical problems.  Problems all longstanding and stable, except as noted/documented.  See ROS for pertinent symptoms related to these conditions.    Patient  Active Problem List    Diagnosis Date Noted    Rotator cuff tendonitis, right 03/28/2024     Priority: Medium    Abnormal auditory perception 11/01/2011     Priority: Medium    Tinnitus 11/01/2011     Priority: Medium    Vitamin D deficiency 02/12/2010     Priority: Medium    Recurrent cold sores 12/10/2007     Priority: Medium    Dysthymic disorder 04/16/2007     Priority: Medium    Partial seizures (H) 04/16/2007     Priority: Medium     Formatting of this note might be different from the original.  Last seizure 2006 before starting medicine      Acquired hypothyroidism 04/04/2006     Priority: Medium    Allergic rhinitis 04/04/2006     Priority: Medium    Pain in joint 04/04/2006     Priority: Medium    Premenstrual tension syndrome 04/04/2006     Priority: Medium      Past Medical History:   Diagnosis Date    Hypothyroidism     Kidney stone 11/2018    Seizures (H)      Past Surgical History:   Procedure Laterality Date    COLONOSCOPY      WISDOM TOOTH EXTRACTION       Current Outpatient Medications   Medication Sig Dispense Refill    levETIRAcetam (KEPPRA) 500 MG tablet Take 1 tablet (500 mg) by mouth daily 90 tablet 3    levothyroxine (SYNTHROID/LEVOTHROID) 137 MCG tablet Take 1 tablet (137 mcg) by mouth daily 90 tablet 1    Multiple Vitamin (ONE-A-DAY ESSENTIAL) TABS Take 1 tablet by mouth daily      sertraline (ZOLOFT) 50 MG tablet Take 1 tablet (50 mg) by mouth daily 90 tablet 0    valACYclovir (VALTREX) 1000 mg tablet Take 2 tablets (2,000 mg) by mouth 2 times daily (Patient taking differently: Take 2,000 mg by mouth 2 times daily As need for cold sores) 4 tablet 3       No Known Allergies     Social History     Tobacco Use    Smoking status: Never    Smokeless tobacco: Never   Substance Use Topics    Alcohol use: Yes     Comment: occasionally       History   Drug Use No         Review of Systems  CONSTITUTIONAL: NEGATIVE for fever, chills, change in weight  EYES: NEGATIVE for vision changes or  "irritation  ENT/MOUTH: NEGATIVE for ear, mouth and throat problems  RESP: NEGATIVE for significant cough or SOB  CV: NEGATIVE for chest pain, palpitations or peripheral edema  GI: NEGATIVE for nausea, abdominal pain, heartburn, or change in bowel habits  : NEGATIVE for frequency, dysuria, or hematuria  Patient reports she is not pregnant and has not been sexually active.  MUSCULOSKELETAL: NEGATIVE for significant arthralgias or myalgia  NEURO: NEGATIVE for weakness, dizziness or paresthesias.  Patient reports she has been seizure-free since on medication.  HEME: NEGATIVE for bleeding problems  PSYCHIATRIC: NEGATIVE for changes in mood or affect    Objective    /68   Pulse 60   Temp 98  F (36.7  C) (Temporal)   Resp 14   Wt 64.9 kg (143 lb)   LMP 05/10/2024 (Exact Date)   SpO2 99%   BMI 22.50 kg/m     Estimated body mass index is 22.5 kg/m  as calculated from the following:    Height as of 6/4/24: 1.698 m (5' 6.85\").    Weight as of 6/4/24: 64.9 kg (143 lb).    Physical Exam  GENERAL: alert and no distress  EYES: Eyes grossly normal to inspection, PERRL and conjunctivae and sclerae normal  HENT: ear canals and TM's normal, nose and mouth without ulcers or lesions  RESP: lungs clear to auscultation - no rales, rhonchi or wheezes  CV: regular rate and rhythm, normal S1 S2, no S3 or S4, no murmur, click or rub, no peripheral edema  MS: no gross musculoskeletal defects noted, no edema  NEURO: No focal changes  PSYCH: mentation appears normal, affect normal/bright    Recent Labs   Lab Test 11/08/23  1113   HGB 12.3         POTASSIUM 4.7   CR 0.96*        Diagnostics  Labs pending at this time.  Results will be reviewed when available.   EKG demonstrates a normal sinus rhythm with a bradycardic rate of 58 bpm.  There is no old EKG for comparison.  Slight RSR prime pattern is noted in lead V1 with some mild left atrial enlargement but no further distinct acute changes are noted.    Revised " Cardiac Risk Index (RCRI)  The patient has the following serious cardiovascular risks for perioperative complications:   - No serious cardiac risks = 0 points     RCRI Interpretation: 0 points: Class I (very low risk - 0.4% complication rate)       Signed Electronically by: Luciano Keller MD  Copy of this evaluation report is provided to requesting physician, Dr. Patel

## 2024-06-24 ENCOUNTER — HOSPITAL ENCOUNTER (OUTPATIENT)
Dept: NUCLEAR MEDICINE | Facility: CLINIC | Age: 54
Setting detail: NUCLEAR MEDICINE
Discharge: HOME | End: 2024-06-24
Attending: SURGERY | Admitting: SURGERY
Payer: COMMERCIAL

## 2024-06-24 ENCOUNTER — ANESTHESIA EVENT (OUTPATIENT)
Dept: SURGERY | Facility: CLINIC | Age: 54
End: 2024-06-24
Payer: COMMERCIAL

## 2024-06-24 ENCOUNTER — ANESTHESIA (OUTPATIENT)
Dept: SURGERY | Facility: CLINIC | Age: 54
End: 2024-06-24
Payer: COMMERCIAL

## 2024-06-24 ENCOUNTER — HOSPITAL ENCOUNTER (OUTPATIENT)
Facility: CLINIC | Age: 54
Discharge: HOME | End: 2024-06-24
Attending: SURGERY | Admitting: SURGERY
Payer: COMMERCIAL

## 2024-06-24 ENCOUNTER — APPOINTMENT (OUTPATIENT)
Dept: SURGERY | Facility: PHYSICIAN GROUP | Age: 54
End: 2024-06-24
Payer: COMMERCIAL

## 2024-06-24 ENCOUNTER — MEDICAL CORRESPONDENCE (OUTPATIENT)
Dept: HEALTH INFORMATION MANAGEMENT | Facility: CLINIC | Age: 54
End: 2024-06-24

## 2024-06-24 ENCOUNTER — APPOINTMENT (OUTPATIENT)
Dept: GENERAL RADIOLOGY | Facility: CLINIC | Age: 54
End: 2024-06-24
Attending: SURGERY
Payer: COMMERCIAL

## 2024-06-24 VITALS
RESPIRATION RATE: 15 BRPM | BODY MASS INDEX: 22.34 KG/M2 | SYSTOLIC BLOOD PRESSURE: 108 MMHG | HEART RATE: 50 BPM | OXYGEN SATURATION: 97 % | TEMPERATURE: 97.2 F | DIASTOLIC BLOOD PRESSURE: 70 MMHG | WEIGHT: 142 LBS

## 2024-06-24 DIAGNOSIS — C50.911 INVASIVE DUCTAL CARCINOMA OF RIGHT BREAST, STAGE 1 (H): ICD-10-CM

## 2024-06-24 DIAGNOSIS — C50.911 INVASIVE DUCTAL CARCINOMA OF RIGHT BREAST, STAGE 1 (H): Primary | ICD-10-CM

## 2024-06-24 PROCEDURE — 38525 BIOPSY/REMOVAL LYMPH NODES: CPT | Mod: 51 | Performed by: SURGERY

## 2024-06-24 PROCEDURE — 38900 IO MAP OF SENT LYMPH NODE: CPT | Mod: RT | Performed by: SURGERY

## 2024-06-24 PROCEDURE — 88307 TISSUE EXAM BY PATHOLOGIST: CPT | Mod: 26 | Performed by: PATHOLOGY

## 2024-06-24 PROCEDURE — 250N000011 HC RX IP 250 OP 636: Performed by: NURSE ANESTHETIST, CERTIFIED REGISTERED

## 2024-06-24 PROCEDURE — 250N000009 HC RX 250: Performed by: SURGERY

## 2024-06-24 PROCEDURE — 250N000011 HC RX IP 250 OP 636: Mod: JZ | Performed by: SURGERY

## 2024-06-24 PROCEDURE — 710N000012 HC RECOVERY PHASE 2, PER MINUTE: Performed by: SURGERY

## 2024-06-24 PROCEDURE — 38792 RA TRACER ID OF SENTINL NODE: CPT

## 2024-06-24 PROCEDURE — 250N000011 HC RX IP 250 OP 636: Mod: JZ | Performed by: NURSE ANESTHETIST, CERTIFIED REGISTERED

## 2024-06-24 PROCEDURE — 258N000003 HC RX IP 258 OP 636: Mod: JZ | Performed by: NURSE ANESTHETIST, CERTIFIED REGISTERED

## 2024-06-24 PROCEDURE — 250N000025 HC SEVOFLURANE, PER MIN: Performed by: SURGERY

## 2024-06-24 PROCEDURE — 272N000001 HC OR GENERAL SUPPLY STERILE: Performed by: SURGERY

## 2024-06-24 PROCEDURE — 250N000013 HC RX MED GY IP 250 OP 250 PS 637: Performed by: ANESTHESIOLOGY

## 2024-06-24 PROCEDURE — 360N000082 HC SURGERY LEVEL 2 W/ FLUORO, PER MIN: Performed by: SURGERY

## 2024-06-24 PROCEDURE — 370N000017 HC ANESTHESIA TECHNICAL FEE, PER MIN: Performed by: SURGERY

## 2024-06-24 PROCEDURE — 999N000141 HC STATISTIC PRE-PROCEDURE NURSING ASSESSMENT: Performed by: SURGERY

## 2024-06-24 PROCEDURE — 36561 INSERT TUNNELED CV CATH: CPT | Mod: 51 | Performed by: SURGERY

## 2024-06-24 PROCEDURE — 343N000001 HC RX 343: Performed by: SURGERY

## 2024-06-24 PROCEDURE — A9520 TC99 TILMANOCEPT DIAG 0.5MCI: HCPCS | Performed by: SURGERY

## 2024-06-24 PROCEDURE — 88329 PATH CONSLTJ DRG SURG: CPT | Performed by: PATHOLOGY

## 2024-06-24 PROCEDURE — 710N000009 HC RECOVERY PHASE 1, LEVEL 1, PER MIN: Performed by: SURGERY

## 2024-06-24 PROCEDURE — 19301 PARTIAL MASTECTOMY: CPT | Mod: RT | Performed by: SURGERY

## 2024-06-24 PROCEDURE — 88342 IMHCHEM/IMCYTCHM 1ST ANTB: CPT | Mod: 26 | Performed by: PATHOLOGY

## 2024-06-24 PROCEDURE — 88307 TISSUE EXAM BY PATHOLOGIST: CPT | Mod: TC | Performed by: SURGERY

## 2024-06-24 PROCEDURE — 36561 INSERT TUNNELED CV CATH: CPT | Mod: AS | Performed by: PHYSICIAN ASSISTANT

## 2024-06-24 PROCEDURE — 19301 PARTIAL MASTECTOMY: CPT | Mod: AS | Performed by: PHYSICIAN ASSISTANT

## 2024-06-24 PROCEDURE — 999N000179 XR SURGERY CARM FLUORO LESS THAN 5 MIN W STILLS: Mod: TC

## 2024-06-24 PROCEDURE — 250N000009 HC RX 250: Performed by: NURSE ANESTHETIST, CERTIFIED REGISTERED

## 2024-06-24 PROCEDURE — C1788 PORT, INDWELLING, IMP: HCPCS | Performed by: SURGERY

## 2024-06-24 PROCEDURE — 258N000001 HC RX 258: Performed by: SURGERY

## 2024-06-24 PROCEDURE — 250N000013 HC RX MED GY IP 250 OP 250 PS 637: Performed by: SURGERY

## 2024-06-24 PROCEDURE — 250N000011 HC RX IP 250 OP 636: Performed by: SURGERY

## 2024-06-24 PROCEDURE — 258N000003 HC RX IP 258 OP 636: Mod: JZ | Performed by: ANESTHESIOLOGY

## 2024-06-24 PROCEDURE — 250N000011 HC RX IP 250 OP 636: Mod: JZ | Performed by: ANESTHESIOLOGY

## 2024-06-24 DEVICE — CATH PORT SMART VORTEX LOW PROFILE 8FR CT80LPPDVI: Type: IMPLANTABLE DEVICE | Site: CHEST | Status: FUNCTIONAL

## 2024-06-24 RX ORDER — MAGNESIUM HYDROXIDE 1200 MG/15ML
LIQUID ORAL PRN
Status: DISCONTINUED | OUTPATIENT
Start: 2024-06-24 | End: 2024-06-24 | Stop reason: HOSPADM

## 2024-06-24 RX ORDER — OXYCODONE HYDROCHLORIDE 5 MG/1
10 TABLET ORAL
Status: DISCONTINUED | OUTPATIENT
Start: 2024-06-24 | End: 2024-06-24 | Stop reason: HOSPADM

## 2024-06-24 RX ORDER — ONDANSETRON 2 MG/ML
INJECTION INTRAMUSCULAR; INTRAVENOUS PRN
Status: DISCONTINUED | OUTPATIENT
Start: 2024-06-24 | End: 2024-06-24

## 2024-06-24 RX ORDER — PROPOFOL 10 MG/ML
INJECTION, EMULSION INTRAVENOUS CONTINUOUS PRN
Status: DISCONTINUED | OUTPATIENT
Start: 2024-06-24 | End: 2024-06-24

## 2024-06-24 RX ORDER — FENTANYL CITRATE 50 UG/ML
INJECTION, SOLUTION INTRAMUSCULAR; INTRAVENOUS PRN
Status: DISCONTINUED | OUTPATIENT
Start: 2024-06-24 | End: 2024-06-24

## 2024-06-24 RX ORDER — FENTANYL CITRATE 50 UG/ML
50 INJECTION, SOLUTION INTRAMUSCULAR; INTRAVENOUS EVERY 5 MIN PRN
Status: DISCONTINUED | OUTPATIENT
Start: 2024-06-24 | End: 2024-06-24 | Stop reason: HOSPADM

## 2024-06-24 RX ORDER — DEXAMETHASONE SODIUM PHOSPHATE 4 MG/ML
4 INJECTION, SOLUTION INTRA-ARTICULAR; INTRALESIONAL; INTRAMUSCULAR; INTRAVENOUS; SOFT TISSUE
Status: DISCONTINUED | OUTPATIENT
Start: 2024-06-24 | End: 2024-06-24 | Stop reason: HOSPADM

## 2024-06-24 RX ORDER — CEFAZOLIN SODIUM/WATER 2 G/20 ML
2 SYRINGE (ML) INTRAVENOUS SEE ADMIN INSTRUCTIONS
Status: DISCONTINUED | OUTPATIENT
Start: 2024-06-24 | End: 2024-06-24 | Stop reason: HOSPADM

## 2024-06-24 RX ORDER — BUPIVACAINE HYDROCHLORIDE 2.5 MG/ML
INJECTION, SOLUTION EPIDURAL; INFILTRATION; INTRACAUDAL PRN
Status: DISCONTINUED | OUTPATIENT
Start: 2024-06-24 | End: 2024-06-24 | Stop reason: HOSPADM

## 2024-06-24 RX ORDER — CEFAZOLIN SODIUM/WATER 2 G/20 ML
2 SYRINGE (ML) INTRAVENOUS
Status: COMPLETED | OUTPATIENT
Start: 2024-06-24 | End: 2024-06-24

## 2024-06-24 RX ORDER — LIDOCAINE 40 MG/G
CREAM TOPICAL
Status: DISCONTINUED | OUTPATIENT
Start: 2024-06-24 | End: 2024-06-24 | Stop reason: HOSPADM

## 2024-06-24 RX ORDER — SODIUM CHLORIDE, SODIUM LACTATE, POTASSIUM CHLORIDE, CALCIUM CHLORIDE 600; 310; 30; 20 MG/100ML; MG/100ML; MG/100ML; MG/100ML
INJECTION, SOLUTION INTRAVENOUS CONTINUOUS PRN
Status: DISCONTINUED | OUTPATIENT
Start: 2024-06-24 | End: 2024-06-24

## 2024-06-24 RX ORDER — NALOXONE HYDROCHLORIDE 0.4 MG/ML
0.1 INJECTION, SOLUTION INTRAMUSCULAR; INTRAVENOUS; SUBCUTANEOUS
Status: DISCONTINUED | OUTPATIENT
Start: 2024-06-24 | End: 2024-06-24 | Stop reason: HOSPADM

## 2024-06-24 RX ORDER — HYDROMORPHONE HCL IN WATER/PF 6 MG/30 ML
0.2 PATIENT CONTROLLED ANALGESIA SYRINGE INTRAVENOUS EVERY 5 MIN PRN
Status: DISCONTINUED | OUTPATIENT
Start: 2024-06-24 | End: 2024-06-24 | Stop reason: HOSPADM

## 2024-06-24 RX ORDER — HEPARIN SODIUM (PORCINE) LOCK FLUSH IV SOLN 100 UNIT/ML 100 UNIT/ML
SOLUTION INTRAVENOUS PRN
Status: DISCONTINUED | OUTPATIENT
Start: 2024-06-24 | End: 2024-06-24 | Stop reason: HOSPADM

## 2024-06-24 RX ORDER — OXYCODONE HYDROCHLORIDE 5 MG/1
5 TABLET ORAL
Status: DISCONTINUED | OUTPATIENT
Start: 2024-06-24 | End: 2024-06-24 | Stop reason: HOSPADM

## 2024-06-24 RX ORDER — FENTANYL CITRATE 50 UG/ML
25 INJECTION, SOLUTION INTRAMUSCULAR; INTRAVENOUS EVERY 5 MIN PRN
Status: DISCONTINUED | OUTPATIENT
Start: 2024-06-24 | End: 2024-06-24 | Stop reason: HOSPADM

## 2024-06-24 RX ORDER — KETOROLAC TROMETHAMINE 30 MG/ML
INJECTION, SOLUTION INTRAMUSCULAR; INTRAVENOUS PRN
Status: DISCONTINUED | OUTPATIENT
Start: 2024-06-24 | End: 2024-06-24

## 2024-06-24 RX ORDER — DEXAMETHASONE SODIUM PHOSPHATE 4 MG/ML
INJECTION, SOLUTION INTRA-ARTICULAR; INTRALESIONAL; INTRAMUSCULAR; INTRAVENOUS; SOFT TISSUE PRN
Status: DISCONTINUED | OUTPATIENT
Start: 2024-06-24 | End: 2024-06-24

## 2024-06-24 RX ORDER — GLYCOPYRROLATE 0.2 MG/ML
INJECTION, SOLUTION INTRAMUSCULAR; INTRAVENOUS PRN
Status: DISCONTINUED | OUTPATIENT
Start: 2024-06-24 | End: 2024-06-24

## 2024-06-24 RX ORDER — ONDANSETRON 4 MG/1
4 TABLET, ORALLY DISINTEGRATING ORAL EVERY 30 MIN PRN
Status: DISCONTINUED | OUTPATIENT
Start: 2024-06-24 | End: 2024-06-24 | Stop reason: HOSPADM

## 2024-06-24 RX ORDER — HYDROMORPHONE HCL IN WATER/PF 6 MG/30 ML
0.4 PATIENT CONTROLLED ANALGESIA SYRINGE INTRAVENOUS EVERY 5 MIN PRN
Status: DISCONTINUED | OUTPATIENT
Start: 2024-06-24 | End: 2024-06-24 | Stop reason: HOSPADM

## 2024-06-24 RX ORDER — SODIUM CHLORIDE, SODIUM LACTATE, POTASSIUM CHLORIDE, CALCIUM CHLORIDE 600; 310; 30; 20 MG/100ML; MG/100ML; MG/100ML; MG/100ML
INJECTION, SOLUTION INTRAVENOUS CONTINUOUS
Status: DISCONTINUED | OUTPATIENT
Start: 2024-06-24 | End: 2024-06-24 | Stop reason: HOSPADM

## 2024-06-24 RX ORDER — ONDANSETRON 2 MG/ML
4 INJECTION INTRAMUSCULAR; INTRAVENOUS EVERY 30 MIN PRN
Status: DISCONTINUED | OUTPATIENT
Start: 2024-06-24 | End: 2024-06-24 | Stop reason: HOSPADM

## 2024-06-24 RX ORDER — OXYCODONE HYDROCHLORIDE 5 MG/1
5-10 TABLET ORAL EVERY 4 HOURS PRN
Qty: 6 TABLET | Refills: 0 | Status: SHIPPED | OUTPATIENT
Start: 2024-06-24 | End: 2024-07-23

## 2024-06-24 RX ORDER — ACETAMINOPHEN 325 MG/1
650 TABLET ORAL
Status: DISCONTINUED | OUTPATIENT
Start: 2024-06-24 | End: 2024-06-24 | Stop reason: HOSPADM

## 2024-06-24 RX ORDER — OXYCODONE HYDROCHLORIDE 5 MG/1
5 TABLET ORAL
Status: COMPLETED | OUTPATIENT
Start: 2024-06-24 | End: 2024-06-24

## 2024-06-24 RX ORDER — PROPOFOL 10 MG/ML
INJECTION, EMULSION INTRAVENOUS PRN
Status: DISCONTINUED | OUTPATIENT
Start: 2024-06-24 | End: 2024-06-24

## 2024-06-24 RX ORDER — ACETAMINOPHEN 325 MG/1
975 TABLET ORAL ONCE
Status: COMPLETED | OUTPATIENT
Start: 2024-06-24 | End: 2024-06-24

## 2024-06-24 RX ORDER — LIDOCAINE HYDROCHLORIDE 20 MG/ML
INJECTION, SOLUTION INFILTRATION; PERINEURAL PRN
Status: DISCONTINUED | OUTPATIENT
Start: 2024-06-24 | End: 2024-06-24

## 2024-06-24 RX ADMIN — KETOROLAC TROMETHAMINE 30 MG: 30 INJECTION, SOLUTION INTRAMUSCULAR at 14:39

## 2024-06-24 RX ADMIN — FENTANYL CITRATE 25 MCG: 50 INJECTION, SOLUTION INTRAMUSCULAR; INTRAVENOUS at 15:31

## 2024-06-24 RX ADMIN — PROPOFOL 75 MCG/KG/MIN: 10 INJECTION, EMULSION INTRAVENOUS at 12:38

## 2024-06-24 RX ADMIN — PROPOFOL 200 MG: 10 INJECTION, EMULSION INTRAVENOUS at 12:38

## 2024-06-24 RX ADMIN — HYDROMORPHONE HYDROCHLORIDE 0.2 MG: 0.2 INJECTION, SOLUTION INTRAMUSCULAR; INTRAVENOUS; SUBCUTANEOUS at 16:24

## 2024-06-24 RX ADMIN — SODIUM CHLORIDE, POTASSIUM CHLORIDE, SODIUM LACTATE AND CALCIUM CHLORIDE: 600; 310; 30; 20 INJECTION, SOLUTION INTRAVENOUS at 14:24

## 2024-06-24 RX ADMIN — LIDOCAINE HYDROCHLORIDE 30 MG: 20 INJECTION, SOLUTION INFILTRATION; PERINEURAL at 12:38

## 2024-06-24 RX ADMIN — ONDANSETRON 4 MG: 2 INJECTION INTRAMUSCULAR; INTRAVENOUS at 14:39

## 2024-06-24 RX ADMIN — HYDROMORPHONE HYDROCHLORIDE 0.4 MG: 0.2 INJECTION, SOLUTION INTRAMUSCULAR; INTRAVENOUS; SUBCUTANEOUS at 15:59

## 2024-06-24 RX ADMIN — PHENYLEPHRINE HYDROCHLORIDE 100 MCG: 10 INJECTION INTRAVENOUS at 12:56

## 2024-06-24 RX ADMIN — FENTANYL CITRATE 100 MCG: 50 INJECTION INTRAMUSCULAR; INTRAVENOUS at 12:38

## 2024-06-24 RX ADMIN — FENTANYL CITRATE 25 MCG: 50 INJECTION, SOLUTION INTRAMUSCULAR; INTRAVENOUS at 15:04

## 2024-06-24 RX ADMIN — MIDAZOLAM 2 MG: 1 INJECTION INTRAMUSCULAR; INTRAVENOUS at 12:35

## 2024-06-24 RX ADMIN — Medication 2 G: at 12:35

## 2024-06-24 RX ADMIN — PHENYLEPHRINE HYDROCHLORIDE 100 MCG: 10 INJECTION INTRAVENOUS at 12:47

## 2024-06-24 RX ADMIN — ACETAMINOPHEN 975 MG: 325 TABLET, FILM COATED ORAL at 10:38

## 2024-06-24 RX ADMIN — FENTANYL CITRATE 25 MCG: 50 INJECTION, SOLUTION INTRAMUSCULAR; INTRAVENOUS at 15:14

## 2024-06-24 RX ADMIN — ACETAMINOPHEN 650 MG: 325 TABLET, FILM COATED ORAL at 16:36

## 2024-06-24 RX ADMIN — TILMANOCEPT 0.54 MILLICURIE: KIT at 10:43

## 2024-06-24 RX ADMIN — GLYCOPYRROLATE 0.2 MG: 0.2 INJECTION, SOLUTION INTRAMUSCULAR; INTRAVENOUS at 12:47

## 2024-06-24 RX ADMIN — DEXAMETHASONE SODIUM PHOSPHATE 8 MG: 4 INJECTION, SOLUTION INTRA-ARTICULAR; INTRALESIONAL; INTRAMUSCULAR; INTRAVENOUS; SOFT TISSUE at 12:47

## 2024-06-24 RX ADMIN — HYDROMORPHONE HYDROCHLORIDE 0.2 MG: 0.2 INJECTION, SOLUTION INTRAMUSCULAR; INTRAVENOUS; SUBCUTANEOUS at 16:11

## 2024-06-24 RX ADMIN — PHENYLEPHRINE HYDROCHLORIDE 100 MCG: 10 INJECTION INTRAVENOUS at 13:10

## 2024-06-24 RX ADMIN — OXYCODONE HYDROCHLORIDE 5 MG: 5 TABLET ORAL at 15:42

## 2024-06-24 RX ADMIN — SODIUM CHLORIDE, POTASSIUM CHLORIDE, SODIUM LACTATE AND CALCIUM CHLORIDE: 600; 310; 30; 20 INJECTION, SOLUTION INTRAVENOUS at 12:14

## 2024-06-24 RX ADMIN — FENTANYL CITRATE 25 MCG: 50 INJECTION, SOLUTION INTRAMUSCULAR; INTRAVENOUS at 15:20

## 2024-06-24 RX ADMIN — SODIUM CHLORIDE, POTASSIUM CHLORIDE, SODIUM LACTATE AND CALCIUM CHLORIDE: 600; 310; 30; 20 INJECTION, SOLUTION INTRAVENOUS at 10:31

## 2024-06-24 ASSESSMENT — ACTIVITIES OF DAILY LIVING (ADL)
ADLS_ACUITY_SCORE: 31

## 2024-06-24 ASSESSMENT — ENCOUNTER SYMPTOMS: SEIZURES: 1

## 2024-06-24 NOTE — ANESTHESIA CARE TRANSFER NOTE
Patient: Jenni Torres    Procedure: Procedure(s):  RIGHT BREAST LUMPECTOMY, RIGHT SENTINEL NODE BIOPSY,  INSERTION VASCULAR ACCESS PORT       Diagnosis: Invasive ductal carcinoma of right breast, stage 1 (H) [C50.911]  Diagnosis Additional Information: No value filed.    Anesthesia Type:   General     Note:    Oropharynx: oropharynx clear of all foreign objects and spontaneously breathing  Level of Consciousness: awake  Oxygen Supplementation: face mask  Level of Supplemental Oxygen (L/min / FiO2): 6  Independent Airway: airway patency satisfactory and stable  Dentition: dentition unchanged  Vital Signs Stable: post-procedure vital signs reviewed and stable  Report to RN Given: handoff report given  Patient transferred to: PACU    Handoff Report: Identifed the Patient, Identified the Reponsible Provider, Reviewed the pertinent medical history, Discussed the surgical course, Reviewed Intra-OP anesthesia mangement and issues during anesthesia, Set expectations for post-procedure period and Allowed opportunity for questions and acknowledgement of understanding      Vitals:  Vitals Value Taken Time   /81 06/24/24 1453   Temp     Pulse 48 06/24/24 1455   Resp 16 06/24/24 1455   SpO2 100 % 06/24/24 1455   Vitals shown include unfiled device data.    Electronically Signed By: PIETRO Santana CRNA  June 24, 2024  2:56 PM

## 2024-06-24 NOTE — DISCHARGE INSTRUCTIONS
Maximum acetaminophen (Tylenol) dose from all sources should not exceed 4 grams (4000 mg) per day. You received 650 mg of Tylenol at 4:38 pm. Do not take any additional Tylenol until  8:38 PM or later.    You received Toradol, an IV form of Ibuprofen (Motrin) at 2:39 PM.  Do not take any Ibuprofen products until 8:39 PM.    Dr. Patel  [facility] [phone]       HOME CARE FOLLOWING LUMPECTOMY  POOL Montanez, KATEY Pisano C. Pratt, EDELMIRA Patel    APPOINTMENT WITH YOUR SURGEON:  All patients are to schedule a post-op appointment with their general surgeon.  Once you are home, call the office to schedule the appointment for 2-3 weeks from the date of your surgery.  You may need to be seen sooner if you have a drain in place.      Appointments to see your general surgeon at any of our locations can be made by calling:  #[phone]    You will receive a phone call from your surgeon with these results.  You will be able to ask questions and receive more in-depth explanation at your post-op appointment.  This appointment will also be when you discuss further evaluation, treatment, and referral to oncology and/or radiation oncology if this is necessary.  Occasionally special testing must be done on the surgical specimen which may delay the posting of your final pathology report.  You may call for your final pathology report after 1p.m. three working days after surgery to check on its status.    SUPPORT:  Wear a bra for support and comfort for 3-7 days, day and night.    INCISIONAL CARE:  If you have a dressing in place, keep clean and dry for 48 hours; you may replace the gauze if it becomes soiled.  After 48 hours you may remove the dressing and shower.  Do not submerse incision in water for 1 week.  If you have a Dermabond dressing (a type of skin glue), you may shower immediately.  Sutures will absorb and do not need to be removed.  If present, leave the steri-strips (white paper  tapes) in place for 14 days after surgery.  If present, leave Dermabond glue in place until it wears/flakes off.  You may expect a small amount of drainage from your incision.  A lump/ridge under the incision is normal and will gradually resolve.    BATHING:  You are allowed to bath (shallow water in a tub only) or shower 24-48 hours after your surgery.  Mild soap is OK to use near these sites.  When bathing, do not allow the incision or drain site to become submersed in water as this may increase the risk of infection.    ACTIVITY:  Cautiously resume exercise and strenuous activities such as jogging, tennis, aerobics, etc. Also, be careful of stretching activities with operative side for two weeks.    If you had a  sentinel node biopsy  at the time of your surgery:  You have no restrictions in addition to those noted above.    DIET:  No restrictions.  Increased fluid intake is recommended. While taking pain medications, increase dietary fiber or add a fiber supplementation like Metamucil or Citrucel to help prevent constipation - a possible side effect of pain medications.    DISCOMFORT:  Local anesthetic placed at surgery should provide relief for 4-8 hours.  Begin taking pain pills before discomfort is severe.  Take the pain medication with some food, when possible, to minimize side effects.  Intermittent use of ice packs may help during the first 48 hours.  Expect gradual improvement.  Recommend the following over the counter medications:  - Ibuprofen (motrin) 600mg every 6 hours (max 2,400mg per day)   - Tylenol (acetaminophen) 500-1000mg every 6 hours (max 4,000mg per day)  - Take with food if GI upset occurs  - If additional pain medication is needed, take the narcotic that you were prescribed.      RETURN APPOINTMENT:  Schedule a follow-up visit 2-3 weeks post-op.  Office Phone:  [phone]     CONTACT US IF THE FOLLOWING DEVELOPS:   1. A fever that is above 101     2. If there is a large amount of  drainage, bleeding, or swelling.   3. Severe pain that is not relieved by your prescription.   4. Drainage that is thick, cloudy, yellow, green or white.   5. Any other questions not answered by  Frequently Asked Questions  sheet.      FREQUENTLY ASKED QUESTIONS:    Q:  How should my incision look?    A:  Normally your incision will appear slightly swollen with light redness directly along the incision itself as it heals.  It may feel like a bump or ridge as the healing/scarring happens, and over time (3-4 months) this bump or ridge feeling should slowly go away.  In general, clear or pink watery drainage can be normal at first as your incision heals, but should decrease over time.    Q:  How do I know if my incision is infected?  A:  Look at your incision for signs of infection, like redness around the incision spreading to surrounding skin, or drainage of cloudy or foul-smelling drainage.  If you feel warm, check your temperature to see if you are running a fever.    **If any of these things occur, please notify the nurse at our office.  We may need you to come into the office for an incision check.      Q:  How do I take care of my incision?  A:  If you have a dressing in place - Starting the day after surgery, replace the dressing 1-2 times a day until there is no further drainage from the incision.  At that time, a dressing is no longer needed.  Try to minimize tape on the skin if irritation is occurring at the tape sites.  If you have significant irritation from tape on the skin, please call the office to discuss other method of dressing your incision.    Small pieces of tape called  steri-strips  may be present directly overlying your incision; these may be removed 10 days after surgery unless otherwise specified by your surgeon.  If these tapes start to loosen at the ends, you may trim them back until they fall off or are removed.    A:  If you had  Dermabond  tissue glue used as a dressing (this causes your  incision to look shiny with a clear covering over it) - This type of dressing wears off with time and does not require more dressings over the top unless it is draining around the glue as it wears off.  Do not apply ointments or lotions over the incisions until the glue has completely worn off.    Q:  There is a piece of tape or a sticky  lead  still on my skin.  Can I remove this?  A:  Sometimes the sticky  leads  used for monitoring during surgery or for evaluation in the emergency department are not all removed while you are in the hospital.  These sometimes have a tab or metal dot on them.  You can easily remove these on your own, like taking off a band-aid.  If there is a gel substance under the  lead , simply wipe/clean it off with a washcloth or paper towel.      Q:  What can I do to minimize constipation (very hard stools, or lack of stools)?  A:  Stay well hydrated.  Increase your dietary fiber intake or take a fiber supplement -with plenty of water.  Walk around frequently.  You may consider an over-the-counter stool-softener.  Your Pharmacist can assist you with choosing one that is stocked at your pharmacy.  Constipation is also one of the most common side effects of pain medication.  If you are using pain medication, be pro-active and try to PREVENT problems with constipation by taking the steps above BEFORE constipation becomes a problem.    Q:  What do I do if I need more pain medications?  A:  Call the office to receive refills.  Be aware that certain pain meds cannot be called into a pharmacy and actually require a paper prescription.  A change may be made in your pain med as you progress thru your recovery period or if you have side effects to certain meds.    --Pain meds are NOT refilled after 5pm on weekdays, and NOT AT ALL on the weekends, so please look ahead to prevent problems.      Q:  Why am I having a hard time sleeping now that I am at home?  A:  Many medications you receive while you are  in the hospital can impact your sleep for a number of days after your surgery/hospitalization.  Decreased level of activity and naps during the day may also make sleeping at night difficult.  Try to minimize day-time naps, and get up frequently during the day to walk around your home during your recovery time.  Sleep aides may be of some help, but are not recommended for long-term use.      Q:  I am having some back discomfort.  What should I do?  A:  This may be related to certain positioning that was required for your surgery, extended periods of time in bed, or other changes in your overall activity level.  You may try ice, heat, acetaminophen, or ibuprofen to treat this temporarily.  Note that many pain medications have acetaminophen in them and would state this on the prescription bottle.  Be sure not to exceed the maximum of 4000mg per day of acetaminophen.     **If the pain you are having does not resolve, is severe, or is a flare of back pain you have had on other occasions prior to surgery, please contact your primary physician for further recommendations or for an appointment to be examined at their office.    Q:  Why am I having headaches?  A:  Headaches can be caused by many things:  caffeine withdrawal, use of pain meds, dehydration, high blood pressure, lack of sleep, over-activity/exhaustion, flare-up of usual migraine headaches.  If you feel this is related to muscle tension (a band-like feeling around the head, or a pressure at the low-back of the head) you may try ice or heat to this area.  You may need to drink more fluids (try electrolyte drink like Gatorade), rest, or take your usual migraine medications.   **If your headaches do not resolve, worsen, are accompanied by other symptoms, or if your blood pressure is high, please call your primary physician for recommendation and/or examination.    Q:  I am unable to urinate.  What do I do?  A:  A small percentage of people can have difficulty  urinating initially after surgery.  This includes being able to urinate only a very small amount at a time and feeling discomfort or pressure in the very low abdomen.  This is called  urinary retention , and is actually an urgent situation.  Proceed to your nearest Emergency department for evaluation (not an Urgent Care Center).  Sometimes the bladder does not work correctly after certain medications you receive during surgery, or related to certain procedures.  You may need to have a catheter placed until your bladder recovers.  When planning to go to an Emergency department, it may help to call the ER to let them know you are coming in for this problem after a surgery.  This may help you get in quicker to be evaluated.  **If you have symptoms of a urinary tract infection, please contact your primary physician for the proper evaluation and treatment.          If you have other questions, please call the office Monday thru Friday between 8am and 4:30pm to discuss with the nurse or physician assistant.  #[phone]    There is a surgeon ON CALL on weekday evenings and over the weekend in case of urgent need only, and may be contacted at the same number.    If you are having an emergency, call 911 or proceed to your nearest emergency department.

## 2024-06-24 NOTE — OP NOTE
General Surgery Operative Note      Pre-operative diagnosis: Invasive ductal carcinoma of right breast, stage 1 (H) [C50.911]   Post-operative diagnosis: Same=   Procedure: Right breast lumpectomy  Right axillary sentinel lymph node biopsy  Left internal jugular port placement   Surgeon: Bindu Patel MD   Assistant(s): Maureen Hall PA-C  The Physician Assistant was medically necessary for their expertise in prepping, suctioning, suturing and retraction.   Anesthesia: general    Estimated blood loss:  Complications: 20 cc  none   Specimens: Right breast lumpectomy  Right axillary sentinel node #1  Right axillary lymphatic channel, add on to sentinel node #1  Right axillary sentinel node #2  Right breast lumpectomy new medial margin  Right breast lumpectomy new lateral margin         Indications for operation: This is a 53 year old female who presented with triple positive right breast cancer. Given the small size we had multidisciplinary discussion with medical oncology and elected to proceed with upfront surgery; she opted to proceed with lumpectomy with sentinel node biopsy. She also elected for port placement at the same time for planned Her2 directed adjuvant chemotherapy.    Description of procedure:   After induction of anesthesia, the right arm, breast, and chest were prepped and draped in standard fashion.   The lumpectomy was performed by creating an incision over the breast in the 12 o'clock position. Skin flaps were raised circumferentially. The mass could be palpated just below the anterior breast tissue. The mass and surrounding tissue were excised . The specimen was inked for margins on the back table. The lumpectomy specimen was then sent to pathology for gross evaluation.  SLN: Prior to prep, I had injected methylene blue dye into the tissue below the areola. Using a hand-held gamma probe, a hot spot was identified in the axilla. An incision was created directly overlying this area. Dissection  was carried through the clavipectoral fascia. A blue lymphatic channel was followed to a blue node. The sentinel node was identified by its blue color and high radioactivity counts and excised from the superiomedial axilla using electrocautery. The highest remaining signal in the axilla was the blue lymphatic channel. This was excised and also sent to pathology as a separate specimen as the node had already been sent off. A total of 1 additional Level I axillary sentinel nodes were removed and submitted to pathology for permanent evaluation. The highest node, ex vivo, had a radioactivity count of 1313. The other node(s) had counts of 6. The axilla then had radioactivity counts less than 10% of the sentinel node.   Pathology had then reviewed the gross specimen for margins which showed 1mm to lateral and medial margins. Additional of each of these margin taken and sent to pathology for permanent evaluation. They reported 2mm to the anterior margin but we were right under skin and the anterior breast tissue had  off the tumor during manipulation so the margin was likely quite a bit larger. 0.5% marcaine plain was injected along the incision and subcutaneous tissue. Clips were placed in the 4 quadrants of the lumpectomy cavity.  Hemostasis was achieved with cautery.  The incisions were anesthetized with 0.5% marcaine. Both wounds were closed with a 3-0 interrupted deep and 4-0 Vicryl subcuticular closure in a running fashion.   Dermabond was applied.   We then tucked both arms and re-prepped and draped for the port placement.  The  left  internal  jugular  vein  was  assessed.    This  was  found  to  be  large  and  easily  compressible.    After making a small nick with an 11 blade, an  introducer  needle  was  inserted  into  the  internal  jugular  vein  using  ultrasound  guidance.    Entry  into  the  vein  was  confirmed  by  the  presence  of  dark,  nonpulsatile  blood.    A  guide  wire  was   advanced  through  the needle, and the  introducer  needle  was  removed,  leaving  the  wire  in  place.    Fluoroscopy  was  brought  onto  the  field  and  used  to  confirm  the  passage  of  the  wire  through  the  superior  vena  cava into the inferior vena cava.            The  proposed  pocket  site  in  the  upper  chest  was  infiltrated  with  local  anesthetic,  as  was  the  proposed  tunnel  path.    A  transverse  skin  incision  was  made  with  a  skin  knife.    This  was  taken  down  into  the  subcutaneous  adipose  to  the  level  of  the  fascia  with  electrocautery.    The  pocket  was  then  created  inferior  to  the  incision  with  electrocautery  and  blunt  dissection.    The  Port-A-Cath  catheter  was  brought  onto  the  field  and  passed  from  the  pocket  to  the  insertion  site  with  a  tunneling  trochar.    Fluoroscopy  was  used  to  visualize  passage  of  the  dilator-insertion  sheath  sytem  over  the  wire  and  into  the  superior  vena  cava.    The  dilator  and  wire  were  removed,  leaving  the  split-sheath  in  place.    The  catheter  was  passed  through  the  sheath  and,  using  fluoroscopic  guidance,  positioned  in  the  lower  superior  vena  cava.    The  catheter  was  tested  and  was  found  to  aspirate  and  flush  easily.          The  external  end  of  the  catheter  was  attached  to  the  port.    The  sytem  was  tested  for  patency  and  found  to  be  patent.    It  was  locked  with  heparinized  saline.    The  port  was  secured  in  place  with  two  2-0  vicryl  stitches  fastening  the  anchoring  sites  to  the  pectoralis  fascia.    The  pocket  incision  was  closed  with  3-0  Vicryl  subdermal  stitches  and  a  running  4-0  monocryl  subcuticular  stitch.    The  neck  stab  incision  was  closed  with  a  single  interrupted  4-0 monocryl subcuticular stitch.    The  skin  was  cleaned  and  dried,  and  the  incisions   were  dressed  with  dermabond.    The  patient  was  then  roused  and  transferred  to  the  Post  Anesthesia  Care  Unit  in  stable  condition.  There were no complications. Sponge  and  needle  counts  were  correct  on  two  occasions  prior  to  the  completion  of  the  procedure.          [place] Node Biopsy for Breast Cancer - Right  Operation performed with curative intent Yes   Tracer(s) used to identify sentinel nodes in the upfront surgery (non-neoadjuvant) setting Dye and Radioactive tracer   Tracer(s) used to identify sentinel nodes in the neoadjuvant setting N/A   All nodes (colored or non-colored) present at the end of a dye-filled lymphatic channel were removed Yes   All significantly radioactive nodes were removed Yes   All palpably suspicious nodes were removed N/A   Biopsy-proven positive nodes marked with clips prior to chemotherapy were identified and removed N/A           Bindu Patel MD

## 2024-06-24 NOTE — ANESTHESIA PREPROCEDURE EVALUATION
Anesthesia Pre-Procedure Evaluation    Patient: Jenni Torres   MRN: [id number] : 1970        Procedure : Procedure(s):  RIGHT BREAST LUMPECTOMY, RIGHT SENTINEL NODE BIOPSY,  INSERTION VASCULAR ACCESS PORT          Past Medical History:   Diagnosis Date    Hypothyroidism     Kidney stone 2018    Seizures (H)       Past Surgical History:   Procedure Laterality Date    BIOPSY      Breast    COLONOSCOPY      WISDOM TOOTH EXTRACTION        No Known Allergies   Social History     Tobacco Use    Smoking status: Never    Smokeless tobacco: Never   Substance Use Topics    Alcohol use: Yes     Comment: occasionally      Wt Readings from Last 1 Encounters:   24 64.4 kg (142 lb)        Anesthesia Evaluation   Pt has had prior anesthetic.     No history of anesthetic complications       ROS/MED HX  ENT/Pulmonary:       Neurologic:     (+)       seizures,                         Cardiovascular:  - neg cardiovascular ROS     METS/Exercise Tolerance:     Hematologic:  - neg hematologic  ROS     Musculoskeletal:  - neg musculoskeletal ROS     GI/Hepatic:  - neg GI/hepatic ROS     Renal/Genitourinary:     (+)       Nephrolithiasis ,       Endo:     (+)          thyroid problem, hypothyroidism,           Psychiatric/Substance Use:       Infectious Disease:       Malignancy:       Other:            Physical Exam    Airway        Mallampati: II   TM distance: > 3 FB   Neck ROM: full   Mouth opening: > 3 cm    Respiratory Devices and Support         Dental       (+) Minor Abnormalities - some fillings, tiny chips      Cardiovascular   cardiovascular exam normal          Pulmonary   pulmonary exam normal                OUTSIDE LABS:  CBC:   Lab Results   Component Value Date    WBC 7.5 2024    WBC 7.5 2023    HGB 13.4 2024    HGB 12.3 2023    HCT 41.1 2024    HCT 38.8 2023     2024     2023     BMP:   Lab Results   Component Value Date     2024  "    11/08/2023    POTASSIUM 3.9 06/19/2024    POTASSIUM 4.7 11/08/2023    CHLORIDE 105 06/19/2024    CHLORIDE 105 11/08/2023    CO2 27 06/19/2024    CO2 25 11/08/2023    BUN 8.5 06/19/2024    BUN 15.5 11/08/2023    CR 0.89 06/19/2024    CR 0.96 (H) 11/08/2023    GLC 93 06/19/2024    GLC 84 11/08/2023     COAGS: No results found for: \"PTT\", \"INR\", \"FIBR\"  POC: No results found for: \"BGM\", \"HCG\", \"HCGS\"  HEPATIC:   Lab Results   Component Value Date    ALBUMIN 4.1 11/08/2023    PROTTOTAL 7.1 11/08/2023    ALT 12 11/08/2023    AST 19 11/08/2023    ALKPHOS 39 11/08/2023    BILITOTAL 0.2 11/08/2023     OTHER:   Lab Results   Component Value Date    LEDA 9.1 06/19/2024    TSH 2.07 05/09/2024    SED 8 11/26/2023       Anesthesia Plan    ASA Status:  2       Anesthesia Type: General.     - Airway: LMA   Induction: Intravenous.   Maintenance: Balanced.        Consents    Anesthesia Plan(s) and associated risks, benefits, and realistic alternatives discussed. Questions answered and patient/representative(s) expressed understanding.     - Discussed:     - Discussed with:  Patient      - Extended Intubation/Ventilatory Support Discussed: No.      - Patient is DNR/DNI Status: No     Use of blood products discussed: No .     Postoperative Care    Pain management: IV analgesics, Oral pain medications, Multi-modal analgesia.   PONV prophylaxis: Ondansetron (or other 5HT-3), Dexamethasone or Solumedrol     Comments:               Dorian Alston MD    I have reviewed the pertinent notes and labs in the chart from the past 30 days and (re)examined the patient.  Any updates or changes from those notes are reflected in this note.                  "

## 2024-06-24 NOTE — BRIEF OP NOTE
[facility]    Brief Operative Note    Pre-operative diagnosis: Invasive ductal carcinoma of right breast, stage 1 (H) [C50.911]  Post-operative diagnosis Same as pre-operative diagnosis    Procedure: RIGHT BREAST LUMPECTOMY, RIGHT SENTINEL NODE BIOPSY,, Right - Breast  INSERTION VASCULAR ACCESS PORT, N/A - Neck    Surgeon: Surgeons and Role:     * Bindu Patel MD - Primary     * Maureen Hall PA-C  Anesthesia: General   Estimated Blood Loss: Less than 50 ml    Drains: None  Specimens: * No specimens in log *  Findings:   Medial and lateral margins close on gross, new margins sent. Node 1 1313, node 2 6 .  Complications: None.  Implants: * No implants in log *

## 2024-06-24 NOTE — ANESTHESIA PROCEDURE NOTES
Airway       Patient location during procedure: OR  Staff -        CRNA: Jaxon Rolon APRN CRNA       Performed By: CRNA  Consent for Airway        Urgency: elective  Indications and Patient Condition       Indications for airway management: mariza-procedural       Induction type:intravenous       Mask difficulty assessment: 1 - vent by mask    Final Airway Details       Final airway type: supraglottic airway    Supraglottic Airway Details        Type: LMA       Brand: I-Gel       LMA size: 4    Post intubation assessment        Placement verified by: capnometry, equal breath sounds and chest rise        Number of attempts at approach: 1       Number of other approaches attempted: 0       Secured with: tape       Ease of procedure: easy       Dentition: Intact and Unchanged

## 2024-06-24 NOTE — ANESTHESIA POSTPROCEDURE EVALUATION
Patient: Jenni Torres    Procedure: Procedure(s):  RIGHT BREAST LUMPECTOMY, RIGHT SENTINEL NODE BIOPSY,  INSERTION VASCULAR ACCESS PORT       Anesthesia Type:  General    Note:  Disposition: Outpatient   Postop Pain Control:    PONV: No   Neuro/Psych: Uneventful            Sign Out: Acceptable/Baseline neuro status   Airway/Respiratory: Uneventful            Sign Out: Acceptable/Baseline resp. status   CV/Hemodynamics: Uneventful            Sign Out: Acceptable CV status; No obvious hypovolemia; No obvious fluid overload   Other NRE:    DID A NON-ROUTINE EVENT OCCUR? No           Last vitals:  Vitals Value Taken Time   BP 96/59 06/24/24 1535   Temp 96.9  F (36.1  C) 06/24/24 1510   Pulse 59 06/24/24 1545   Resp 12 06/24/24 1545   SpO2 100 % 06/24/24 1545   Vitals shown include unfiled device data.    Electronically Signed By: Lorene Diego MD  June 24, 2024  3:46 PM

## 2024-06-28 LAB
PATH REPORT.COMMENTS IMP SPEC: ABNORMAL
PATH REPORT.COMMENTS IMP SPEC: YES
PATH REPORT.FINAL DX SPEC: ABNORMAL
PATH REPORT.GROSS SPEC: ABNORMAL
PATH REPORT.INTRAOP OBS SPEC DOC: ABNORMAL
PATH REPORT.MICROSCOPIC SPEC OTHER STN: ABNORMAL
PATH REPORT.MICROSCOPIC SPEC OTHER STN: ABNORMAL
PATH REPORT.RELEVANT HX SPEC: ABNORMAL
PATHOLOGY SYNOPTIC REPORT: ABNORMAL
PHOTO IMAGE: ABNORMAL

## 2024-06-30 DIAGNOSIS — C50.911 INVASIVE DUCTAL CARCINOMA OF RIGHT BREAST, STAGE 1 (H): Primary | ICD-10-CM

## 2024-06-30 NOTE — PROGRESS NOTES
Discussed pathology results on the phone with Jenni.  Clear margins on the invasive tumor and negative lymph nodes but focally positive margins for DCIS.  Recommended re-excision of superior and lateral margins. (The anterior margin was already right at the skin and some of the breast tissue  off the tumor during dissection so I do think that anterior margin is clear already).  She is in agreement with the plan.  Will forward to my office  with goal to get her in within a couple weeks for re-excision margins  Bindu Patel MD

## 2024-07-01 ENCOUNTER — TELEPHONE (OUTPATIENT)
Dept: SURGERY | Facility: CLINIC | Age: 54
End: 2024-07-01
Payer: COMMERCIAL

## 2024-07-01 NOTE — TELEPHONE ENCOUNTER
Type of surgery: RIGHT BREAST LUMPECTOMY, RE-EXCISION FOR MARGINS   Location of surgery: [facility]  Date and time of surgery: 7-11-24, 1 PM  Surgeon: DR BONILLA   Pre-Op Appt Date: PATIENT TO SCHEDULE   Post-Op Appt Date: NA   Packet sent out: Yes  Pre-cert/Authorization completed:  Not Applicable  Date: 7-1-24        RIGHT BREAST LUMPECTOMY, RE-EXCISION FOR MARGINS GENERAL H&P DONE 6/19 45 MINS REQ PA ASSIST JLS ALW  (60 mins average)

## 2024-07-02 ENCOUNTER — PATIENT OUTREACH (OUTPATIENT)
Dept: ONCOLOGY | Facility: CLINIC | Age: 54
End: 2024-07-02
Payer: COMMERCIAL

## 2024-07-02 NOTE — TELEPHONE ENCOUNTER
[facility]: Cancer Care                                                                                          Writer faxed ONCOTYPE order to [employer]. Confirmed via [employer] 7/2/24.    Leo Eaton, RN, BSN.  RN Care Coordinator     [facility]   [phone]

## 2024-07-11 ENCOUNTER — HOSPITAL ENCOUNTER (OUTPATIENT)
Facility: CLINIC | Age: 54
Discharge: HOME | End: 2024-07-11
Attending: SURGERY | Admitting: SURGERY
Payer: COMMERCIAL

## 2024-07-11 ENCOUNTER — VIRTUAL VISIT (OUTPATIENT)
Dept: ONCOLOGY | Facility: CLINIC | Age: 54
End: 2024-07-11
Attending: INTERNAL MEDICINE
Payer: COMMERCIAL

## 2024-07-11 ENCOUNTER — ANESTHESIA EVENT (OUTPATIENT)
Dept: SURGERY | Facility: CLINIC | Age: 54
End: 2024-07-11
Payer: COMMERCIAL

## 2024-07-11 ENCOUNTER — APPOINTMENT (OUTPATIENT)
Dept: SURGERY | Facility: PHYSICIAN GROUP | Age: 54
End: 2024-07-11
Payer: COMMERCIAL

## 2024-07-11 ENCOUNTER — ANESTHESIA (OUTPATIENT)
Dept: SURGERY | Facility: CLINIC | Age: 54
End: 2024-07-11
Payer: COMMERCIAL

## 2024-07-11 ENCOUNTER — PATIENT OUTREACH (OUTPATIENT)
Dept: ONCOLOGY | Facility: CLINIC | Age: 54
End: 2024-07-11

## 2024-07-11 VITALS
HEART RATE: 57 BPM | HEIGHT: 67 IN | RESPIRATION RATE: 16 BRPM | TEMPERATURE: 97 F | BODY MASS INDEX: 22.07 KG/M2 | SYSTOLIC BLOOD PRESSURE: 100 MMHG | DIASTOLIC BLOOD PRESSURE: 84 MMHG | OXYGEN SATURATION: 100 % | WEIGHT: 140.6 LBS

## 2024-07-11 VITALS — WEIGHT: 140 LBS | HEIGHT: 67 IN | BODY MASS INDEX: 21.97 KG/M2

## 2024-07-11 DIAGNOSIS — C50.411 MALIGNANT NEOPLASM OF UPPER-OUTER QUADRANT OF RIGHT BREAST IN FEMALE, ESTROGEN RECEPTOR POSITIVE (H): Primary | ICD-10-CM

## 2024-07-11 DIAGNOSIS — Z17.0 MALIGNANT NEOPLASM OF UPPER-OUTER QUADRANT OF RIGHT BREAST IN FEMALE, ESTROGEN RECEPTOR POSITIVE (H): Primary | ICD-10-CM

## 2024-07-11 PROCEDURE — 19301 PARTIAL MASTECTOMY: CPT | Mod: 58 | Performed by: SURGERY

## 2024-07-11 PROCEDURE — 250N000011 HC RX IP 250 OP 636: Performed by: SURGERY

## 2024-07-11 PROCEDURE — 250N000009 HC RX 250

## 2024-07-11 PROCEDURE — 250N000011 HC RX IP 250 OP 636

## 2024-07-11 PROCEDURE — 999N000141 HC STATISTIC PRE-PROCEDURE NURSING ASSESSMENT: Performed by: SURGERY

## 2024-07-11 PROCEDURE — 370N000017 HC ANESTHESIA TECHNICAL FEE, PER MIN: Performed by: SURGERY

## 2024-07-11 PROCEDURE — 360N000075 HC SURGERY LEVEL 2, PER MIN: Performed by: SURGERY

## 2024-07-11 PROCEDURE — 258N000003 HC RX IP 258 OP 636

## 2024-07-11 PROCEDURE — 19301 PARTIAL MASTECTOMY: CPT | Mod: AS | Performed by: PHYSICIAN ASSISTANT

## 2024-07-11 PROCEDURE — 250N000009 HC RX 250: Performed by: SURGERY

## 2024-07-11 PROCEDURE — 710N000009 HC RECOVERY PHASE 1, LEVEL 1, PER MIN: Performed by: SURGERY

## 2024-07-11 PROCEDURE — 88307 TISSUE EXAM BY PATHOLOGIST: CPT | Mod: 26 | Performed by: STUDENT IN AN ORGANIZED HEALTH CARE EDUCATION/TRAINING PROGRAM

## 2024-07-11 PROCEDURE — 250N000025 HC SEVOFLURANE, PER MIN: Performed by: SURGERY

## 2024-07-11 PROCEDURE — 88307 TISSUE EXAM BY PATHOLOGIST: CPT | Mod: TC | Performed by: SURGERY

## 2024-07-11 PROCEDURE — 258N000003 HC RX IP 258 OP 636: Performed by: ANESTHESIOLOGY

## 2024-07-11 PROCEDURE — 272N000001 HC OR GENERAL SUPPLY STERILE: Performed by: SURGERY

## 2024-07-11 PROCEDURE — 99215 OFFICE O/P EST HI 40 MIN: CPT | Mod: 95 | Performed by: INTERNAL MEDICINE

## 2024-07-11 PROCEDURE — 710N000012 HC RECOVERY PHASE 2, PER MINUTE: Performed by: SURGERY

## 2024-07-11 RX ORDER — CEFAZOLIN SODIUM/WATER 2 G/20 ML
2 SYRINGE (ML) INTRAVENOUS SEE ADMIN INSTRUCTIONS
Status: DISCONTINUED | OUTPATIENT
Start: 2024-07-11 | End: 2024-07-11 | Stop reason: HOSPADM

## 2024-07-11 RX ORDER — PROPOFOL 10 MG/ML
INJECTION, EMULSION INTRAVENOUS PRN
Status: DISCONTINUED | OUTPATIENT
Start: 2024-07-11 | End: 2024-07-11

## 2024-07-11 RX ORDER — METHYLPREDNISOLONE SODIUM SUCCINATE 125 MG/2ML
125 INJECTION, POWDER, LYOPHILIZED, FOR SOLUTION INTRAMUSCULAR; INTRAVENOUS
Status: CANCELLED
Start: 2024-07-25

## 2024-07-11 RX ORDER — METHADONE HYDROCHLORIDE 10 MG/ML
INJECTION, SOLUTION INTRAMUSCULAR; INTRAVENOUS; SUBCUTANEOUS PRN
Status: DISCONTINUED | OUTPATIENT
Start: 2024-07-11 | End: 2024-07-11

## 2024-07-11 RX ORDER — CEFAZOLIN SODIUM/WATER 2 G/20 ML
2 SYRINGE (ML) INTRAVENOUS
Status: COMPLETED | OUTPATIENT
Start: 2024-07-11 | End: 2024-07-11

## 2024-07-11 RX ORDER — ONDANSETRON 4 MG/1
4 TABLET, ORALLY DISINTEGRATING ORAL EVERY 30 MIN PRN
Status: DISCONTINUED | OUTPATIENT
Start: 2024-07-11 | End: 2024-07-11 | Stop reason: HOSPADM

## 2024-07-11 RX ORDER — SODIUM CHLORIDE, SODIUM LACTATE, POTASSIUM CHLORIDE, CALCIUM CHLORIDE 600; 310; 30; 20 MG/100ML; MG/100ML; MG/100ML; MG/100ML
INJECTION, SOLUTION INTRAVENOUS CONTINUOUS
Status: DISCONTINUED | OUTPATIENT
Start: 2024-07-11 | End: 2024-07-11 | Stop reason: HOSPADM

## 2024-07-11 RX ORDER — NALOXONE HYDROCHLORIDE 0.4 MG/ML
0.1 INJECTION, SOLUTION INTRAMUSCULAR; INTRAVENOUS; SUBCUTANEOUS
Status: DISCONTINUED | OUTPATIENT
Start: 2024-07-11 | End: 2024-07-11 | Stop reason: HOSPADM

## 2024-07-11 RX ORDER — HEPARIN SODIUM (PORCINE) LOCK FLUSH IV SOLN 100 UNIT/ML 100 UNIT/ML
5 SOLUTION INTRAVENOUS
Status: CANCELLED | OUTPATIENT
Start: 2024-07-25

## 2024-07-11 RX ORDER — DEXAMETHASONE SODIUM PHOSPHATE 4 MG/ML
4 INJECTION, SOLUTION INTRA-ARTICULAR; INTRALESIONAL; INTRAMUSCULAR; INTRAVENOUS; SOFT TISSUE
Status: DISCONTINUED | OUTPATIENT
Start: 2024-07-11 | End: 2024-07-11 | Stop reason: HOSPADM

## 2024-07-11 RX ORDER — MAGNESIUM HYDROXIDE 1200 MG/15ML
LIQUID ORAL PRN
Status: DISCONTINUED | OUTPATIENT
Start: 2024-07-11 | End: 2024-07-11 | Stop reason: HOSPADM

## 2024-07-11 RX ORDER — ALBUTEROL SULFATE 0.83 MG/ML
2.5 SOLUTION RESPIRATORY (INHALATION) EVERY 4 HOURS PRN
Status: DISCONTINUED | OUTPATIENT
Start: 2024-07-11 | End: 2024-07-11 | Stop reason: HOSPADM

## 2024-07-11 RX ORDER — ALBUTEROL SULFATE 0.83 MG/ML
2.5 SOLUTION RESPIRATORY (INHALATION)
Status: CANCELLED | OUTPATIENT
Start: 2024-07-25

## 2024-07-11 RX ORDER — DEXAMETHASONE SODIUM PHOSPHATE 4 MG/ML
INJECTION, SOLUTION INTRA-ARTICULAR; INTRALESIONAL; INTRAMUSCULAR; INTRAVENOUS; SOFT TISSUE PRN
Status: DISCONTINUED | OUTPATIENT
Start: 2024-07-11 | End: 2024-07-11

## 2024-07-11 RX ORDER — ONDANSETRON 2 MG/ML
INJECTION INTRAMUSCULAR; INTRAVENOUS PRN
Status: DISCONTINUED | OUTPATIENT
Start: 2024-07-11 | End: 2024-07-11

## 2024-07-11 RX ORDER — KETOROLAC TROMETHAMINE 30 MG/ML
INJECTION, SOLUTION INTRAMUSCULAR; INTRAVENOUS PRN
Status: DISCONTINUED | OUTPATIENT
Start: 2024-07-11 | End: 2024-07-11

## 2024-07-11 RX ORDER — HYDROMORPHONE HYDROCHLORIDE 1 MG/ML
0.5 INJECTION, SOLUTION INTRAMUSCULAR; INTRAVENOUS; SUBCUTANEOUS EVERY 5 MIN PRN
Status: DISCONTINUED | OUTPATIENT
Start: 2024-07-11 | End: 2024-07-11 | Stop reason: HOSPADM

## 2024-07-11 RX ORDER — OXYCODONE HYDROCHLORIDE 5 MG/1
10 TABLET ORAL
Status: DISCONTINUED | OUTPATIENT
Start: 2024-07-11 | End: 2024-07-11 | Stop reason: HOSPADM

## 2024-07-11 RX ORDER — ACETAMINOPHEN 325 MG/1
650 TABLET ORAL ONCE
Status: CANCELLED | OUTPATIENT
Start: 2024-07-25

## 2024-07-11 RX ORDER — EPINEPHRINE 1 MG/ML
0.3 INJECTION, SOLUTION INTRAMUSCULAR; SUBCUTANEOUS EVERY 5 MIN PRN
Status: CANCELLED | OUTPATIENT
Start: 2024-07-25

## 2024-07-11 RX ORDER — LORAZEPAM 2 MG/ML
0.5 INJECTION INTRAMUSCULAR EVERY 4 HOURS PRN
Status: CANCELLED | OUTPATIENT
Start: 2024-07-25

## 2024-07-11 RX ORDER — MEPERIDINE HYDROCHLORIDE 25 MG/ML
25 INJECTION INTRAMUSCULAR; INTRAVENOUS; SUBCUTANEOUS EVERY 30 MIN PRN
Status: CANCELLED | OUTPATIENT
Start: 2024-07-25

## 2024-07-11 RX ORDER — LABETALOL HYDROCHLORIDE 5 MG/ML
10 INJECTION, SOLUTION INTRAVENOUS
Status: DISCONTINUED | OUTPATIENT
Start: 2024-07-11 | End: 2024-07-11 | Stop reason: HOSPADM

## 2024-07-11 RX ORDER — OXYCODONE HYDROCHLORIDE 5 MG/1
5 TABLET ORAL
Status: DISCONTINUED | OUTPATIENT
Start: 2024-07-11 | End: 2024-07-11 | Stop reason: HOSPADM

## 2024-07-11 RX ORDER — DIPHENHYDRAMINE HCL 25 MG
50 CAPSULE ORAL ONCE
Status: CANCELLED
Start: 2024-07-25

## 2024-07-11 RX ORDER — GLYCOPYRROLATE 0.2 MG/ML
INJECTION, SOLUTION INTRAMUSCULAR; INTRAVENOUS PRN
Status: DISCONTINUED | OUTPATIENT
Start: 2024-07-11 | End: 2024-07-11

## 2024-07-11 RX ORDER — LIDOCAINE HYDROCHLORIDE 20 MG/ML
INJECTION, SOLUTION INFILTRATION; PERINEURAL PRN
Status: DISCONTINUED | OUTPATIENT
Start: 2024-07-11 | End: 2024-07-11

## 2024-07-11 RX ORDER — FENTANYL CITRATE 50 UG/ML
50 INJECTION, SOLUTION INTRAMUSCULAR; INTRAVENOUS EVERY 5 MIN PRN
Status: DISCONTINUED | OUTPATIENT
Start: 2024-07-11 | End: 2024-07-11 | Stop reason: HOSPADM

## 2024-07-11 RX ORDER — LIDOCAINE 40 MG/G
CREAM TOPICAL
Status: DISCONTINUED | OUTPATIENT
Start: 2024-07-11 | End: 2024-07-11 | Stop reason: HOSPADM

## 2024-07-11 RX ORDER — MAGNESIUM SULFATE HEPTAHYDRATE 40 MG/ML
2 INJECTION, SOLUTION INTRAVENOUS
Status: DISCONTINUED | OUTPATIENT
Start: 2024-07-11 | End: 2024-07-11 | Stop reason: HOSPADM

## 2024-07-11 RX ORDER — ALBUTEROL SULFATE 90 UG/1
1-2 AEROSOL, METERED RESPIRATORY (INHALATION)
Status: CANCELLED
Start: 2024-07-25

## 2024-07-11 RX ORDER — ONDANSETRON 2 MG/ML
4 INJECTION INTRAMUSCULAR; INTRAVENOUS EVERY 30 MIN PRN
Status: DISCONTINUED | OUTPATIENT
Start: 2024-07-11 | End: 2024-07-11 | Stop reason: HOSPADM

## 2024-07-11 RX ORDER — HYDRALAZINE HYDROCHLORIDE 20 MG/ML
5-10 INJECTION INTRAMUSCULAR; INTRAVENOUS EVERY 10 MIN PRN
Status: DISCONTINUED | OUTPATIENT
Start: 2024-07-11 | End: 2024-07-11 | Stop reason: HOSPADM

## 2024-07-11 RX ORDER — HEPARIN SODIUM,PORCINE 10 UNIT/ML
5-20 VIAL (ML) INTRAVENOUS DAILY PRN
Status: CANCELLED | OUTPATIENT
Start: 2024-07-25

## 2024-07-11 RX ORDER — BUPIVACAINE HYDROCHLORIDE 2.5 MG/ML
INJECTION, SOLUTION EPIDURAL; INFILTRATION; INTRACAUDAL PRN
Status: DISCONTINUED | OUTPATIENT
Start: 2024-07-11 | End: 2024-07-11 | Stop reason: HOSPADM

## 2024-07-11 RX ORDER — ACETAMINOPHEN 325 MG/1
650 TABLET ORAL
Status: DISCONTINUED | OUTPATIENT
Start: 2024-07-11 | End: 2024-07-11 | Stop reason: HOSPADM

## 2024-07-11 RX ORDER — KETOROLAC TROMETHAMINE 15 MG/ML
15 INJECTION, SOLUTION INTRAMUSCULAR; INTRAVENOUS
Status: DISCONTINUED | OUTPATIENT
Start: 2024-07-11 | End: 2024-07-11 | Stop reason: HOSPADM

## 2024-07-11 RX ORDER — DIPHENHYDRAMINE HYDROCHLORIDE 50 MG/ML
50 INJECTION INTRAMUSCULAR; INTRAVENOUS
Status: CANCELLED
Start: 2024-07-25

## 2024-07-11 RX ORDER — METHADONE HYDROCHLORIDE 10 MG/ML
2 INJECTION, SOLUTION INTRAMUSCULAR; INTRAVENOUS; SUBCUTANEOUS 3 TIMES DAILY PRN
Status: DISCONTINUED | OUTPATIENT
Start: 2024-07-11 | End: 2024-07-11 | Stop reason: HOSPADM

## 2024-07-11 RX ADMIN — GLYCOPYRROLATE 0.2 MG: 0.2 INJECTION, SOLUTION INTRAMUSCULAR; INTRAVENOUS at 13:30

## 2024-07-11 RX ADMIN — DEXMEDETOMIDINE HYDROCHLORIDE 0.5 MCG/KG/HR: 100 INJECTION, SOLUTION INTRAVENOUS at 13:20

## 2024-07-11 RX ADMIN — METHADONE HYDROCHLORIDE 10 MG: 10 INJECTION, SOLUTION INTRAMUSCULAR; INTRAVENOUS; SUBCUTANEOUS at 13:20

## 2024-07-11 RX ADMIN — PHENYLEPHRINE HYDROCHLORIDE 100 MCG: 10 INJECTION INTRAVENOUS at 13:30

## 2024-07-11 RX ADMIN — KETOROLAC TROMETHAMINE 15 MG: 30 INJECTION, SOLUTION INTRAMUSCULAR at 13:20

## 2024-07-11 RX ADMIN — ONDANSETRON 4 MG: 2 INJECTION INTRAMUSCULAR; INTRAVENOUS at 13:20

## 2024-07-11 RX ADMIN — PROPOFOL 140 MG: 10 INJECTION, EMULSION INTRAVENOUS at 13:20

## 2024-07-11 RX ADMIN — Medication 2 G: at 13:12

## 2024-07-11 RX ADMIN — MIDAZOLAM 2 MG: 1 INJECTION INTRAMUSCULAR; INTRAVENOUS at 13:12

## 2024-07-11 RX ADMIN — DEXAMETHASONE SODIUM PHOSPHATE 8 MG: 4 INJECTION, SOLUTION INTRA-ARTICULAR; INTRALESIONAL; INTRAMUSCULAR; INTRAVENOUS; SOFT TISSUE at 13:20

## 2024-07-11 RX ADMIN — SODIUM CHLORIDE, POTASSIUM CHLORIDE, SODIUM LACTATE AND CALCIUM CHLORIDE: 600; 310; 30; 20 INJECTION, SOLUTION INTRAVENOUS at 12:00

## 2024-07-11 RX ADMIN — LIDOCAINE HYDROCHLORIDE 50 MG: 20 INJECTION, SOLUTION INFILTRATION; PERINEURAL at 13:20

## 2024-07-11 ASSESSMENT — ACTIVITIES OF DAILY LIVING (ADL)
ADLS_ACUITY_SCORE: 31
ADLS_ACUITY_SCORE: 29

## 2024-07-11 ASSESSMENT — ENCOUNTER SYMPTOMS: SEIZURES: 1

## 2024-07-11 ASSESSMENT — PAIN SCALES - GENERAL: PAINLEVEL: NO PAIN (0)

## 2024-07-11 NOTE — ANESTHESIA CARE TRANSFER NOTE
Patient: Jenni Torres    Procedure: Procedure(s):  RE-EXCISION RIGHT BREAST LUMPECTOMY MARGINS       Diagnosis: Invasive ductal carcinoma of right breast, stage 1 (H) [C50.911]  Diagnosis Additional Information: No value filed.    Anesthesia Type:   General     Note:    Oropharynx: oropharynx clear of all foreign objects and spontaneously breathing  Level of Consciousness: awake  Oxygen Supplementation: face mask  Level of Supplemental Oxygen (L/min / FiO2): 8l  Independent Airway: airway patency satisfactory and stable  Dentition: dentition unchanged  Vital Signs Stable: post-procedure vital signs reviewed and stable  Report to RN Given: handoff report given  Patient transferred to: PACU  Comments: Pt to PACU, VSS, report to RN  Handoff Report: Identifed the Patient, Identified the Reponsible Provider, Reviewed the pertinent medical history, Discussed the surgical course, Reviewed Intra-OP anesthesia mangement and issues during anesthesia, Set expectations for post-procedure period and Allowed opportunity for questions and acknowledgement of understanding      Vitals:  Vitals Value Taken Time   BP     Temp     Pulse 74 07/11/24 1404   Resp 17 07/11/24 1404   SpO2 100 % 07/11/24 1404   Vitals shown include unfiled device data.    Electronically Signed By: PIETRO Bazzi CRNA  July 11, 2024  2:04 PM

## 2024-07-11 NOTE — ANESTHESIA POSTPROCEDURE EVALUATION
Patient: Jenni Torres    Procedure: Procedure(s):  RE-EXCISION RIGHT BREAST LUMPECTOMY MARGINS       Anesthesia Type:  General    Note:  Disposition: Outpatient   Postop Pain Control: Uneventful            Sign Out: Well controlled pain   PONV: No   Neuro/Psych: Uneventful            Sign Out: Acceptable/Baseline neuro status   Airway/Respiratory: Uneventful            Sign Out: Acceptable/Baseline resp. status   CV/Hemodynamics: Uneventful            Sign Out: Acceptable CV status   Other NRE: NONE   DID A NON-ROUTINE EVENT OCCUR? No           Last vitals:  Vitals Value Taken Time   /74 07/11/24 1435   Temp     Pulse 72 07/11/24 1443   Resp 18 07/11/24 1443   SpO2 98 % 07/11/24 1444   Vitals shown include unfiled device data.    Electronically Signed By: Frank Dudley MD  July 11, 2024  3:27 PM

## 2024-07-11 NOTE — ANESTHESIA PROCEDURE NOTES
Airway         Procedure Start/Stop Times: 7/11/2024 1:22 PM  Staff -        Anesthesiologist:  Frank Dudley MD       CRNA: Chidi Banks APRN CRNA       Performed By: CRNAIndications and Patient Condition       Indications for airway management: mariza-procedural       Induction type:intravenous       Mask difficulty assessment: 0 - not attempted    Final Airway Details       Final airway type: supraglottic airway    Supraglottic Airway Details        Type: LMA       Brand: I-Gel       LMA size: 4    Post intubation assessment        Placement verified by: capnometry, equal breath sounds and chest rise        Number of attempts at approach: 1       Number of other approaches attempted: 0       Secured with: commercial tube [place]       Ease of procedure: easy       Dentition: Intact and Unchanged    Medication(s) Administered   Medication Administration Time: 7/11/2024 1:22 PM

## 2024-07-11 NOTE — PROGRESS NOTES
[facility]: Cancer Care                                                                                          Dr. Mariee is prescribing weekly taxol and herceptin for Jenni. [facility] notes on the medications sent to Jenni via [email or web address]. Formal education session scheduled tomorrow 7/12/24 at 10am.    Maureen Casanova, RN, BSN, OCN, CBCN  Nurse Care Coordinator  [facility] [place]  P: [phone]     F: [phone]

## 2024-07-11 NOTE — LETTER
7/11/2024      Jenni BOWENS Melissa  [address]  [place] [address]      Dear Colleague,    Thank you for referring your patient, Jenni Torres, to the [facility]. Please see a copy of my visit note below.    Virtual Visit Details      Jenni is here for medical oncology follow up     Her visit today is done with a telehealth visit    Patient location hospital  Physician location [facility]    Patient is given consent        Chief complaint:  New diagnosis of right-sided infiltrating ductal carcinoma at the 12 o'clock position of the right breast ER/NJ positive, HER2 receptor positive, Ki 67 15%  Status post right-sided lumpectomy with sentinel lymph node biopsy on 6/24/2024  Here to review pathology.  Due to have reresection of margins today.          History presenting complaint:  Jenni is a 53-year-old premenopausal patient whom I saw in initial consultation on 6/4/2024.  She was diagnosed with a small HER2 positive breast cancer which was 1.3 cm in greatest dimension.  She went for a right-sided lumpectomy with sentinel lymph node biopsy on 6/24/2024 and I reviewed the pathology with her today.  She had an invasive ductal carcinoma grade 2 which measured 1.3 cm in greatest dimension.  There was associated DCIS.  All margins for the invasive tumor were negative but she did have positive margins on the DCIS portion.  She had 3 sentinel lymph nodes removed all of which were negative.  Stage of disease postresection is a T1c N0 infiltrating ductal carcinoma of the right breast.  ER/NJ strongly positive HER2 receptor positive, Ki-67 was 15%.  She is going back to surgery today for reresection of the DCIS margin.      I discussed with her today the pathology and discussed a treatment plan.  I think she would do well with de-escalation of therapy given her stage of disease and the biology.  For that reason I have recommended 12 weeks of weekly Taxol and Herceptin.  The  Herceptin then would continue for a full year total.  We have discussed the schedule of treatment which would be weekly for the first 12 weeks and then she can switch over to every 3 weeks for the Herceptin portion until she finishes a complete year.  We have discussed the risk benefits and side effects of both the Taxol portion and the Herceptin portion.  Prior to starting any treatment she will need an echocardiogram to check her ejection fraction.  I have also encouraged her to come in for formal chemotherapy teaching.  Once her chemotherapy portion is complete she will be be a good candidate then to add in hormonal therapy.    Having discussed all of the treatment plan at length including the risk benefits and side effects the patient is in agreement with the plan and has given consent.    Assuming that her DCIS margin will be negative after today's reresection the patient will also be a candidate for adjuvant radiation therapy which would be done after the chemotherapy portion.      Overall she feels well and has no major complaints today.  She tolerated the first surgery very well.      12 point comprehensive review of systems has been done with her today and she denies cough shortness of breath bone pain or any worrisome symptomatology.      Medications and allergies are outlined in the nursing records.      Past medical and surgical history are as of my records of 6/4/2024.      Physical exam is deferred today because this is a telehealth visit.        Data review:      I have reviewed the pathology at length today.  I have discussed with her treatment plan which would be de-escalation of HER2 directed therapy with weekly Taxol Herceptin for 12 weeks and then switch over to every 3-week Herceptin to complete out a full year.  She will also be a candidate for adjuvant radiation therapy and adjuvant hormonal treatment.      Impression and plan:      53-year-old patient with a HER2 positive small node-negative  breast cancer as outlined above of the right breast.  When she completes that her reresection of margins which is happening today, she will come in for formal chemotherapy teaching of Taxol Herceptin combination.  I have written orders for that today.    Assuming her margins are negative and she does not need any further surgery she will be a candidate after Taxol Herceptin for adjuvant treatment followed by adjuvant herceptin and radiation   She will also be a candidate for adjuvant hormonal RX       She is in agreement with the plan   I have written for an echocardiogram         Time spent 40 mins          Jonathon mariee MD       Again, thank you for allowing me to participate in the care of your patient.        Sincerely,        Jonathon Mariee MD

## 2024-07-11 NOTE — OP NOTE
General Surgery Operative Note      Pre-operative diagnosis: Invasive ductal carcinoma of right breast, stage 1 (H) [C50.911]   Post-operative diagnosis: Same   Procedure: Right breast re-excision superior and lateral lumpectomy margins   Surgeon: Bindu Patel MD   Assistant(s): Jessi Mccann PA-C  The Physician Assistant was medically necessary for their expertise in prepping, suctioning, suturing and retraction.   Anesthesia: general    Estimated blood loss:  Complications: 5 cc  none   Specimens: ID Type Source Tests Collected by Time Destination   1 : RIGHT BREAST LUMPECTOMY, SUPERIOR MARGIN, stitch marks new margin Tissue Breast, Right SURGICAL PATHOLOGY EXAM Bindu Patel MD 7/11/2024  1:34 PM    2 : RIGHT BREAST LUMPECTOMY, LATERAL MARGIN, stitch marks new margin Tissue Breast, Right SURGICAL PATHOLOGY EXAM Bindu Patel MD 7/11/2024  1:39 PM           Indications for operation: This is a 53 year old female who underwent right lumpectomy and sentinel node biopsy 6/24/24 . Superior and lateral margins were positive for DCIS, all margins clear for the invasive tumor. We discussed indications for surgery and she opted to proceed with  re-excision of the superior and lateral margins. We also discussed that pathology reported a focal positive anterior margin for DCIS, however during dissection of the lumpectomy the anterior breast tissue  off the surface of the tumor and the anterior margin was skin, so I really believe that margin is clear.      Description of procedure:   After induction of anesthesia, the right arm, breast, and chest were prepped and draped in standard fashion.   The lumpectomy was performed by reopening the incision over the breast in the 12 o'clock position. Superior and lateral margins were taken with johnnie scissors and marked with a stitch. New clips were placed at the superior and lateral margins.  Hemostasis was achieved with cautery.  The incision was anesthetized  with 0.25% marcaine. The incision was closed with a 3-0 interrupted deep and 4-0 Vicryl subcuticular closure in a running fashion.   Dermabond was applied. At the end of the operation, all sponge, instrument, and needle counts were correct.         Bnidu Patel MD

## 2024-07-11 NOTE — NURSING NOTE
Current patient location:  Pt will be at [facility] as she has surgery at 11am    Is the patient currently in the state of [place]? YES    Visit mode:VIDEO    If the visit is dropped, the patient can be reconnected by: VIDEO VISIT: Text to cell phone:   Telephone Information:   Mobile [phone]       Will anyone else be joining the visit? NO  (If patient encounters technical issues they should call [phone]    How would you like to obtain your AVS? MyChart    Are changes needed to the allergy or medication list? Pt stated no changes to allergies and Pt stated no med changes    Are refills needed on medications prescribed by this physician? NO    Reason for visit: SASCHA Ridley LPN

## 2024-07-11 NOTE — PROGRESS NOTES
Writer outreached [employer] to cancel oncotype order for Jenni given she is HER2+.    Maureen Casanova, RN, BSN, OCN, CBCN  Nurse Care Coordinator  [facility] [place]  P: [phone]     F: [phone]

## 2024-07-11 NOTE — LETTER
7/11/2024      Jenni BOWENS Melissa  [address]  [place] [address]      Dear Colleague,    Thank you for referring your patient, Jenni Torres, to the [facility]. Please see a copy of my visit note below.    Virtual Visit Details      Jenni is here for medical oncology follow up     Her visit today is done with a telehealth visit    Patient location hospital  Physician location [facility]    Patient is given consent        Chief complaint:  New diagnosis of right-sided infiltrating ductal carcinoma at the 12 o'clock position of the right breast ER/OH positive, HER2 receptor positive, Ki 67 15%  Status post right-sided lumpectomy with sentinel lymph node biopsy on 6/24/2024  Here to review pathology.  Due to have reresection of margins today.          History presenting complaint:  Jenni is a 53-year-old premenopausal patient whom I saw in initial consultation on 6/4/2024.  She was diagnosed with a small HER2 positive breast cancer which was 1.3 cm in greatest dimension.  She went for a right-sided lumpectomy with sentinel lymph node biopsy on 6/24/2024 and I reviewed the pathology with her today.  She had an invasive ductal carcinoma grade 2 which measured 1.3 cm in greatest dimension.  There was associated DCIS.  All margins for the invasive tumor were negative but she did have positive margins on the DCIS portion.  She had 3 sentinel lymph nodes removed all of which were negative.  Stage of disease postresection is a T1c N0 infiltrating ductal carcinoma of the right breast.  ER/OH strongly positive HER2 receptor positive, Ki-67 was 15%.  She is going back to surgery today for reresection of the DCIS margin.      I discussed with her today the pathology and discussed a treatment plan.  I think she would do well with de-escalation of therapy given her stage of disease and the biology.  For that reason I have recommended 12 weeks of weekly Taxol and Herceptin.  The  Herceptin then would continue for a full year total.  We have discussed the schedule of treatment which would be weekly for the first 12 weeks and then she can switch over to every 3 weeks for the Herceptin portion until she finishes a complete year.  We have discussed the risk benefits and side effects of both the Taxol portion and the Herceptin portion.  Prior to starting any treatment she will need an echocardiogram to check her ejection fraction.  I have also encouraged her to come in for formal chemotherapy teaching.  Once her chemotherapy portion is complete she will be be a good candidate then to add in hormonal therapy.    Having discussed all of the treatment plan at length including the risk benefits and side effects the patient is in agreement with the plan and has given consent.    Assuming that her DCIS margin will be negative after today's reresection the patient will also be a candidate for adjuvant radiation therapy which would be done after the chemotherapy portion.      Overall she feels well and has no major complaints today.  She tolerated the first surgery very well.      12 point comprehensive review of systems has been done with her today and she denies cough shortness of breath bone pain or any worrisome symptomatology.      Medications and allergies are outlined in the nursing records.      Past medical and surgical history are as of my records of 6/4/2024.      Physical exam is deferred today because this is a telehealth visit.        Data review:      I have reviewed the pathology at length today.  I have discussed with her treatment plan which would be de-escalation of HER2 directed therapy with weekly Taxol Herceptin for 12 weeks and then switch over to every 3-week Herceptin to complete out a full year.  She will also be a candidate for adjuvant radiation therapy and adjuvant hormonal treatment.      Impression and plan:      53-year-old patient with a HER2 positive small node-negative  breast cancer as outlined above of the right breast.  When she completes that her reresection of margins which is happening today, she will come in for formal chemotherapy teaching of Taxol Herceptin combination.  I have written orders for that today.    Assuming her margins are negative and she does not need any further surgery she will be a candidate after Taxol Herceptin for adjuvant treatment followed by adjuvant herceptin and radiation   She will also be a candidate for adjuvant hormonal RX       She is in agreement with the plan   I have written for an echocardiogram         Time spent 40 mins          Jonathon mariee MD       Again, thank you for allowing me to participate in the care of your patient.        Sincerely,        Jonathon Mariee MD

## 2024-07-11 NOTE — ANESTHESIA PREPROCEDURE EVALUATION
Anesthesia Pre-Procedure Evaluation    Patient: Jenni Torres   MRN: [id number] : 1970        Procedure : Procedure(s):  LUMPECTOMY, BREAST, RE-EXCISION MARGINS          Past Medical History:   Diagnosis Date    Hypothyroidism     Kidney stone 2018    Seizures (H)       Past Surgical History:   Procedure Laterality Date    BIOPSY      Breast    COLONOSCOPY      INSERT PORT VASCULAR ACCESS Left 2024    Procedure: INSERTION VASCULAR ACCESS PORT;  Surgeon: Bindu Patel MD;  Location: [facility] OR    LUMPECTOMY BREAST WITH SENTINEL NODE, COMBINED Right 2024    Procedure: RIGHT BREAST LUMPECTOMY, RIGHT SENTINEL NODE BIOPSY,;  Surgeon: Bindu Patel MD;  Location: [facility] OR    WISDOM TOOTH EXTRACTION        No Known Allergies   Social History     Tobacco Use    Smoking status: Never    Smokeless tobacco: Never   Substance Use Topics    Alcohol use: Yes     Comment: occasionally      Wt Readings from Last 1 Encounters:   24 63.8 kg (140 lb 9.6 oz)        Anesthesia Evaluation   Pt has had prior anesthetic.     No history of anesthetic complications       ROS/MED HX  ENT/Pulmonary:  - neg pulmonary ROS     Neurologic:     (+)       seizures,                         Cardiovascular:  - neg cardiovascular ROS     METS/Exercise Tolerance:     Hematologic:  - neg hematologic  ROS     Musculoskeletal:  - neg musculoskeletal ROS     GI/Hepatic:  - neg GI/hepatic ROS     Renal/Genitourinary:     (+) renal disease,      Nephrolithiasis ,       Endo:     (+)          thyroid problem, hypothyroidism,           Psychiatric/Substance Use:  - neg psychiatric ROS     Infectious Disease:  - neg infectious disease ROS     Malignancy:   (+) Malignancy, History of Breast.Breast CA Active status post.      Other:            Physical Exam    Airway        Mallampati: II   TM distance: > 3 FB   Neck ROM: full   Mouth opening: > 3 cm    Respiratory Devices and Support         Dental       (+) Minor Abnormalities - some  "fillings, tiny chips      Cardiovascular          Rhythm and rate: regular and normal     Pulmonary           breath sounds clear to auscultation           OUTSIDE LABS:  CBC:   Lab Results   Component Value Date    WBC 7.5 06/19/2024    WBC 7.5 11/08/2023    HGB 13.4 06/19/2024    HGB 12.3 11/08/2023    HCT 41.1 06/19/2024    HCT 38.8 11/08/2023     06/19/2024     11/08/2023     BMP:   Lab Results   Component Value Date     06/19/2024     11/08/2023    POTASSIUM 3.9 06/19/2024    POTASSIUM 4.7 11/08/2023    CHLORIDE 105 06/19/2024    CHLORIDE 105 11/08/2023    CO2 27 06/19/2024    CO2 25 11/08/2023    BUN 8.5 06/19/2024    BUN 15.5 11/08/2023    CR 0.89 06/19/2024    CR 0.96 (H) 11/08/2023    GLC 93 06/19/2024    GLC 84 11/08/2023     COAGS: No results found for: \"PTT\", \"INR\", \"FIBR\"  POC: No results found for: \"BGM\", \"HCG\", \"HCGS\"  HEPATIC:   Lab Results   Component Value Date    ALBUMIN 4.1 11/08/2023    PROTTOTAL 7.1 11/08/2023    ALT 12 11/08/2023    AST 19 11/08/2023    ALKPHOS 39 11/08/2023    BILITOTAL 0.2 11/08/2023     OTHER:   Lab Results   Component Value Date    LEDA 9.1 06/19/2024    TSH 2.07 05/09/2024    SED 8 11/26/2023       Anesthesia Plan    ASA Status:  2    NPO Status:  NPO Appropriate    Anesthesia Type: General.     - Airway: LMA   Induction: Intravenous.   Maintenance: Balanced.   Techniques and Equipment:       - Drips/Meds: Dexmed. infusion     Consents    Anesthesia Plan(s) and associated risks, benefits, and realistic alternatives discussed. Questions answered and patient/representative(s) expressed understanding.     - Discussed:     - Discussed with:  Patient      - Extended Intubation/Ventilatory Support Discussed: No.      - Patient is DNR/DNI Status: No     Use of blood products discussed: No .     Postoperative Care    Pain management: IV analgesics, Oral pain medications, Multi-modal analgesia.   PONV prophylaxis: Dexamethasone or Solumedrol, Ondansetron " (or other 5HT-3)     Comments:               Frank Dudley MD

## 2024-07-11 NOTE — DISCHARGE INSTRUCTIONS
HOME CARE FOLLOWING LUMPECTOMY  POOL Montanez, KATEY Pisano C. Pratt, J. Shaheen    APPOINTMENT WITH YOUR SURGEON:  All patients are to schedule a post-op appointment with their general surgeon.  Once you are home, call the office to schedule the appointment for 2-3 weeks from the date of your surgery.  You may need to be seen sooner if you have a drain in place.      Appointments to see your general surgeon at any of our locations can be made by calling:  #[phone]    You will receive a phone call from your surgeon with these results.  You will be able to ask questions and receive more in-depth explanation at your post-op appointment.  This appointment will also be when you discuss further evaluation, treatment, and referral to oncology and/or radiation oncology if this is necessary.  Occasionally special testing must be done on the surgical specimen which may delay the posting of your final pathology report.  You may call for your final pathology report after 1p.m. three working days after surgery to check on its status.    SUPPORT:  Wear a bra for support and comfort for 3-7 days, day and night.    INCISIONAL CARE:  If you have a dressing in place, keep clean and dry for 48 hours; you may replace the gauze if it becomes soiled.  After 48 hours you may remove the dressing and shower.  Do not submerse incision in water for 1 week.  If you have a Dermabond dressing (a type of skin glue), you may shower immediately.  Sutures will absorb and do not need to be removed.  If present, leave the steri-strips (white paper tapes) in place for 14 days after surgery.  If present, leave Dermabond glue in place until it wears/flakes off.  You may expect a small amount of drainage from your incision.  A lump/ridge under the incision is normal and will gradually resolve.    BATHING:  You are allowed to bath (shallow water in a tub only) or shower 24-48 hours after your surgery.  Mild soap is OK to use  near these sites.  When bathing, do not allow the incision or drain site to become submersed in water as this may increase the risk of infection.    ACTIVITY:  Cautiously resume exercise and strenuous activities such as jogging, tennis, aerobics, etc. Also, be careful of stretching activities with operative side for two weeks.    DIET:  No restrictions.  Increased fluid intake is recommended. While taking pain medications, increase dietary fiber or add a fiber supplementation like Metamucil or Citrucel to help prevent constipation - a possible side effect of pain medications.    DISCOMFORT:  Local anesthetic placed at surgery should provide relief for 4-8 hours.  Begin taking pain pills before discomfort is severe.  Take the pain medication with some food, when possible, to minimize side effects.  Intermittent use of ice packs may help during the first 48 hours.  Expect gradual improvement.  Recommend the following over the counter medications:  - Ibuprofen (motrin) 600mg every 6 hours (max 2,400mg per day)   - Tylenol (acetaminophen) 500-1000mg every 6 hours (max 4,000mg per day)  - Take with food if GI upset occurs  - If additional pain medication is needed, take the narcotic    RETURN APPOINTMENT:  Schedule a follow-up visit 2-3 weeks post-op.  Office Phone:  [phone]     CONTACT US IF THE FOLLOWING DEVELOPS:   1. A fever that is above 101     2. If there is a large amount of drainage, bleeding, or swelling.   3. Severe pain that is not relieved by your prescription.   4. Drainage that is thick, cloudy, yellow, green or white.   5. Any other questions not answered by  Frequently Asked Questions  sheet.      FREQUENTLY ASKED QUESTIONS:    Q:  How should my incision look?    A:  Normally your incision will appear slightly swollen with light redness directly along the incision itself as it heals.  It may feel like a bump or ridge as the healing/scarring happens, and over time (3-4 months) this bump or ridge  feeling should slowly go away.  In general, clear or pink watery drainage can be normal at first as your incision heals, but should decrease over time.    Q:  How do I know if my incision is infected?  A:  Look at your incision for signs of infection, like redness around the incision spreading to surrounding skin, or drainage of cloudy or foul-smelling drainage.  If you feel warm, check your temperature to see if you are running a fever.    **If any of these things occur, please notify the nurse at our office.  We may need you to come into the office for an incision check.      Q:  How do I take care of my incision?  A:  If you have a dressing in place - Starting the day after surgery, replace the dressing 1-2 times a day until there is no further drainage from the incision.  At that time, a dressing is no longer needed.  Try to minimize tape on the skin if irritation is occurring at the tape sites.  If you have significant irritation from tape on the skin, please call the office to discuss other method of dressing your incision.    Small pieces of tape called  steri-strips  may be present directly overlying your incision; these may be removed 10 days after surgery unless otherwise specified by your surgeon.  If these tapes start to loosen at the ends, you may trim them back until they fall off or are removed.    A:  If you had  Dermabond  tissue glue used as a dressing (this causes your incision to look shiny with a clear covering over it) - This type of dressing wears off with time and does not require more dressings over the top unless it is draining around the glue as it wears off.  Do not apply ointments or lotions over the incisions until the glue has completely worn off.    Q:  There is a piece of tape or a sticky  lead  still on my skin.  Can I remove this?  A:  Sometimes the sticky  leads  used for monitoring during surgery or for evaluation in the emergency department are not all removed while you are in  the hospital.  These sometimes have a tab or metal dot on them.  You can easily remove these on your own, like taking off a band-aid.  If there is a gel substance under the  lead , simply wipe/clean it off with a washcloth or paper towel.      Q:  What can I do to minimize constipation (very hard stools, or lack of stools)?  A:  Stay well hydrated.  Increase your dietary fiber intake or take a fiber supplement -with plenty of water.  Walk around frequently.  You may consider an over-the-counter stool-softener.  Your Pharmacist can assist you with choosing one that is stocked at your pharmacy.  Constipation is also one of the most common side effects of pain medication.  If you are using pain medication, be pro-active and try to PREVENT problems with constipation by taking the steps above BEFORE constipation becomes a problem.    Q:  What do I do if I need more pain medications?  A:  Call the office to receive refills.  Be aware that certain pain meds cannot be called into a pharmacy and actually require a paper prescription.  A change may be made in your pain med as you progress thru your recovery period or if you have side effects to certain meds.    --Pain meds are NOT refilled after 5pm on weekdays, and NOT AT ALL on the weekends, so please look ahead to prevent problems.      Q:  Why am I having a hard time sleeping now that I am at home?  A:  Many medications you receive while you are in the hospital can impact your sleep for a number of days after your surgery/hospitalization.  Decreased level of activity and naps during the day may also make sleeping at night difficult.  Try to minimize day-time naps, and get up frequently during the day to walk around your home during your recovery time.  Sleep aides may be of some help, but are not recommended for long-term use.      Q:  I am having some back discomfort.  What should I do?  A:  This may be related to certain positioning that was required for your surgery,  extended periods of time in bed, or other changes in your overall activity level.  You may try ice, heat, acetaminophen, or ibuprofen to treat this temporarily.  Note that many pain medications have acetaminophen in them and would state this on the prescription bottle.  Be sure not to exceed the maximum of 4000mg per day of acetaminophen.     **If the pain you are having does not resolve, is severe, or is a flare of back pain you have had on other occasions prior to surgery, please contact your primary physician for further recommendations or for an appointment to be examined at their office.    Q:  Why am I having headaches?  A:  Headaches can be caused by many things:  caffeine withdrawal, use of pain meds, dehydration, high blood pressure, lack of sleep, over-activity/exhaustion, flare-up of usual migraine headaches.  If you feel this is related to muscle tension (a band-like feeling around the head, or a pressure at the low-back of the head) you may try ice or heat to this area.  You may need to drink more fluids (try electrolyte drink like Gatorade), rest, or take your usual migraine medications.   **If your headaches do not resolve, worsen, are accompanied by other symptoms, or if your blood pressure is high, please call your primary physician for recommendation and/or examination.    Q:  I am unable to urinate.  What do I do?  A:  A small percentage of people can have difficulty urinating initially after surgery.  This includes being able to urinate only a very small amount at a time and feeling discomfort or pressure in the very low abdomen.  This is called  urinary retention , and is actually an urgent situation.  Proceed to your nearest Emergency department for evaluation (not an Urgent Care Center).  Sometimes the bladder does not work correctly after certain medications you receive during surgery, or related to certain procedures.  You may need to have a catheter placed until your bladder recovers.  When  planning to go to an Emergency department, it may help to call the ER to let them know you are coming in for this problem after a surgery.  This may help you get in quicker to be evaluated.  **If you have symptoms of a urinary tract infection, please contact your primary physician for the proper evaluation and treatment.          If you have other questions, please call the office Monday thru Friday between 8am and 4:30pm to discuss with the nurse or physician assistant.  #[phone]    There is a surgeon ON CALL on weekday evenings and over the weekend in case of urgent need only, and may be contacted at the same number.    If you are having an emergency, call 911 or proceed to your nearest emergency department.    Maximum acetaminophen (Tylenol) dose from all sources should not exceed 4 grams (4000 mg) per day.  You received Toradol, an IV form of Ibuprofen (Motrin) at 1:20 pm.  Do not take any Ibuprofen products until 7:20 pm.

## 2024-07-12 ENCOUNTER — APPOINTMENT (OUTPATIENT)
Dept: ONCOLOGY | Facility: CLINIC | Age: 54
End: 2024-07-12
Attending: INTERNAL MEDICINE
Payer: COMMERCIAL

## 2024-07-12 RX ORDER — PROCHLORPERAZINE MALEATE 10 MG
10 TABLET ORAL EVERY 6 HOURS PRN
Qty: 30 TABLET | Refills: 2 | Status: SHIPPED | OUTPATIENT
Start: 2024-07-17

## 2024-07-12 RX ORDER — DEXAMETHASONE 4 MG/1
8 TABLET ORAL DAILY
Qty: 8 TABLET | Refills: 0 | Status: SHIPPED | OUTPATIENT
Start: 2024-07-12

## 2024-07-12 NOTE — PROGRESS NOTES
[facility]: Cancer Care Plan of Care Education Note                                    Discussion with Patient:                                                      Writer connected over the phone with Jenni to review chemotherapy education surrounding taxol and herceptin. Written materials from [facility] previously sent to Jenni via [employer] with receipt confirmed.     Antiemetics: Pre-med zofran, PRN compazine  Steroid use/side effect: Pre-med IV and PO dexamethasone  Medication understanding/side effect: Weekly taxol and herceptin x 12  Port concerns: [place], placed 6/24/24 with Dr. Patel    Assessment:                                                      Assessment completed with:: Patient    Plan of Care Education   Yearly learning assessment completed?: Yes (see Education tab)  Diagnosis:: Triple positive breast cancer  Does patient understand diagnosis?: Yes  Tx plan/regimen:: Weekly taxol and herceptin x 12  Does patient understand treatment plan/regimen?: Yes  Preparing for treatment:: Reviewed treatment preparation information with patient (vascular access, day of chemo, visitor policy, what to bring, etc.)  Vascular access education provided for:: Port  Side effect education:: Diarrhea/Constipation;Lab value monitoring (anemia, neutropenia, thrombocytopenia);Skin changes;Urinary;Sexual health;Fatigue;Hair loss;Mouth sores;Nausea/Vomiting;Neuropathy  Supportive services education provided for:: Oncology behavioral health;Social work;Nutrition  Safety/self care at home reviewed with patient:: Yes  Coping - concerns/fears reviewed with patient:: Yes  Plan of Care:: Treatment schedule  When to call provider:: Bleeding;Uncontrolled diarrhea/constipation;Increased shortness of breath;Uncontrolled nausea/vomiting;New/worsening pain;Shaking chills;Temperature >100.4F  Reasons for deferring treatment reviewed with patient:: Yes  Additional education provided for: : Neutropenic precautions    Evaluation  of Learning  Patient Education Provided: Yes  Readiness:: Acceptance;Eager  Method:: Booklet/Handout;Explanation;Teach Back  Response:: Verbalizes understanding    Intervention/Education provided during outreach:                                                       Writer reviewed general chemotherapy education with Jenni including infusion procedures, what to wear/bring, when to call the provider, and port safety. Writer reviewed [facility] notes on taxol and herceptin including administration, pre-medications, mechanism of action, and side effect profile. Jenni verbalized understanding to alert the care team of any side effects including vomiting, diarrhea, neuropathy, or edema.      Writer reviewed anticipated myelosuppression with Jenni and proper infection prevention practices. Writer encouraged Jenni to check her temperature frequently and to call the clinic right away with any fevers or signs/symptoms of infection. Writer educated on lab monitoring and the potential to defer or postpone treatments for patient safety if lab parameters are not met. Jenni verbalized understanding of all of the above.     Jenni was counseled on PRN compazine--prescription sent to [facility]. Writer also counseled Jenni on dexamethasone prescription. She will take her first dose on the evening prior to her first infusion and second dose the morning of her infusion. She verbalized understanding to repeat this with cycle 2 infusion. Jenni will bring both bottles to her first infusion appointment to further review with pharmacy staff.      Writer discussed ancillary support services with Jenni including dietician, palliative care, , behavioral health, and social work. She will let joser know if any of these services could be of use to her. Joser sent wig and financial resources information to Jenni via [employer] per her request.     Writer confirmed that Jenni has the clinic phone number to  call with any further questions or concerns. She verbalized understanding that the clinic number can be used 24/7 for triage support. Writer confirmed cycle 1 infusion for 7/25/24 with labs the day prior on 7/24/24.     Maureen Casanova, RN, BSN, OCN, CBCN  Nurse Care Coordinator  [facility] [place]  P: [phone]     F: [phone]

## 2024-07-14 NOTE — PROGRESS NOTES
Jenni is here for medical oncology follow up     Her visit today is done with a telehealth visit    Patient location hospital  Physician location [facility]    Patient is given consent        Chief complaint:  New diagnosis of right-sided infiltrating ductal carcinoma at the 12 o'clock position of the right breast ER/DE positive, HER2 receptor positive, Ki 67 15%  Status post right-sided lumpectomy with sentinel lymph node biopsy on 6/24/2024  Here to review pathology.  Due to have reresection of margins today.          History presenting complaint:  Jenni is a 53-year-old premenopausal patient whom I saw in initial consultation on 6/4/2024.  She was diagnosed with a small HER2 positive breast cancer which was 1.3 cm in greatest dimension.  She went for a right-sided lumpectomy with sentinel lymph node biopsy on 6/24/2024 and I reviewed the pathology with her today.  She had an invasive ductal carcinoma grade 2 which measured 1.3 cm in greatest dimension.  There was associated DCIS.  All margins for the invasive tumor were negative but she did have positive margins on the DCIS portion.  She had 3 sentinel lymph nodes removed all of which were negative.  Stage of disease postresection is a T1c N0 infiltrating ductal carcinoma of the right breast.  ER/DE strongly positive HER2 receptor positive, Ki-67 was 15%.  She is going back to surgery today for reresection of the DCIS margin.      I discussed with her today the pathology and discussed a treatment plan.  I think she would do well with de-escalation of therapy given her stage of disease and the biology.  For that reason I have recommended 12 weeks of weekly Taxol and Herceptin.  The Herceptin then would continue for a full year total.  We have discussed the schedule of treatment which would be weekly for the first 12 weeks and then she can switch over to every 3 weeks for the Herceptin portion until she finishes a complete year.  We have discussed the  risk benefits and side effects of both the Taxol portion and the Herceptin portion.  Prior to starting any treatment she will need an echocardiogram to check her ejection fraction.  I have also encouraged her to come in for formal chemotherapy teaching.  Once her chemotherapy portion is complete she will be be a good candidate then to add in hormonal therapy.    Having discussed all of the treatment plan at length including the risk benefits and side effects the patient is in agreement with the plan and has given consent.    Assuming that her DCIS margin will be negative after today's reresection the patient will also be a candidate for adjuvant radiation therapy which would be done after the chemotherapy portion.      Overall she feels well and has no major complaints today.  She tolerated the first surgery very well.      12 point comprehensive review of systems has been done with her today and she denies cough shortness of breath bone pain or any worrisome symptomatology.      Medications and allergies are outlined in the nursing records.      Past medical and surgical history are as of my records of 6/4/2024.      Physical exam is deferred today because this is a telehealth visit.        Data review:      I have reviewed the pathology at length today.  I have discussed with her treatment plan which would be de-escalation of HER2 directed therapy with weekly Taxol Herceptin for 12 weeks and then switch over to every 3-week Herceptin to complete out a full year.  She will also be a candidate for adjuvant radiation therapy and adjuvant hormonal treatment.      Impression and plan:      53-year-old patient with a HER2 positive small node-negative breast cancer as outlined above of the right breast.  When she completes that her reresection of margins which is happening today, she will come in for formal chemotherapy teaching of Taxol Herceptin combination.  I have written orders for that today.    Assuming her  margins are negative and she does not need any further surgery she will be a candidate after Taxol Herceptin for adjuvant treatment followed by adjuvant herceptin and radiation   She will also be a candidate for adjuvant hormonal RX       She is in agreement with the plan   I have written for an echocardiogram         Time spent 40 mins          Jonathon jeffers MD

## 2024-07-15 LAB
PATH REPORT.COMMENTS IMP SPEC: NORMAL
PATH REPORT.COMMENTS IMP SPEC: NORMAL
PATH REPORT.FINAL DX SPEC: NORMAL
PATH REPORT.GROSS SPEC: NORMAL
PATH REPORT.MICROSCOPIC SPEC OTHER STN: NORMAL
PATH REPORT.RELEVANT HX SPEC: NORMAL
PHOTO IMAGE: NORMAL

## 2024-07-18 ENCOUNTER — HOSPITAL ENCOUNTER (OUTPATIENT)
Dept: CARDIOLOGY | Facility: CLINIC | Age: 54
Discharge: HOME | End: 2024-07-18
Attending: INTERNAL MEDICINE | Admitting: INTERNAL MEDICINE
Payer: COMMERCIAL

## 2024-07-18 DIAGNOSIS — Z17.0 MALIGNANT NEOPLASM OF UPPER-OUTER QUADRANT OF RIGHT BREAST IN FEMALE, ESTROGEN RECEPTOR POSITIVE (H): ICD-10-CM

## 2024-07-18 DIAGNOSIS — C50.411 MALIGNANT NEOPLASM OF UPPER-OUTER QUADRANT OF RIGHT BREAST IN FEMALE, ESTROGEN RECEPTOR POSITIVE (H): ICD-10-CM

## 2024-07-18 LAB — LVEF ECHO: NORMAL

## 2024-07-18 PROCEDURE — 93306 TTE W/DOPPLER COMPLETE: CPT

## 2024-07-18 PROCEDURE — 93306 TTE W/DOPPLER COMPLETE: CPT | Mod: 26 | Performed by: INTERNAL MEDICINE

## 2024-07-18 PROCEDURE — 93356 MYOCRD STRAIN IMG SPCKL TRCK: CPT | Performed by: INTERNAL MEDICINE

## 2024-07-23 ENCOUNTER — OFFICE VISIT (OUTPATIENT)
Dept: SURGERY | Facility: CLINIC | Age: 54
End: 2024-07-23
Payer: COMMERCIAL

## 2024-07-23 VITALS
HEIGHT: 67 IN | SYSTOLIC BLOOD PRESSURE: 98 MMHG | WEIGHT: 140 LBS | HEART RATE: 68 BPM | RESPIRATION RATE: 16 BRPM | OXYGEN SATURATION: 97 % | BODY MASS INDEX: 21.97 KG/M2 | DIASTOLIC BLOOD PRESSURE: 56 MMHG

## 2024-07-23 DIAGNOSIS — C50.911 INVASIVE DUCTAL CARCINOMA OF RIGHT BREAST, STAGE 1 (H): Primary | ICD-10-CM

## 2024-07-23 PROCEDURE — 99024 POSTOP FOLLOW-UP VISIT: CPT | Performed by: SURGERY

## 2024-07-23 NOTE — LETTER
"July 23, 2024        RE:   Jenni Torres 1970      Dear Colleague,    Thank you for referring your patient, Jenni Torres, to [facility] at [facility]. Please see a copy of my visit note below.    [facility] Follow Up    Subjective:  Jenni is here for her first postoperative visit. She underwent right lumpectomy and sentinel node biopsy and port placement )for planned adjuvant chemotherapy given the her2+ tumor by recommendation of oncology) 6/24/24 . Superior and lateral margins were positive for DCIS, all margins clear for the invasive tumor. She then underwent re-excision of the superior and lateral margins.  Today she  tells me she is doing quite well. She currently has no pain. She never took any pain meds after the re-excision margin surgery. No concerns about healing.  She is starting chemotherapy this week.    Objective:  BP 98/56   Pulse 68   Resp 16   Ht 1.698 m (5' 6.85\")   Wt 63.5 kg (140 lb)   LMP 05/10/2024 (Exact Date)   SpO2 97%   BMI 22.03 kg/m    Breast - no edema, no ecchymosis  Axilla- no edema, no ecchymosis  Incisions - clean, dry, intact.  No erythema. Glue intact to breast incision.    Pathology:     6/24/24  Final Diagnosis   A.  Right breast, lumpectomy:  INVASIVE CARCINOMA OF NO SPECIAL TYPE (DUCTAL).  -Daren Grade: 2 (Tubule score = 3, Nuclear score = 2, Mitotic score = 1).  -Size:  13 x 9 x 7 mm.  -Lymphatic/Vascular Invasion:   Not identified.  -DUCTAL CARCINOMA IN SITU (DCIS):   Present, solid type, nuclear grade 3, without comeonecrosis.  -Margins for invasive carcinoma:   All margins negative for invasive carcinoma.    -Margins for DCIS:  Superior, Anterior and Lateral margins focally positive for DCIS (see part F).   -See Tumor Synoptic Report     B.  [place] lymph node #1, excision:  -One lymph node, negative for malignancy (0/1).     C.  [place] lymph node #1 part 2; excision:  -Muscular walled blood vessel and mature " adipose tissue.  -No lymph node tissue identified.     D.  [place] lymph node #2, excision:  -Two lymph nodes, both are negative for malignancy (0/2).     E.  Right breast, new medial margin:  -Benign breast tissue, negative for malignancy.     F.  Right breast, new lateral margin:  -DUCTAL CARCINOMA IN SITU (DCIS), cribriform type, nuclear grade 3, focally at the new inked lateral margin.        SPECIMEN   Procedure  Excision (less than total mastectomy)   Specimen Laterality  Right   TUMOR   Tumor Site  Clock position     12 o'clock   Tumor Site  Distance from nipple (Centimeters): 6 cm   Histologic Type  Invasive carcinoma of no special type (ductal)   Histologic Grade (Daren Histologic Score)     Glandular (Acinar) / Tubular Differentiation  Score 3   Nuclear Pleomorphism  Score 2   Mitotic Rate  Score 1   Overall Grade  Grade 2 (scores of 6 or 7)   Tumor Size  Greatest dimension of largest invasive focus (Millimeters): 13 mm   Additional Dimension (Millimeters)  9 mm     7 mm   Tumor Focality  Single focus of invasive carcinoma   Ductal Carcinoma In Situ (DCIS)  Present     Negative for extensive intraductal component (EIC)   Architectural Patterns  Cribriform     Solid   Nuclear Grade  Grade III (high)   Necrosis  Not identified   Lobular Carcinoma In Situ (LCIS)  Not identified   Lymphatic and / or Vascular Invasion  Not identified   Dermal Lymphatic and / or Vascular Invasion  No skin present   Microcalcifications  Present in non-neoplastic tissue   Treatment Effect in the Breast  No known presurgical therapy   MARGINS   Margin Status for Invasive Carcinoma  All margins negative for invasive carcinoma   Distance from Invasive Carcinoma to Closest Margin  2 mm   Closest Margin(s) to Invasive Carcinoma  Anterior   Distance from Invasive Carcinoma to Posterior Margin  9 mm   Distance from Invasive Carcinoma to Superior Margin  3 mm   Distance from Invasive Carcinoma to Inferior Margin  Greater than: 10 mm    Distance from Invasive Carcinoma to Medial Margin  3 mm   Distance from Invasive Carcinoma to Lateral Margin  5 mm   Margin Status for DCIS  DCIS present at margin   Margin(s) Involved by DCIS  Anterior: unifocal     Superior: unifocal     Lateral: unifocal   Distance from DCIS to Posterior Margin  1.5 mm   Distance from DCIS to Inferior Margin  Greater than: 10 mm   Distance from DCIS to Medial Margin  Greater than: 3 mm   REGIONAL LYMPH NODES   Regional Lymph Node Status  All regional lymph nodes negative for tumor   Total Number of Lymph Nodes Examined (sentinel and non-sentinel)  3   Number of [place] Nodes Examined  3   pTNM CLASSIFICATION (AJCC 8th Edition)   Reporting of pT, pN, and (when applicable) pM categories is based on information available to the pathologist at the time the report is issued. As per the AJCC (Chapter 1, 8th Ed.) it is the managing physician s responsibility to establish the final pathologic stage based upon all pertinent information, including but potentially not limited to this pathology report.   pT Category  pT1c   pN Category  pN0   N Suffix  (sn)   Breast Biomarker Testing Performed on Previous Biopsy     Estrogen Receptor (ER) Status  Positive (greater than 10% of cells demonstrate nuclear positivity)   Percentage of Cells with Nuclear Positivity  %   Breast Biomarker Testing Performed on Previous Biopsy     Progesterone Receptor (PgR) Status  Positive   Percentage of Cells with Nuclear Positivity  %   Breast Biomarker Testing Performed on Previous Biopsy     HER2 (by immunohistochemistry)  Positive (Score 3+)   Percentage of Cells with Uniform Intense Complete Membrane Staining  95 %   Breast Biomarker Testing Performed on Previous Biopsy     Ki-67 Percentage of Positive Nuclei  15 %   Testing Performed on Case Number  [id number]     7/11/24  Final Diagnosis   A.  Breast, right, superior margin, lumpectomy:  -Benign breast tissue with postsurgical changes.     B.   Breast, left, lateral margin, lumpectomy:  -Benign breast tissue with postsurgical changes.       Assessment:  53 year old F s/p lumpectomy and SLNB with final pathology showing [id number] grade 2 invasive ductal carcinoma ER/NH/Her2+, with takeback for positive DCIS margins, now clear.  Plan:  Follow up with oncology as planned for chemotherapy  Follow up with radiation oncology after chemotherapy for adjuvant post lumpectomy radiation  Return for port removal when done with chemotherapy      Again, thank you for allowing me to participate in the care of your patient.      Sincerely,      Bindu Patel MD

## 2024-07-23 NOTE — PATIENT INSTRUCTIONS
You are healing great. At this point after lumpectomy you have no activity restrictions!    Regarding port removal when you are done with chemotherapy - this will be done in the operating room. Can be done at the outpatient surgery center or the main operating room where you had your surgeries previously.  No lifting specific lifting restrictions after but you will want to be cautious with strenuous physical activity that involves stretching your chest and arm on the side of the port for about 1 week to let the incision heal.  Options are:  No sedation/just local anesthesia:   You can eat prior. You don't need a .  You will be awake and laying flat on the operating room table. Local anesthesia (Numbing medication) will be injected over the port site which burns/stings going in. Once that takes effect (works within 30 seconds) it will be numb and you should only feel pressure or pulling/tugging sensations throughout the procedure, which takes about 15 minutes.   2.  Deep sedation:   You cannot have anything to eat for 8 hours prior to the surgery time. You will need a .   The anesthesiologist will put you to sleep with medication through your IV. I will remove the port once you're asleep (numbing medication is still injected) and then they will wake you up and go to recovery for about 1 hour until the sedation wears off.

## 2024-07-23 NOTE — PROGRESS NOTES
"[facility]    Subjective:  Jenni is here for her first postoperative visit. She underwent right lumpectomy and sentinel node biopsy and port placement )for planned adjuvant chemotherapy given the her2+ tumor by recommendation of oncology) 6/24/24 . Superior and lateral margins were positive for DCIS, all margins clear for the invasive tumor. She then underwent re-excision of the superior and lateral margins.  Today she  tells me she is doing quite well. She currently has no pain. She never took any pain meds after the re-excision margin surgery. No concerns about healing.  She is starting chemotherapy this week.    Objective:  BP 98/56   Pulse 68   Resp 16   Ht 1.698 m (5' 6.85\")   Wt 63.5 kg (140 lb)   LMP 05/10/2024 (Exact Date)   SpO2 97%   BMI 22.03 kg/m    Breast - no edema, no ecchymosis  Axilla- no edema, no ecchymosis  Incisions - clean, dry, intact.  No erythema. Glue intact to breast incision.    Pathology:     6/24/24  Final Diagnosis   A.  Right breast, lumpectomy:  INVASIVE CARCINOMA OF NO SPECIAL TYPE (DUCTAL).  -Daren Grade: 2 (Tubule score = 3, Nuclear score = 2, Mitotic score = 1).  -Size:  13 x 9 x 7 mm.  -Lymphatic/Vascular Invasion:   Not identified.  -DUCTAL CARCINOMA IN SITU (DCIS):   Present, solid type, nuclear grade 3, without comeonecrosis.  -Margins for invasive carcinoma:   All margins negative for invasive carcinoma.    -Margins for DCIS:  Superior, Anterior and Lateral margins focally positive for DCIS (see part F).   -See Tumor Synoptic Report     B.  [place] lymph node #1, excision:  -One lymph node, negative for malignancy (0/1).     C.  [place] lymph node #1 part 2; excision:  -Muscular walled blood vessel and mature adipose tissue.  -No lymph node tissue identified.     D.  [place] lymph node #2, excision:  -Two lymph nodes, both are negative for malignancy (0/2).     E.  Right breast, new medial margin:  -Benign breast tissue, negative for " malignancy.     F.  Right breast, new lateral margin:  -DUCTAL CARCINOMA IN SITU (DCIS), cribriform type, nuclear grade 3, focally at the new inked lateral margin.        SPECIMEN   Procedure  Excision (less than total mastectomy)   Specimen Laterality  Right   TUMOR   Tumor Site  Clock position     12 o'clock   Tumor Site  Distance from nipple (Centimeters): 6 cm   Histologic Type  Invasive carcinoma of no special type (ductal)   Histologic Grade (Daren Histologic Score)     Glandular (Acinar) / Tubular Differentiation  Score 3   Nuclear Pleomorphism  Score 2   Mitotic Rate  Score 1   Overall Grade  Grade 2 (scores of 6 or 7)   Tumor Size  Greatest dimension of largest invasive focus (Millimeters): 13 mm   Additional Dimension (Millimeters)  9 mm     7 mm   Tumor Focality  Single focus of invasive carcinoma   Ductal Carcinoma In Situ (DCIS)  Present     Negative for extensive intraductal component (EIC)   Architectural Patterns  Cribriform     Solid   Nuclear Grade  Grade III (high)   Necrosis  Not identified   Lobular Carcinoma In Situ (LCIS)  Not identified   Lymphatic and / or Vascular Invasion  Not identified   Dermal Lymphatic and / or Vascular Invasion  No skin present   Microcalcifications  Present in non-neoplastic tissue   Treatment Effect in the Breast  No known presurgical therapy   MARGINS   Margin Status for Invasive Carcinoma  All margins negative for invasive carcinoma   Distance from Invasive Carcinoma to Closest Margin  2 mm   Closest Margin(s) to Invasive Carcinoma  Anterior   Distance from Invasive Carcinoma to Posterior Margin  9 mm   Distance from Invasive Carcinoma to Superior Margin  3 mm   Distance from Invasive Carcinoma to Inferior Margin  Greater than: 10 mm   Distance from Invasive Carcinoma to Medial Margin  3 mm   Distance from Invasive Carcinoma to Lateral Margin  5 mm   Margin Status for DCIS  DCIS present at margin   Margin(s) Involved by DCIS  Anterior: unifocal     Superior:  unifocal     Lateral: unifocal   Distance from DCIS to Posterior Margin  1.5 mm   Distance from DCIS to Inferior Margin  Greater than: 10 mm   Distance from DCIS to Medial Margin  Greater than: 3 mm   REGIONAL LYMPH NODES   Regional Lymph Node Status  All regional lymph nodes negative for tumor   Total Number of Lymph Nodes Examined (sentinel and non-sentinel)  3   Number of [place] Nodes Examined  3   pTNM CLASSIFICATION (AJCC 8th Edition)   Reporting of pT, pN, and (when applicable) pM categories is based on information available to the pathologist at the time the report is issued. As per the AJCC (Chapter 1, 8th Ed.) it is the managing physician s responsibility to establish the final pathologic stage based upon all pertinent information, including but potentially not limited to this pathology report.   pT Category  pT1c   pN Category  pN0   N Suffix  (sn)   Breast Biomarker Testing Performed on Previous Biopsy     Estrogen Receptor (ER) Status  Positive (greater than 10% of cells demonstrate nuclear positivity)   Percentage of Cells with Nuclear Positivity  %   Breast Biomarker Testing Performed on Previous Biopsy     Progesterone Receptor (PgR) Status  Positive   Percentage of Cells with Nuclear Positivity  %   Breast Biomarker Testing Performed on Previous Biopsy     HER2 (by immunohistochemistry)  Positive (Score 3+)   Percentage of Cells with Uniform Intense Complete Membrane Staining  95 %   Breast Biomarker Testing Performed on Previous Biopsy     Ki-67 Percentage of Positive Nuclei  15 %   Testing Performed on Case Number  [id number]     7/11/24  Final Diagnosis   A.  Breast, right, superior margin, lumpectomy:  -Benign breast tissue with postsurgical changes.     B.  Breast, left, lateral margin, lumpectomy:  -Benign breast tissue with postsurgical changes.       Assessment:  53 year old F s/p lumpectomy and SLNB with final pathology showing [id number] grade 2 invasive ductal carcinoma  ER/OH/Her2+, with takeback for positive DCIS margins, now clear.  Plan:  Follow up with oncology as planned for chemotherapy  Follow up with radiation oncology after chemotherapy for adjuvant post lumpectomy radiation  Return for port removal when done with chemotherapy    Bindu Patel MD

## 2024-07-24 ENCOUNTER — LAB (OUTPATIENT)
Dept: INFUSION THERAPY | Facility: CLINIC | Age: 54
End: 2024-07-24
Attending: INTERNAL MEDICINE
Payer: COMMERCIAL

## 2024-07-24 DIAGNOSIS — C50.411 MALIGNANT NEOPLASM OF UPPER-OUTER QUADRANT OF RIGHT BREAST IN FEMALE, ESTROGEN RECEPTOR POSITIVE (H): Primary | ICD-10-CM

## 2024-07-24 DIAGNOSIS — Z17.0 MALIGNANT NEOPLASM OF UPPER-OUTER QUADRANT OF RIGHT BREAST IN FEMALE, ESTROGEN RECEPTOR POSITIVE (H): Primary | ICD-10-CM

## 2024-07-24 LAB
ALBUMIN SERPL BCG-MCNC: 4 G/DL (ref 3.5–5.2)
ALP SERPL-CCNC: 45 U/L (ref 40–150)
ALT SERPL W P-5'-P-CCNC: 8 U/L (ref 0–50)
AST SERPL W P-5'-P-CCNC: 16 U/L (ref 0–45)
BASOPHILS # BLD AUTO: 0.1 10E3/UL (ref 0–0.2)
BASOPHILS NFR BLD AUTO: 1 %
BILIRUB DIRECT SERPL-MCNC: <0.2 MG/DL (ref 0–0.3)
BILIRUB SERPL-MCNC: 0.3 MG/DL
EOSINOPHIL # BLD AUTO: 0.1 10E3/UL (ref 0–0.7)
EOSINOPHIL NFR BLD AUTO: 1 %
ERYTHROCYTE [DISTWIDTH] IN BLOOD BY AUTOMATED COUNT: 12.6 % (ref 10–15)
HCT VFR BLD AUTO: 38.2 % (ref 35–47)
HGB BLD-MCNC: 12.6 G/DL (ref 11.7–15.7)
IMM GRANULOCYTES # BLD: 0 10E3/UL
IMM GRANULOCYTES NFR BLD: 0 %
LYMPHOCYTES # BLD AUTO: 1.4 10E3/UL (ref 0.8–5.3)
LYMPHOCYTES NFR BLD AUTO: 24 %
MCH RBC QN AUTO: 31 PG (ref 26.5–33)
MCHC RBC AUTO-ENTMCNC: 33 G/DL (ref 31.5–36.5)
MCV RBC AUTO: 94 FL (ref 78–100)
MONOCYTES # BLD AUTO: 0.3 10E3/UL (ref 0–1.3)
MONOCYTES NFR BLD AUTO: 6 %
NEUTROPHILS # BLD AUTO: 4.1 10E3/UL (ref 1.6–8.3)
NEUTROPHILS NFR BLD AUTO: 68 %
NRBC # BLD AUTO: 0 10E3/UL
NRBC BLD AUTO-RTO: 0 /100
PLATELET # BLD AUTO: 208 10E3/UL (ref 150–450)
PROT SERPL-MCNC: 6.6 G/DL (ref 6.4–8.3)
RBC # BLD AUTO: 4.07 10E6/UL (ref 3.8–5.2)
WBC # BLD AUTO: 6 10E3/UL (ref 4–11)

## 2024-07-24 PROCEDURE — 85025 COMPLETE CBC W/AUTO DIFF WBC: CPT | Performed by: INTERNAL MEDICINE

## 2024-07-24 PROCEDURE — 82040 ASSAY OF SERUM ALBUMIN: CPT | Performed by: INTERNAL MEDICINE

## 2024-07-24 PROCEDURE — 36415 COLL VENOUS BLD VENIPUNCTURE: CPT | Performed by: INTERNAL MEDICINE

## 2024-07-24 RX ORDER — HEPARIN SODIUM (PORCINE) LOCK FLUSH IV SOLN 100 UNIT/ML 100 UNIT/ML
5 SOLUTION INTRAVENOUS
Status: DISCONTINUED | OUTPATIENT
Start: 2024-07-24 | End: 2024-07-24 | Stop reason: HOSPADM

## 2024-07-24 NOTE — PROGRESS NOTES
Nursing Note:  Jenni Torres presents today for Lab draw.    Patient seen by provider today: No   present during visit today: Not Applicable.    Note: Patient has treatment tomorrow for D1C1. Patient has never had her port accessed before.  Decided to have blood draw via arm today, and not have her port accessed two days in a row.    Intravenous Access:  Lab draw site right AC, Needle type butterfly, Gauge 23.  Labs drawn without difficulty.    Discharge Plan:   Patient was sent home.    Marivel Paris RN

## 2024-07-25 ENCOUNTER — INFUSION THERAPY VISIT (OUTPATIENT)
Dept: INFUSION THERAPY | Facility: CLINIC | Age: 54
End: 2024-07-25
Attending: INTERNAL MEDICINE
Payer: COMMERCIAL

## 2024-07-25 VITALS
DIASTOLIC BLOOD PRESSURE: 67 MMHG | HEIGHT: 67 IN | WEIGHT: 143.6 LBS | TEMPERATURE: 98.1 F | HEART RATE: 62 BPM | RESPIRATION RATE: 16 BRPM | SYSTOLIC BLOOD PRESSURE: 105 MMHG | BODY MASS INDEX: 22.54 KG/M2 | OXYGEN SATURATION: 98 %

## 2024-07-25 DIAGNOSIS — C50.411 MALIGNANT NEOPLASM OF UPPER-OUTER QUADRANT OF RIGHT BREAST IN FEMALE, ESTROGEN RECEPTOR POSITIVE (H): Primary | ICD-10-CM

## 2024-07-25 DIAGNOSIS — Z17.0 MALIGNANT NEOPLASM OF UPPER-OUTER QUADRANT OF RIGHT BREAST IN FEMALE, ESTROGEN RECEPTOR POSITIVE (H): Primary | ICD-10-CM

## 2024-07-25 PROCEDURE — 96367 TX/PROPH/DG ADDL SEQ IV INF: CPT

## 2024-07-25 PROCEDURE — 250N000013 HC RX MED GY IP 250 OP 250 PS 637: Performed by: INTERNAL MEDICINE

## 2024-07-25 PROCEDURE — 250N000011 HC RX IP 250 OP 636: Performed by: INTERNAL MEDICINE

## 2024-07-25 PROCEDURE — 96415 CHEMO IV INFUSION ADDL HR: CPT

## 2024-07-25 PROCEDURE — 96413 CHEMO IV INFUSION 1 HR: CPT

## 2024-07-25 PROCEDURE — 96417 CHEMO IV INFUS EACH ADDL SEQ: CPT

## 2024-07-25 PROCEDURE — 96375 TX/PRO/DX INJ NEW DRUG ADDON: CPT

## 2024-07-25 PROCEDURE — 258N000003 HC RX IP 258 OP 636: Performed by: INTERNAL MEDICINE

## 2024-07-25 RX ORDER — HEPARIN SODIUM (PORCINE) LOCK FLUSH IV SOLN 100 UNIT/ML 100 UNIT/ML
500 SOLUTION INTRAVENOUS ONCE
Status: COMPLETED | OUTPATIENT
Start: 2024-07-25 | End: 2024-07-25

## 2024-07-25 RX ORDER — ACETAMINOPHEN 325 MG/1
650 TABLET ORAL ONCE
Status: COMPLETED | OUTPATIENT
Start: 2024-07-25 | End: 2024-07-25

## 2024-07-25 RX ADMIN — SODIUM CHLORIDE 250 MG: 9 INJECTION, SOLUTION INTRAVENOUS at 09:55

## 2024-07-25 RX ADMIN — DEXAMETHASONE SODIUM PHOSPHATE: 10 INJECTION, SOLUTION INTRAMUSCULAR; INTRAVENOUS at 09:21

## 2024-07-25 RX ADMIN — SODIUM CHLORIDE 250 ML: 9 INJECTION, SOLUTION INTRAVENOUS at 08:49

## 2024-07-25 RX ADMIN — ACETAMINOPHEN 650 MG: 325 TABLET ORAL at 08:48

## 2024-07-25 RX ADMIN — PACLITAXEL 138 MG: 6 INJECTION, SOLUTION INTRAVENOUS at 11:40

## 2024-07-25 RX ADMIN — FAMOTIDINE 20 MG: 10 INJECTION INTRAVENOUS at 08:56

## 2024-07-25 RX ADMIN — DIPHENHYDRAMINE HYDROCHLORIDE 50 MG: 50 INJECTION INTRAMUSCULAR; INTRAVENOUS at 09:05

## 2024-07-25 RX ADMIN — Medication 500 UNITS: at 12:50

## 2024-07-25 NOTE — PROGRESS NOTES
Infusion Nursing Note:  Jenni Torres presents today for Cycle 1 Taxol + Trazimera.    Patient seen by provider today: No   present during visit today: Not Applicable.    Note:  Patient reports is feeling well today.  Patient denies fevers, chills or signs of infection.    Chemotherapy Education completed on 7/12/2024 by PERTTY Deras.  Take Home Anti-Nausea Medication (Compazine) reviewed with patient today per Pharm D.    Dexamethasone:  Patient confirms that she did take her Dexamethasone 2 tablets last night and 2 tablets this morning.    Labs drawn on 7/24/24.    Pre-Medications given today:  Tylenol 650mg PO, Benadryl 50mg IVP, Pepcid 20mg IVP + Zofran and Dexamethasone IV.    Ice Packs used during Taxol infusion (Hands and Feet).    Writer noticed small pink area of skin irritation to right of port dressing when flushing port prior to de-access after completion of treatment today. No hives and no complaints of itching per patient.  This area disappeared within a few minutes. No other areas of rash or irritation present.  Patient denies feeling warm, itchy or SOB.  Patient reports is feeling well after completion of treatment.  VSS.  -Writer instructed patient to contact our office if develops rash or hives and/or if develops SOB or feeling warm.  Patient verbalized understanding of plan.  -Writer updated PRETTY Deras who will call patient tomorrow (day after Cycle 1).      **Patient did have skin irritation around border of port dressing after removing dressing.  Writer will put comment in appointment section of future appointments to use IV Standard 3000 port dressing**.      Intravenous Access:  Implanted Port.  Port site C/D/I.  -Mild irritation at incision site.  No pain, swelling or warmth present.  Port flushes well + excellent blood return.    Treatment Conditions:  Lab Results   Component Value Date    HGB 12.6 07/24/2024    WBC 6.0 07/24/2024    ANEU 5.3 11/22/2018     ANEUTAUTO 4.1 07/24/2024     07/24/2024        Results reviewed, labs MET treatment parameters, ok to proceed with treatment.  ECHO/MUGA completed 7/18/2024.  EF = 60%-65%.      Post Infusion Assessment:  Patient tolerated infusion without incident.  Blood return noted pre and post infusion.  Site patent and intact, free from redness, edema or discomfort.  No evidence of extravasations.  Access discontinued per protocol.       Discharge Plan:   Discharge instructions reviewed with: Patient.  Patient and/or family verbalized understanding of discharge instructions and all questions answered.  Patient discharged in stable condition accompanied by: friend.  Departure Mode: Ambulatory.  8/1/2024: Labs per Port,     Maren Torre, RN, BSN, OCN on 7/25/2024 at 1:45 PM

## 2024-07-26 ENCOUNTER — PATIENT OUTREACH (OUTPATIENT)
Dept: ONCOLOGY | Facility: CLINIC | Age: 54
End: 2024-07-26
Payer: COMMERCIAL

## 2024-07-26 NOTE — PROGRESS NOTES
[facility]: Cancer Care Follow-Up Note                                    Discussion with Patient:                                                      Writer outreached Jenni for check-in following cycle 1 taxol and herceptin yesterday. She reports feeling great. She is currently at work.     Antiemetics: Compazine PRN  Steroid use/side effect: Confirmed decadron w/ cycle 2  Medication understanding/side effect: denies further taxol/herceptin questions  Port concerns: Denies    Dates of Treatment:                                                      Infusion given in last 28 days       Administered MAR Action Medication Dose Rate Visit    07/25/2024 09:55 New Bag trastuzumab-qyyp (TRAZIMERA) 250 mg in sodium chloride 0.9 % 286.9 mL infusion 250 mg 191.3 mL/hr Infusion Therapy Visit on 07/25/2024 in [facility]    07/25/2024 11:40 New Bag PACLitaxel (TAXOL) 138 mg in sodium chloride 0.9% in non-PVC container 298 mL infusion 138 mg 298 mL/hr Infusion Therapy Visit on 07/25/2024 in [facility]            Assessment:                                                      Jenni endorses mild nausea last night, for which she took one dose of compazine with good effect. She has no other side effects or concerns at this time.    Intervention/Education provided during outreach:                                                       Writer confirmed that Jenni has the clinic contact information for any further questions or concerns. She understands that she can use the clinic phone number 24/7 with any triage concerns. Confirmed cycle 2 appointment for 8/1/24.    Maureen Casanova, RN, BSN, OCN, CBCN  Nurse Care Coordinator  [facility] [place]  P: [phone]     F: [phone]

## 2024-07-31 NOTE — PROGRESS NOTES
Oncology/Hematology Visit Note    Aug 1, 2024    Reason for visit: Follow up breast cancer    Oncology HPI: Jenni Torres is a 53 year old female with right-sided breast cancer, ER/DE positive, HER2 positive s/p right-sided lumpectomy with sentinel lymph node biopsy.  Pathology with invasive ductal carcinoma, grade 2, measured 1.3 cm in greatest dimension and associated DCIS.  All margins were negative on the invasive tumor, but she did have positive margins on the DCIS portion.  All 3 sentinel lymph nodes were negative.    Dr. Mariee recommended Taxol/Herceptin x 12 weeks, then followed by single agent Herceptin for total x 1 year.  Echo on 7/18/2024 with EF of 60-65%.  Taxol/Herceptin C1 D1 completed on 7/25/2024.    She is here today for Taxol/Herceptin C2 D1.    Interval History: Jenni is here today unaccompanied.  She tolerated cycle 1 last week really well.  She had 2 separate episodes of nausea, better with a dose of Compazine.  No vomiting, appetite has been really good.  She had some intermittent stomach cramping, did not require any meds and then resolved.  No fever, chills, headache, vision changes, rash, itching or leg pain or swelling.    Review of Systems: See interval hx. Denies fevers, chills, HA, dizziness,  changes in vision, CP, SOB, abdominal pain, diarrhea, changes in urination, bleeding, bruising, rash.     PMHx and Social Hx reviewed per EPIC.      Medications:  Current Outpatient Medications   Medication Sig Dispense Refill    dexAMETHasone (DECADRON) 4 MG tablet Take 2 tablets (8 mg) by mouth daily Take 2 tablets (8mg) the evening before chemotherapy infusion and 2 tablets (8mg) the morning of chemotherapy infusion, for first two chemotherapy infusions. Take with food. 8 tablet 0    levETIRAcetam (KEPPRA) 500 MG tablet Take 1 tablet (500 mg) by mouth daily 90 tablet 3    levothyroxine (SYNTHROID/LEVOTHROID) 137 MCG tablet Take 1 tablet (137 mcg) by mouth daily 90 tablet 1    Multiple  "Vitamin (ONE-A-DAY ESSENTIAL) TABS Take 1 tablet by mouth daily      prochlorperazine (COMPAZINE) 10 MG tablet Take 1 tablet (10 mg) by mouth every 6 hours as needed for nausea or vomiting 30 tablet 2    sertraline (ZOLOFT) 50 MG tablet Take 1 tablet (50 mg) by mouth daily 90 tablet 0    valACYclovir (VALTREX) 1000 mg tablet Take 2 tablets (2,000 mg) by mouth 2 times daily 4 tablet 3       No Known Allergies      EXAM:    /68   Pulse 73   Temp 98.4  F (36.9  C) (Temporal)   Resp 18   Ht 1.699 m (5' 6.89\")   Wt 63.8 kg (140 lb 11.2 oz)   LMP 05/10/2024 (Exact Date)   SpO2 97%   BMI 22.11 kg/m      GENERAL:  Female, in no acute distress.  Alert and oriented x3.   HEENT:  Normocephalic, atraumatic.    CV:  RRR, No murmurs, gallops, or rubs.   LUNGS:  Clear to auscultation bilaterally.   BREAST: Well-healing lumpectomy scar on the right side.  ABDOMEN:  Soft, nontender and nondistended.  Bowel sounds heard x4.  No apparent hepatosplenomegaly.   EXTREMITIES:  No clubbing, cyanosis, or edema.   SKIN: No rash, port left side, slight hyperpigmentation, but no erythema or tenderness.   PSYCH: Calm and cooperative       Labs:    08/01/24 08:05   WBC 6.9   Hemoglobin 12.1   Hematocrit 36.7   Platelet Count 211   RBC Count 3.92   MCV 94   MCH 30.9   MCHC 33.0   RDW 12.4   % Neutrophils 83   % Lymphocytes 13   % Monocytes 3   % Eosinophils 0   % Basophils 0   Absolute Basophils 0.0   Absolute Eosinophils 0.0   Absolute Immature Granulocytes 0.1   Absolute Lymphocytes 0.9   Absolute Monocytes 0.2   % Immature Granulocytes 1   Absolute Neutrophils 5.7   Absolute NRBCs 0.0   NRBCs per 100 WBC 0       Imaging: n/a    Impression/Plan: Jenni Torres is a 53 year old female with right-sided ER/WA positive, HER2 positive breast cancer s/p lumpectomy currently on adjuvant Taxol/Herceptin.    Breast cancer: Invasive ductal carcinoma, grade 2, 1.3 cm with associated DCIS.  All margins were negative on the invasive tumor, " but she did have positive margin on the DCIS.  All 3 sentinel lymph nodes negative.  She is also s/p right-sided lumpectomy and this is healing well.  She started adjuvant Taxol Herceptin with C1 D1 completed on 7/25/2024.  She tolerated cycle 1 really well.  Plan for 12 cycles total.  CBC revealed today and looks great,  ok for TH cycle 2 today.  I will see her again next week for cycle 3, then we will have provider visits every other treatment.    Nausea: Secondary to chemotherapy, likely Taxol.  She has Compazine and this is working well for her.  She only has to take it occasionally.      Chart documentation with Dragon Voice recognition Software. Although reviewed after completion, some words and grammatical errors may remain.    20 minutes spent on the date of the encounter doing chart review, review of test results, interpretation of tests, patient visit, and documentation     Love Coates PA-C  Hematology/Oncology  [facility]

## 2024-08-01 ENCOUNTER — INFUSION THERAPY VISIT (OUTPATIENT)
Dept: INFUSION THERAPY | Facility: CLINIC | Age: 54
End: 2024-08-01
Attending: INTERNAL MEDICINE
Payer: COMMERCIAL

## 2024-08-01 ENCOUNTER — ONCOLOGY VISIT (OUTPATIENT)
Dept: ONCOLOGY | Facility: CLINIC | Age: 54
End: 2024-08-01
Attending: INTERNAL MEDICINE
Payer: COMMERCIAL

## 2024-08-01 VITALS
OXYGEN SATURATION: 97 % | HEART RATE: 73 BPM | SYSTOLIC BLOOD PRESSURE: 103 MMHG | DIASTOLIC BLOOD PRESSURE: 68 MMHG | TEMPERATURE: 98.4 F | HEIGHT: 67 IN | BODY MASS INDEX: 22.08 KG/M2 | WEIGHT: 140.7 LBS | RESPIRATION RATE: 18 BRPM

## 2024-08-01 DIAGNOSIS — Z17.0 MALIGNANT NEOPLASM OF UPPER-OUTER QUADRANT OF RIGHT BREAST IN FEMALE, ESTROGEN RECEPTOR POSITIVE (H): Primary | ICD-10-CM

## 2024-08-01 DIAGNOSIS — C50.411 MALIGNANT NEOPLASM OF UPPER-OUTER QUADRANT OF RIGHT BREAST IN FEMALE, ESTROGEN RECEPTOR POSITIVE (H): Primary | ICD-10-CM

## 2024-08-01 DIAGNOSIS — R11.0 CHEMOTHERAPY-INDUCED NAUSEA: ICD-10-CM

## 2024-08-01 DIAGNOSIS — T45.1X5A CHEMOTHERAPY-INDUCED NAUSEA: ICD-10-CM

## 2024-08-01 LAB
BASOPHILS # BLD AUTO: 0 10E3/UL (ref 0–0.2)
BASOPHILS NFR BLD AUTO: 0 %
EOSINOPHIL # BLD AUTO: 0 10E3/UL (ref 0–0.7)
EOSINOPHIL NFR BLD AUTO: 0 %
ERYTHROCYTE [DISTWIDTH] IN BLOOD BY AUTOMATED COUNT: 12.4 % (ref 10–15)
HCT VFR BLD AUTO: 36.7 % (ref 35–47)
HGB BLD-MCNC: 12.1 G/DL (ref 11.7–15.7)
IMM GRANULOCYTES # BLD: 0.1 10E3/UL
IMM GRANULOCYTES NFR BLD: 1 %
LYMPHOCYTES # BLD AUTO: 0.9 10E3/UL (ref 0.8–5.3)
LYMPHOCYTES NFR BLD AUTO: 13 %
MCH RBC QN AUTO: 30.9 PG (ref 26.5–33)
MCHC RBC AUTO-ENTMCNC: 33 G/DL (ref 31.5–36.5)
MCV RBC AUTO: 94 FL (ref 78–100)
MONOCYTES # BLD AUTO: 0.2 10E3/UL (ref 0–1.3)
MONOCYTES NFR BLD AUTO: 3 %
NEUTROPHILS # BLD AUTO: 5.7 10E3/UL (ref 1.6–8.3)
NEUTROPHILS NFR BLD AUTO: 83 %
NRBC # BLD AUTO: 0 10E3/UL
NRBC BLD AUTO-RTO: 0 /100
PLATELET # BLD AUTO: 211 10E3/UL (ref 150–450)
RBC # BLD AUTO: 3.92 10E6/UL (ref 3.8–5.2)
WBC # BLD AUTO: 6.9 10E3/UL (ref 4–11)

## 2024-08-01 PROCEDURE — 96417 CHEMO IV INFUS EACH ADDL SEQ: CPT

## 2024-08-01 PROCEDURE — 85004 AUTOMATED DIFF WBC COUNT: CPT | Performed by: INTERNAL MEDICINE

## 2024-08-01 PROCEDURE — 250N000013 HC RX MED GY IP 250 OP 250 PS 637: Performed by: INTERNAL MEDICINE

## 2024-08-01 PROCEDURE — 99214 OFFICE O/P EST MOD 30 MIN: CPT | Mod: 25 | Performed by: PHYSICIAN ASSISTANT

## 2024-08-01 PROCEDURE — 250N000011 HC RX IP 250 OP 636: Performed by: INTERNAL MEDICINE

## 2024-08-01 PROCEDURE — 258N000003 HC RX IP 258 OP 636: Performed by: INTERNAL MEDICINE

## 2024-08-01 PROCEDURE — 36591 DRAW BLOOD OFF VENOUS DEVICE: CPT | Performed by: INTERNAL MEDICINE

## 2024-08-01 PROCEDURE — 99213 OFFICE O/P EST LOW 20 MIN: CPT | Mod: 24 | Performed by: PHYSICIAN ASSISTANT

## 2024-08-01 PROCEDURE — 250N000011 HC RX IP 250 OP 636: Performed by: PHYSICIAN ASSISTANT

## 2024-08-01 PROCEDURE — 96413 CHEMO IV INFUSION 1 HR: CPT

## 2024-08-01 PROCEDURE — 96375 TX/PRO/DX INJ NEW DRUG ADDON: CPT

## 2024-08-01 PROCEDURE — 96367 TX/PROPH/DG ADDL SEQ IV INF: CPT

## 2024-08-01 PROCEDURE — 258N000003 HC RX IP 258 OP 636: Performed by: PHYSICIAN ASSISTANT

## 2024-08-01 RX ORDER — HEPARIN SODIUM,PORCINE 10 UNIT/ML
5-20 VIAL (ML) INTRAVENOUS DAILY PRN
Status: CANCELLED | OUTPATIENT
Start: 2024-08-01

## 2024-08-01 RX ORDER — ALBUTEROL SULFATE 0.83 MG/ML
2.5 SOLUTION RESPIRATORY (INHALATION)
Status: CANCELLED | OUTPATIENT
Start: 2024-08-01

## 2024-08-01 RX ORDER — METHYLPREDNISOLONE SODIUM SUCCINATE 125 MG/2ML
125 INJECTION, POWDER, LYOPHILIZED, FOR SOLUTION INTRAMUSCULAR; INTRAVENOUS
Status: CANCELLED
Start: 2024-08-01

## 2024-08-01 RX ORDER — HEPARIN SODIUM (PORCINE) LOCK FLUSH IV SOLN 100 UNIT/ML 100 UNIT/ML
5 SOLUTION INTRAVENOUS
Status: DISCONTINUED | OUTPATIENT
Start: 2024-08-01 | End: 2024-08-01 | Stop reason: HOSPADM

## 2024-08-01 RX ORDER — MEPERIDINE HYDROCHLORIDE 25 MG/ML
25 INJECTION INTRAMUSCULAR; INTRAVENOUS; SUBCUTANEOUS EVERY 30 MIN PRN
Status: CANCELLED | OUTPATIENT
Start: 2024-08-01

## 2024-08-01 RX ORDER — EPINEPHRINE 1 MG/ML
0.3 INJECTION, SOLUTION INTRAMUSCULAR; SUBCUTANEOUS EVERY 5 MIN PRN
Status: CANCELLED | OUTPATIENT
Start: 2024-08-01

## 2024-08-01 RX ORDER — ACETAMINOPHEN 325 MG/1
650 TABLET ORAL
Status: CANCELLED | OUTPATIENT
Start: 2024-08-01

## 2024-08-01 RX ORDER — DIPHENHYDRAMINE HCL 25 MG
50 CAPSULE ORAL ONCE
Status: COMPLETED | OUTPATIENT
Start: 2024-08-01 | End: 2024-08-01

## 2024-08-01 RX ORDER — ALBUTEROL SULFATE 90 UG/1
1-2 AEROSOL, METERED RESPIRATORY (INHALATION)
Status: CANCELLED
Start: 2024-08-01

## 2024-08-01 RX ORDER — HEPARIN SODIUM (PORCINE) LOCK FLUSH IV SOLN 100 UNIT/ML 100 UNIT/ML
5 SOLUTION INTRAVENOUS
Status: CANCELLED | OUTPATIENT
Start: 2024-08-01

## 2024-08-01 RX ORDER — DIPHENHYDRAMINE HYDROCHLORIDE 50 MG/ML
50 INJECTION INTRAMUSCULAR; INTRAVENOUS
Status: CANCELLED
Start: 2024-08-01

## 2024-08-01 RX ORDER — LORAZEPAM 2 MG/ML
0.5 INJECTION INTRAMUSCULAR EVERY 4 HOURS PRN
Status: CANCELLED | OUTPATIENT
Start: 2024-08-01

## 2024-08-01 RX ADMIN — DIPHENHYDRAMINE HYDROCHLORIDE 50 MG: 25 CAPSULE ORAL at 09:17

## 2024-08-01 RX ADMIN — SODIUM CHLORIDE 250 ML: 9 INJECTION, SOLUTION INTRAVENOUS at 09:17

## 2024-08-01 RX ADMIN — DEXAMETHASONE SODIUM PHOSPHATE: 10 INJECTION, SOLUTION INTRAMUSCULAR; INTRAVENOUS at 09:17

## 2024-08-01 RX ADMIN — PACLITAXEL 138 MG: 6 INJECTION, SOLUTION INTRAVENOUS at 10:24

## 2024-08-01 RX ADMIN — FAMOTIDINE 20 MG: 10 INJECTION, SOLUTION INTRAVENOUS at 09:17

## 2024-08-01 RX ADMIN — Medication 5 ML: at 11:58

## 2024-08-01 RX ADMIN — SODIUM CHLORIDE 130 MG: 9 INJECTION, SOLUTION INTRAVENOUS at 09:44

## 2024-08-01 ASSESSMENT — PAIN SCALES - GENERAL: PAINLEVEL: NO PAIN (0)

## 2024-08-01 NOTE — NURSING NOTE
"Oncology Rooming Note    August 1, 2024 8:22 AM   Jenni Torres is a 53 year old female who presents for:    Chief Complaint   Patient presents with    Oncology Clinic Visit     Malignant neoplasm of upper-outer quadrant of right breast in female, estrogen receptor positive          Initial Vitals: LMP 05/10/2024 (Exact Date)  Estimated body mass index is 22.56 kg/m  as calculated from the following:    Height as of 7/25/24: 1.699 m (5' 6.89\").    Weight as of 7/25/24: 65.1 kg (143 lb 9.6 oz). There is no height or weight on file to calculate BSA.  Data Unavailable Comment: Data Unavailable   Patient's last menstrual period was 05/10/2024 (exact date).  Allergies reviewed: Yes  Medications reviewed: Yes    Medications: MEDICATION REFILLS NEEDED TODAY. Provider was notified.  Pharmacy name entered into EPIC: [facility] - [place], [place] - [address]    Frailty Screening:   Is the patient here for a new oncology consult visit in cancer care? 2. No      Clinical concerns: Follow up     Patient requesting EMLA cream for port draws.       Delaney Khan MA              "

## 2024-08-01 NOTE — PROGRESS NOTES
Nursing Note:  Jenni Torres presents today for port access and labs.    Patient seen by provider today: Yes: RED Rao   present during visit today: Not Applicable.    Note: N/A.    Intravenous Access:  Lab draw site left AC, Needle type butterfly, Gauge 23.  Labs drawn with some difficulty, had to draw peripherally.  Implanted Port.    Discharge Plan:   Patient was sent to [facility] for PA appointment.    Mandi Weeks RN

## 2024-08-01 NOTE — LETTER
8/1/2024      Jenni Torres  [address]  [place] [address]      Dear Colleague,    Thank you for referring your patient, Jenni Torres, to the [facility]. Please see a copy of my visit note below.    Oncology/Hematology Visit Note    Aug 1, 2024    Reason for visit: Follow up breast cancer    Oncology HPI: Jenni Torres is a 53 year old female with right-sided breast cancer, ER/MS positive, HER2 positive s/p right-sided lumpectomy with sentinel lymph node biopsy.  Pathology with invasive ductal carcinoma, grade 2, measured 1.3 cm in greatest dimension and associated DCIS.  All margins were negative on the invasive tumor, but she did have positive margins on the DCIS portion.  All 3 sentinel lymph nodes were negative.    Dr. Mariee recommended Taxol/Herceptin x 12 weeks, then followed by single agent Herceptin for total x 1 year.  Echo on 7/18/2024 with EF of 60-65%.  Taxol/Herceptin C1 D1 completed on 7/25/2024.    She is here today for Taxol/Herceptin C2 D1.    Interval History: Jenni is here today unaccompanied.  She tolerated cycle 1 last week really well.  She had 2 separate episodes of nausea, better with a dose of Compazine.  No vomiting, appetite has been really good.  She had some intermittent stomach cramping, did not require any meds and then resolved.  No fever, chills, headache, vision changes, rash, itching or leg pain or swelling.    Review of Systems: See interval hx. Denies fevers, chills, HA, dizziness,  changes in vision, CP, SOB, abdominal pain, diarrhea, changes in urination, bleeding, bruising, rash.     PMHx and Social Hx reviewed per EPIC.      Medications:  Current Outpatient Medications   Medication Sig Dispense Refill     dexAMETHasone (DECADRON) 4 MG tablet Take 2 tablets (8 mg) by mouth daily Take 2 tablets (8mg) the evening before chemotherapy infusion and 2 tablets (8mg) the morning of chemotherapy infusion, for first two chemotherapy  "infusions. Take with food. 8 tablet 0     levETIRAcetam (KEPPRA) 500 MG tablet Take 1 tablet (500 mg) by mouth daily 90 tablet 3     levothyroxine (SYNTHROID/LEVOTHROID) 137 MCG tablet Take 1 tablet (137 mcg) by mouth daily 90 tablet 1     Multiple Vitamin (ONE-A-DAY ESSENTIAL) TABS Take 1 tablet by mouth daily       prochlorperazine (COMPAZINE) 10 MG tablet Take 1 tablet (10 mg) by mouth every 6 hours as needed for nausea or vomiting 30 tablet 2     sertraline (ZOLOFT) 50 MG tablet Take 1 tablet (50 mg) by mouth daily 90 tablet 0     valACYclovir (VALTREX) 1000 mg tablet Take 2 tablets (2,000 mg) by mouth 2 times daily 4 tablet 3       No Known Allergies      EXAM:    /68   Pulse 73   Temp 98.4  F (36.9  C) (Temporal)   Resp 18   Ht 1.699 m (5' 6.89\")   Wt 63.8 kg (140 lb 11.2 oz)   LMP 05/10/2024 (Exact Date)   SpO2 97%   BMI 22.11 kg/m      GENERAL:  Female, in no acute distress.  Alert and oriented x3.   HEENT:  Normocephalic, atraumatic.    CV:  RRR, No murmurs, gallops, or rubs.   LUNGS:  Clear to auscultation bilaterally.   BREAST: Well-healing lumpectomy scar on the right side.  ABDOMEN:  Soft, nontender and nondistended.  Bowel sounds heard x4.  No apparent hepatosplenomegaly.   EXTREMITIES:  No clubbing, cyanosis, or edema.   SKIN: No rash, port left side, slight hyperpigmentation, but no erythema or tenderness.   PSYCH: Calm and cooperative       Labs:    08/01/24 08:05   WBC 6.9   Hemoglobin 12.1   Hematocrit 36.7   Platelet Count 211   RBC Count 3.92   MCV 94   MCH 30.9   MCHC 33.0   RDW 12.4   % Neutrophils 83   % Lymphocytes 13   % Monocytes 3   % Eosinophils 0   % Basophils 0   Absolute Basophils 0.0   Absolute Eosinophils 0.0   Absolute Immature Granulocytes 0.1   Absolute Lymphocytes 0.9   Absolute Monocytes 0.2   % Immature Granulocytes 1   Absolute Neutrophils 5.7   Absolute NRBCs 0.0   NRBCs per 100 WBC 0       Imaging: n/a    Impression/Plan: Jenni Torres is a 53 year old " female with right-sided ER/WA positive, HER2 positive breast cancer s/p lumpectomy currently on adjuvant Taxol/Herceptin.    Breast cancer: Invasive ductal carcinoma, grade 2, 1.3 cm with associated DCIS.  All margins were negative on the invasive tumor, but she did have positive margin on the DCIS.  All 3 sentinel lymph nodes negative.  She is also s/p right-sided lumpectomy and this is healing well.  She started adjuvant Taxol Herceptin with C1 D1 completed on 7/25/2024.  She tolerated cycle 1 really well.  Plan for 12 cycles total.  CBC revealed today and looks great,  ok for TH cycle 2 today.  I will see her again next week for cycle 3, then we will have provider visits every other treatment.    Nausea: Secondary to chemotherapy, likely Taxol.  She has Compazine and this is working well for her.  She only has to take it occasionally.      Chart documentation with Dragon Voice recognition Software. Although reviewed after completion, some words and grammatical errors may remain.    20 minutes spent on the date of the encounter doing chart review, review of test results, interpretation of tests, patient visit, and documentation     Love Coates PA-C  Hematology/Oncology  [facility]                  Again, thank you for allowing me to participate in the care of your patient.        Sincerely,        Love Coates PA-C

## 2024-08-01 NOTE — CONFIDENTIAL NOTE
Infusion Nursing Note:  Jenni Torres presents today for Taxol and Herceptin.    Patient seen by provider today: Yes: Love MOON   present during visit today: Not Applicable.    Note:   Premedications given:  Benadryl 50 mg PO  Pepcid 30 mg IV  Zofran/Decadron IV    Patient ices hands and feet during Taxol infusion    Intravenous Access:  Implanted Port.    Treatment Conditions:  Lab Results   Component Value Date    HGB 12.1 08/01/2024    WBC 6.9 08/01/2024    ANEU 5.3 11/22/2018    ANEUTAUTO 5.7 08/01/2024     08/01/2024        Results reviewed, labs MET treatment parameters, ok to proceed with treatment.      Post Infusion Assessment:  Patient tolerated infusion without incident.  Blood return noted pre and post infusion.  Site patent and intact, free from redness, edema or discomfort.  No evidence of extravasations.  Access discontinued per protocol.       Discharge Plan:   AVS to patient via MYCHART.  Patient will return 8/8 for next appointment.   Patient discharged in stable condition accompanied by: self.  Departure Mode: Ambulatory.      Boni Joyce RN

## 2024-08-05 RX ORDER — ACETAMINOPHEN 325 MG/1
650 TABLET ORAL
Status: CANCELLED | OUTPATIENT
Start: 2024-08-08

## 2024-08-05 RX ORDER — DIPHENHYDRAMINE HCL 25 MG
50 CAPSULE ORAL
Status: CANCELLED
Start: 2024-08-08

## 2024-08-05 RX ORDER — HEPARIN SODIUM (PORCINE) LOCK FLUSH IV SOLN 100 UNIT/ML 100 UNIT/ML
5 SOLUTION INTRAVENOUS
Status: CANCELLED | OUTPATIENT
Start: 2024-08-08

## 2024-08-05 RX ORDER — ALBUTEROL SULFATE 0.83 MG/ML
2.5 SOLUTION RESPIRATORY (INHALATION)
Status: CANCELLED | OUTPATIENT
Start: 2024-08-08

## 2024-08-05 RX ORDER — ALBUTEROL SULFATE 90 UG/1
1-2 AEROSOL, METERED RESPIRATORY (INHALATION)
Status: CANCELLED
Start: 2024-08-08

## 2024-08-05 RX ORDER — HEPARIN SODIUM,PORCINE 10 UNIT/ML
5-20 VIAL (ML) INTRAVENOUS DAILY PRN
Status: CANCELLED | OUTPATIENT
Start: 2024-08-08

## 2024-08-05 RX ORDER — DIPHENHYDRAMINE HYDROCHLORIDE 50 MG/ML
50 INJECTION INTRAMUSCULAR; INTRAVENOUS
Status: CANCELLED
Start: 2024-08-08

## 2024-08-05 RX ORDER — ONDANSETRON 2 MG/ML
8 INJECTION INTRAMUSCULAR; INTRAVENOUS ONCE
Status: CANCELLED | OUTPATIENT
Start: 2024-08-08

## 2024-08-05 RX ORDER — LORAZEPAM 2 MG/ML
0.5 INJECTION INTRAMUSCULAR EVERY 4 HOURS PRN
Status: CANCELLED | OUTPATIENT
Start: 2024-08-08

## 2024-08-05 RX ORDER — METHYLPREDNISOLONE SODIUM SUCCINATE 125 MG/2ML
125 INJECTION, POWDER, LYOPHILIZED, FOR SOLUTION INTRAMUSCULAR; INTRAVENOUS
Status: CANCELLED
Start: 2024-08-08

## 2024-08-05 RX ORDER — MEPERIDINE HYDROCHLORIDE 25 MG/ML
25 INJECTION INTRAMUSCULAR; INTRAVENOUS; SUBCUTANEOUS EVERY 30 MIN PRN
Status: CANCELLED | OUTPATIENT
Start: 2024-08-08

## 2024-08-05 RX ORDER — EPINEPHRINE 1 MG/ML
0.3 INJECTION, SOLUTION INTRAMUSCULAR; SUBCUTANEOUS EVERY 5 MIN PRN
Status: CANCELLED | OUTPATIENT
Start: 2024-08-08

## 2024-08-06 NOTE — PROGRESS NOTES
Oncology/Hematology Visit Note    Aug 8, 2024    Reason for visit: Follow up breast cancer    Oncology HPI: Jenni Torres is a 53 year old female with right-sided breast cancer, ER/NE positive, HER2 positive s/p right-sided lumpectomy with sentinel lymph node biopsy.  Pathology with invasive ductal carcinoma, grade 2, measured 1.3 cm in greatest dimension and associated DCIS.  All margins were negative on the invasive tumor, but she did have positive margins on the DCIS portion.  All 3 sentinel lymph nodes were negative.    Dr. Mariee recommended Taxol/Herceptin x 12 weeks, then followed by single agent Herceptin for total x 1 year.  Echo on 7/18/2024 with EF of 60-65%.  Taxol/Herceptin C1 D1 completed on 7/25/2024.    She is here today for Taxol/Herceptin C3D1.    Interval History: Jenni is here today unaccompanied.  She continues to tolerate this regimen pretty well.  She will still have some vision changes in distance only, but not debilitating and she can drive safely.  She has no issues reading close up.  No neuropathy, fever, abnormal bleeding, bowel changes, headaches, vomiting, dizziness.    Review of Systems: See interval hx. Denies fevers, chills, HA, dizziness, CP, SOB, abdominal pain, diarrhea, changes in urination, bruising, rash.     PMHx and Social Hx reviewed per EPIC.      Medications:  Current Outpatient Medications   Medication Sig Dispense Refill    dexAMETHasone (DECADRON) 4 MG tablet Take 2 tablets (8 mg) by mouth daily Take 2 tablets (8mg) the evening before chemotherapy infusion and 2 tablets (8mg) the morning of chemotherapy infusion, for first two chemotherapy infusions. Take with food. 8 tablet 0    levETIRAcetam (KEPPRA) 500 MG tablet Take 1 tablet (500 mg) by mouth daily 90 tablet 3    levothyroxine (SYNTHROID/LEVOTHROID) 137 MCG tablet Take 1 tablet (137 mcg) by mouth daily 90 tablet 1    lidocaine-prilocaine (EMLA) 2.5-2.5 % external cream Apply topically as needed for other (apply  to port site 30 minutes prior to arrival at infusion center) 30 g 1    Multiple Vitamin (ONE-A-DAY ESSENTIAL) TABS Take 1 tablet by mouth daily      prochlorperazine (COMPAZINE) 10 MG tablet Take 1 tablet (10 mg) by mouth every 6 hours as needed for nausea or vomiting 30 tablet 2    sertraline (ZOLOFT) 50 MG tablet Take 1 tablet (50 mg) by mouth daily 90 tablet 0    valACYclovir (VALTREX) 1000 mg tablet Take 2 tablets (2,000 mg) by mouth 2 times daily 4 tablet 3       No Known Allergies      EXAM:    BP 91/59   Pulse 61   Temp 98.2  F (36.8  C) (Tympanic)   Resp 16   Wt 69.1 kg (152 lb 6.4 oz)   LMP 05/10/2024 (Exact Date)   SpO2 98%   BMI 23.95 kg/m      GENERAL:  Female, in no acute distress.  Alert and oriented x3. Well groomed.   HEENT:  Normocephalic, atraumatic. No conjunctival injection or eye swelling.   LUNGS:  Nonlabored breathing, no cough or audible wheezing, able to speak full sentences.  BREAST: Not checked today  MSK: Full ROM UE.    SKIN: No rash on exposed skin.   NEURO: CN grossly intact, speech normal  PSYCH: Mentation appears normal, insight and judgement intact      Labs:    08/08/24 08:03   WBC 3.2 (L)   Hemoglobin 11.8   Hematocrit 36.1   Platelet Count 223   RBC Count 3.82   MCV 95   MCH 30.9   MCHC 32.7   RDW 12.7   % Neutrophils 55   % Lymphocytes 38   % Monocytes 5   % Eosinophils 2   % Basophils 1   Absolute Basophils 0.0   Absolute Eosinophils 0.1   Absolute Immature Granulocytes 0.0   Absolute Lymphocytes 1.2   Absolute Monocytes 0.2   % Immature Granulocytes 0   Absolute Neutrophils 1.7   Absolute NRBCs 0.0   NRBCs per 100 WBC 0     Imaging: n/a    Impression/Plan: Jenni Torres is a 53 year old female with right-sided ER/NV positive, HER2 positive breast cancer s/p lumpectomy currently on adjuvant Taxol/Herceptin.    Breast cancer: Invasive ductal carcinoma, grade 2, 1.3 cm with associated DCIS.  All margins were negative on the invasive tumor, but she did have positive  margin on the DCIS.  All 3 sentinel lymph nodes negative.  She is also s/p right-sided lumpectomy and this is healing well.  She started adjuvant Taxol Herceptin with C1 D1 completed on 7/25/2024.  So far she is tolerating this really well.  Slight leukopenia at 3.0, but ANC normal.  Okay to proceed with cycle 3 today.  Plan for 12 cycles total.  We will start provider visits every 2 to 3 weeks.  With Dr. Mariee's upcoming departure, she will likely establish care with Dr. Andre luz either late September/early October.  I will see her intermittently and she is okay with that.  -8/22 Love, TH cycle 5    Nausea: Secondary to chemotherapy, likely Taxol.  She has Compazine and this is working well for her.  She only has to take it occasionally.      Chart documentation with Dragon Voice recognition Software. Although reviewed after completion, some words and grammatical errors may remain.    20 minutes spent on the date of the encounter doing chart review, review of test results, interpretation of tests, patient visit, and documentation     Love Coates PA-C  Hematology/Oncology  [facility]

## 2024-08-08 ENCOUNTER — INFUSION THERAPY VISIT (OUTPATIENT)
Dept: INFUSION THERAPY | Facility: CLINIC | Age: 54
End: 2024-08-08
Attending: INTERNAL MEDICINE
Payer: COMMERCIAL

## 2024-08-08 ENCOUNTER — ONCOLOGY VISIT (OUTPATIENT)
Dept: ONCOLOGY | Facility: CLINIC | Age: 54
End: 2024-08-08
Attending: INTERNAL MEDICINE
Payer: COMMERCIAL

## 2024-08-08 VITALS
WEIGHT: 152.4 LBS | BODY MASS INDEX: 23.95 KG/M2 | TEMPERATURE: 98.2 F | OXYGEN SATURATION: 98 % | SYSTOLIC BLOOD PRESSURE: 91 MMHG | DIASTOLIC BLOOD PRESSURE: 59 MMHG | RESPIRATION RATE: 16 BRPM | HEART RATE: 61 BPM

## 2024-08-08 DIAGNOSIS — Z17.0 MALIGNANT NEOPLASM OF UPPER-OUTER QUADRANT OF RIGHT BREAST IN FEMALE, ESTROGEN RECEPTOR POSITIVE (H): Primary | ICD-10-CM

## 2024-08-08 DIAGNOSIS — C50.411 MALIGNANT NEOPLASM OF UPPER-OUTER QUADRANT OF RIGHT BREAST IN FEMALE, ESTROGEN RECEPTOR POSITIVE (H): Primary | ICD-10-CM

## 2024-08-08 DIAGNOSIS — R11.0 CHEMOTHERAPY-INDUCED NAUSEA: ICD-10-CM

## 2024-08-08 DIAGNOSIS — T45.1X5A CHEMOTHERAPY-INDUCED NAUSEA: ICD-10-CM

## 2024-08-08 LAB
BASOPHILS # BLD AUTO: 0 10E3/UL (ref 0–0.2)
BASOPHILS NFR BLD AUTO: 1 %
EOSINOPHIL # BLD AUTO: 0.1 10E3/UL (ref 0–0.7)
EOSINOPHIL NFR BLD AUTO: 2 %
ERYTHROCYTE [DISTWIDTH] IN BLOOD BY AUTOMATED COUNT: 12.7 % (ref 10–15)
HCT VFR BLD AUTO: 36.1 % (ref 35–47)
HGB BLD-MCNC: 11.8 G/DL (ref 11.7–15.7)
IMM GRANULOCYTES # BLD: 0 10E3/UL
IMM GRANULOCYTES NFR BLD: 0 %
LYMPHOCYTES # BLD AUTO: 1.2 10E3/UL (ref 0.8–5.3)
LYMPHOCYTES NFR BLD AUTO: 38 %
MCH RBC QN AUTO: 30.9 PG (ref 26.5–33)
MCHC RBC AUTO-ENTMCNC: 32.7 G/DL (ref 31.5–36.5)
MCV RBC AUTO: 95 FL (ref 78–100)
MONOCYTES # BLD AUTO: 0.2 10E3/UL (ref 0–1.3)
MONOCYTES NFR BLD AUTO: 5 %
NEUTROPHILS # BLD AUTO: 1.7 10E3/UL (ref 1.6–8.3)
NEUTROPHILS NFR BLD AUTO: 55 %
NRBC # BLD AUTO: 0 10E3/UL
NRBC BLD AUTO-RTO: 0 /100
PLATELET # BLD AUTO: 223 10E3/UL (ref 150–450)
RBC # BLD AUTO: 3.82 10E6/UL (ref 3.8–5.2)
WBC # BLD AUTO: 3.2 10E3/UL (ref 4–11)

## 2024-08-08 PROCEDURE — 99213 OFFICE O/P EST LOW 20 MIN: CPT | Mod: 24 | Performed by: PHYSICIAN ASSISTANT

## 2024-08-08 PROCEDURE — 250N000011 HC RX IP 250 OP 636: Performed by: INTERNAL MEDICINE

## 2024-08-08 PROCEDURE — 99213 OFFICE O/P EST LOW 20 MIN: CPT | Performed by: PHYSICIAN ASSISTANT

## 2024-08-08 PROCEDURE — 258N000003 HC RX IP 258 OP 636: Performed by: INTERNAL MEDICINE

## 2024-08-08 PROCEDURE — 96413 CHEMO IV INFUSION 1 HR: CPT

## 2024-08-08 PROCEDURE — 85004 AUTOMATED DIFF WBC COUNT: CPT | Performed by: INTERNAL MEDICINE

## 2024-08-08 PROCEDURE — 96417 CHEMO IV INFUS EACH ADDL SEQ: CPT

## 2024-08-08 PROCEDURE — 36591 DRAW BLOOD OFF VENOUS DEVICE: CPT | Performed by: INTERNAL MEDICINE

## 2024-08-08 PROCEDURE — 96375 TX/PRO/DX INJ NEW DRUG ADDON: CPT

## 2024-08-08 RX ORDER — ACETAMINOPHEN 325 MG/1
650 TABLET ORAL
Status: CANCELLED | OUTPATIENT
Start: 2024-08-15

## 2024-08-08 RX ORDER — DIPHENHYDRAMINE HCL 25 MG
50 CAPSULE ORAL
Status: CANCELLED
Start: 2024-08-15

## 2024-08-08 RX ORDER — ONDANSETRON 2 MG/ML
8 INJECTION INTRAMUSCULAR; INTRAVENOUS ONCE
Status: COMPLETED | OUTPATIENT
Start: 2024-08-08 | End: 2024-08-08

## 2024-08-08 RX ORDER — ALBUTEROL SULFATE 0.83 MG/ML
2.5 SOLUTION RESPIRATORY (INHALATION)
Status: CANCELLED | OUTPATIENT
Start: 2024-08-15

## 2024-08-08 RX ORDER — HEPARIN SODIUM (PORCINE) LOCK FLUSH IV SOLN 100 UNIT/ML 100 UNIT/ML
5 SOLUTION INTRAVENOUS
Status: CANCELLED | OUTPATIENT
Start: 2024-08-15

## 2024-08-08 RX ORDER — HEPARIN SODIUM,PORCINE 10 UNIT/ML
5-20 VIAL (ML) INTRAVENOUS DAILY PRN
Status: CANCELLED | OUTPATIENT
Start: 2024-08-15

## 2024-08-08 RX ORDER — ONDANSETRON 2 MG/ML
8 INJECTION INTRAMUSCULAR; INTRAVENOUS ONCE
Status: CANCELLED | OUTPATIENT
Start: 2024-08-15

## 2024-08-08 RX ORDER — LORAZEPAM 2 MG/ML
0.5 INJECTION INTRAMUSCULAR EVERY 4 HOURS PRN
Status: CANCELLED | OUTPATIENT
Start: 2024-08-15

## 2024-08-08 RX ORDER — DIPHENHYDRAMINE HYDROCHLORIDE 50 MG/ML
50 INJECTION INTRAMUSCULAR; INTRAVENOUS
Status: CANCELLED
Start: 2024-08-15

## 2024-08-08 RX ORDER — EPINEPHRINE 1 MG/ML
0.3 INJECTION, SOLUTION INTRAMUSCULAR; SUBCUTANEOUS EVERY 5 MIN PRN
Status: CANCELLED | OUTPATIENT
Start: 2024-08-15

## 2024-08-08 RX ORDER — METHYLPREDNISOLONE SODIUM SUCCINATE 125 MG/2ML
125 INJECTION, POWDER, LYOPHILIZED, FOR SOLUTION INTRAMUSCULAR; INTRAVENOUS
Status: CANCELLED
Start: 2024-08-15

## 2024-08-08 RX ORDER — MEPERIDINE HYDROCHLORIDE 25 MG/ML
25 INJECTION INTRAMUSCULAR; INTRAVENOUS; SUBCUTANEOUS EVERY 30 MIN PRN
Status: CANCELLED | OUTPATIENT
Start: 2024-08-15

## 2024-08-08 RX ORDER — HEPARIN SODIUM (PORCINE) LOCK FLUSH IV SOLN 100 UNIT/ML 100 UNIT/ML
5 SOLUTION INTRAVENOUS
Status: DISCONTINUED | OUTPATIENT
Start: 2024-08-08 | End: 2024-08-08 | Stop reason: HOSPADM

## 2024-08-08 RX ORDER — ALBUTEROL SULFATE 90 UG/1
1-2 AEROSOL, METERED RESPIRATORY (INHALATION)
Status: CANCELLED
Start: 2024-08-15

## 2024-08-08 RX ADMIN — ONDANSETRON 8 MG: 2 INJECTION INTRAMUSCULAR; INTRAVENOUS at 09:12

## 2024-08-08 RX ADMIN — Medication 5 ML: at 11:10

## 2024-08-08 RX ADMIN — SODIUM CHLORIDE 130 MG: 9 INJECTION, SOLUTION INTRAVENOUS at 09:31

## 2024-08-08 RX ADMIN — SODIUM CHLORIDE 250 ML: 9 INJECTION, SOLUTION INTRAVENOUS at 09:12

## 2024-08-08 RX ADMIN — PACLITAXEL 138 MG: 6 INJECTION, SOLUTION INTRAVENOUS at 10:05

## 2024-08-08 ASSESSMENT — PAIN SCALES - GENERAL: PAINLEVEL: NO PAIN (0)

## 2024-08-08 NOTE — LETTER
8/8/2024      Jenni Torres  [address]  [place] [address]      Dear Colleague,    Thank you for referring your patient, Jenni Torres, to the [facility]. Please see a copy of my visit note below.    Oncology/Hematology Visit Note    Aug 8, 2024    Reason for visit: Follow up breast cancer    Oncology HPI: Jenni Torres is a 53 year old female with right-sided breast cancer, ER/NM positive, HER2 positive s/p right-sided lumpectomy with sentinel lymph node biopsy.  Pathology with invasive ductal carcinoma, grade 2, measured 1.3 cm in greatest dimension and associated DCIS.  All margins were negative on the invasive tumor, but she did have positive margins on the DCIS portion.  All 3 sentinel lymph nodes were negative.    Dr. Mariee recommended Taxol/Herceptin x 12 weeks, then followed by single agent Herceptin for total x 1 year.  Echo on 7/18/2024 with EF of 60-65%.  Taxol/Herceptin C1 D1 completed on 7/25/2024.    She is here today for Taxol/Herceptin C3D1.    Interval History: Jenni is here today unaccompanied.  She continues to tolerate this regimen pretty well.  She will still have some vision changes in distance only, but not debilitating and she can drive safely.  She has no issues reading close up.  No neuropathy, fever, abnormal bleeding, bowel changes, headaches, vomiting, dizziness.    Review of Systems: See interval hx. Denies fevers, chills, HA, dizziness, CP, SOB, abdominal pain, diarrhea, changes in urination, bruising, rash.     PMHx and Social Hx reviewed per EPIC.      Medications:  Current Outpatient Medications   Medication Sig Dispense Refill     dexAMETHasone (DECADRON) 4 MG tablet Take 2 tablets (8 mg) by mouth daily Take 2 tablets (8mg) the evening before chemotherapy infusion and 2 tablets (8mg) the morning of chemotherapy infusion, for first two chemotherapy infusions. Take with food. 8 tablet 0     levETIRAcetam (KEPPRA) 500 MG tablet Take 1  tablet (500 mg) by mouth daily 90 tablet 3     levothyroxine (SYNTHROID/LEVOTHROID) 137 MCG tablet Take 1 tablet (137 mcg) by mouth daily 90 tablet 1     lidocaine-prilocaine (EMLA) 2.5-2.5 % external cream Apply topically as needed for other (apply to port site 30 minutes prior to arrival at infusion center) 30 g 1     Multiple Vitamin (ONE-A-DAY ESSENTIAL) TABS Take 1 tablet by mouth daily       prochlorperazine (COMPAZINE) 10 MG tablet Take 1 tablet (10 mg) by mouth every 6 hours as needed for nausea or vomiting 30 tablet 2     sertraline (ZOLOFT) 50 MG tablet Take 1 tablet (50 mg) by mouth daily 90 tablet 0     valACYclovir (VALTREX) 1000 mg tablet Take 2 tablets (2,000 mg) by mouth 2 times daily 4 tablet 3       No Known Allergies      EXAM:    BP 91/59   Pulse 61   Temp 98.2  F (36.8  C) (Tympanic)   Resp 16   Wt 69.1 kg (152 lb 6.4 oz)   LMP 05/10/2024 (Exact Date)   SpO2 98%   BMI 23.95 kg/m      GENERAL:  Female, in no acute distress.  Alert and oriented x3. Well groomed.   HEENT:  Normocephalic, atraumatic. No conjunctival injection or eye swelling.   LUNGS:  Nonlabored breathing, no cough or audible wheezing, able to speak full sentences.  BREAST: Not checked today  MSK: Full ROM UE.    SKIN: No rash on exposed skin.   NEURO: CN grossly intact, speech normal  PSYCH: Mentation appears normal, insight and judgement intact      Labs:    08/08/24 08:03   WBC 3.2 (L)   Hemoglobin 11.8   Hematocrit 36.1   Platelet Count 223   RBC Count 3.82   MCV 95   MCH 30.9   MCHC 32.7   RDW 12.7   % Neutrophils 55   % Lymphocytes 38   % Monocytes 5   % Eosinophils 2   % Basophils 1   Absolute Basophils 0.0   Absolute Eosinophils 0.1   Absolute Immature Granulocytes 0.0   Absolute Lymphocytes 1.2   Absolute Monocytes 0.2   % Immature Granulocytes 0   Absolute Neutrophils 1.7   Absolute NRBCs 0.0   NRBCs per 100 WBC 0     Imaging: n/a    Impression/Plan: Jenni Torres is a 53 year old female with right-sided ER/RI  positive, HER2 positive breast cancer s/p lumpectomy currently on adjuvant Taxol/Herceptin.    Breast cancer: Invasive ductal carcinoma, grade 2, 1.3 cm with associated DCIS.  All margins were negative on the invasive tumor, but she did have positive margin on the DCIS.  All 3 sentinel lymph nodes negative.  She is also s/p right-sided lumpectomy and this is healing well.  She started adjuvant Taxol Herceptin with C1 D1 completed on 7/25/2024.  So far she is tolerating this really well.  Slight leukopenia at 3.0, but ANC normal.  Okay to proceed with cycle 3 today.  Plan for 12 cycles total.  We will start provider visits every 2 to 3 weeks.  With Dr. Mariee's upcoming departure, she will likely establish care with Dr. Andre luz either late September/early October.  I will see her intermittently and she is okay with that.  -8/22 Love, TH cycle 5    Nausea: Secondary to chemotherapy, likely Taxol.  She has Compazine and this is working well for her.  She only has to take it occasionally.      Chart documentation with Dragon Voice recognition Software. Although reviewed after completion, some words and grammatical errors may remain.    20 minutes spent on the date of the encounter doing chart review, review of test results, interpretation of tests, patient visit, and documentation     Love Coates PA-C  Hematology/Oncology  [facility]                  Again, thank you for allowing me to participate in the care of your patient.        Sincerely,        Love Coates PA-C

## 2024-08-08 NOTE — NURSING NOTE
"Oncology Rooming Note    August 8, 2024 8:18 AM   Jenni Torres is a 53 year old female who presents for:    Chief Complaint   Patient presents with    Oncology Clinic Visit     Initial Vitals: BP 91/59   Pulse 61   Temp 98.2  F (36.8  C) (Tympanic)   Resp 16   Wt 69.1 kg (152 lb 6.4 oz)   LMP 05/10/2024 (Exact Date)   SpO2 98%   BMI 23.95 kg/m   Estimated body mass index is 23.95 kg/m  as calculated from the following:    Height as of 8/1/24: 1.699 m (5' 6.89\").    Weight as of this encounter: 69.1 kg (152 lb 6.4 oz). Body surface area is 1.81 meters squared.  No Pain (0) Comment: Data Unavailable   Patient's last menstrual period was 05/10/2024 (exact date).  Allergies reviewed: Yes  Medications reviewed: Yes    Medications: Medication refills not needed today.  Pharmacy name entered into EPIC: [facility] - [place], [place] - [address]    Frailty Screening:   Is the patient here for a new oncology consult visit in cancer care? 2. No      Clinical concerns: f/u       Dionna Jackson CMA              "

## 2024-08-08 NOTE — PROGRESS NOTES
Infusion Nursing Note:  Jenni Torres presents today for Cycle 3 Day 1 Trastuzumab+Taxol.    Patient seen by provider today: Yes: RED Stone   present during visit today: Not Applicable.    Note: Jenni is feeling well, no new concerns or symptoms.     Ices hands and feet during Taxol infusions.    Intravenous Access:  Labs drawn without difficulty by FT RN.  Implanted Port.  **Use [id number] DRESSING**    Treatment Conditions:  Lab Results   Component Value Date    HGB 11.8 08/08/2024    WBC 3.2 (L) 08/08/2024    ANEU 5.3 11/22/2018    ANEUTAUTO 1.7 08/08/2024     08/08/2024     Lab Results   Component Value Date     06/19/2024    POTASSIUM 3.9 06/19/2024    CR 0.89 06/19/2024    LEDA 9.1 06/19/2024    BILITOTAL 0.3 07/24/2024    ALBUMIN 4.0 07/24/2024    ALT 8 07/24/2024    AST 16 07/24/2024     Results reviewed, labs MET treatment parameters, ok to proceed with treatment.    Post Infusion Assessment:  Patient tolerated infusion without incident.  Blood return noted pre and post infusion.  Site patent and intact, free from redness, edema or discomfort.  No evidence of extravasations.  Access discontinued per protocol.     Discharge Plan:   Discharge instructions reviewed with: Patient.  Patient and/or family verbalized understanding of discharge instructions and all questions answered.  AVS to patient via [employer]ype[employer]ART.  Patient will return 8/15 for next appointment.   Patient discharged in stable condition accompanied by: self.  Departure Mode: Ambulatory.    Jann Inman RN

## 2024-08-08 NOTE — PROGRESS NOTES
Nursing Note:  Jenni Torres presents today for port access and labs.    Patient seen by provider today: Yes: RED Rao   present during visit today: Not Applicable.    Note: N/A.    Intravenous Access:  Labs drawn without difficulty.  Implanted Port.    Discharge Plan:   Patient was sent to [facility] for PA appointment.    Mandi Weeks RN

## 2024-08-13 DIAGNOSIS — F33.0 MILD EPISODE OF RECURRENT MAJOR DEPRESSIVE DISORDER (H): ICD-10-CM

## 2024-08-15 ENCOUNTER — TELEPHONE (OUTPATIENT)
Dept: ONCOLOGY | Facility: CLINIC | Age: 54
End: 2024-08-15

## 2024-08-15 ENCOUNTER — INFUSION THERAPY VISIT (OUTPATIENT)
Dept: INFUSION THERAPY | Facility: CLINIC | Age: 54
End: 2024-08-15
Attending: INTERNAL MEDICINE
Payer: COMMERCIAL

## 2024-08-15 VITALS
TEMPERATURE: 98.6 F | HEART RATE: 60 BPM | RESPIRATION RATE: 16 BRPM | OXYGEN SATURATION: 97 % | WEIGHT: 144.6 LBS | BODY MASS INDEX: 22.72 KG/M2 | DIASTOLIC BLOOD PRESSURE: 62 MMHG | SYSTOLIC BLOOD PRESSURE: 93 MMHG

## 2024-08-15 DIAGNOSIS — Z17.0 MALIGNANT NEOPLASM OF UPPER-OUTER QUADRANT OF RIGHT BREAST IN FEMALE, ESTROGEN RECEPTOR POSITIVE (H): Primary | ICD-10-CM

## 2024-08-15 DIAGNOSIS — C50.411 MALIGNANT NEOPLASM OF UPPER-OUTER QUADRANT OF RIGHT BREAST IN FEMALE, ESTROGEN RECEPTOR POSITIVE (H): Primary | ICD-10-CM

## 2024-08-15 LAB
ALBUMIN SERPL BCG-MCNC: 4.2 G/DL (ref 3.5–5.2)
ALP SERPL-CCNC: 49 U/L (ref 40–150)
ALT SERPL W P-5'-P-CCNC: 14 U/L (ref 0–50)
AST SERPL W P-5'-P-CCNC: 19 U/L (ref 0–45)
BASOPHILS # BLD AUTO: 0.1 10E3/UL (ref 0–0.2)
BASOPHILS NFR BLD AUTO: 2 %
BILIRUB DIRECT SERPL-MCNC: <0.2 MG/DL (ref 0–0.3)
BILIRUB SERPL-MCNC: 0.2 MG/DL
EOSINOPHIL # BLD AUTO: 0.1 10E3/UL (ref 0–0.7)
EOSINOPHIL NFR BLD AUTO: 2 %
ERYTHROCYTE [DISTWIDTH] IN BLOOD BY AUTOMATED COUNT: 12.7 % (ref 10–15)
HCT VFR BLD AUTO: 35.4 % (ref 35–47)
HGB BLD-MCNC: 11.4 G/DL (ref 11.7–15.7)
IMM GRANULOCYTES # BLD: 0 10E3/UL
IMM GRANULOCYTES NFR BLD: 1 %
LYMPHOCYTES # BLD AUTO: 1.4 10E3/UL (ref 0.8–5.3)
LYMPHOCYTES NFR BLD AUTO: 41 %
MCH RBC QN AUTO: 30.4 PG (ref 26.5–33)
MCHC RBC AUTO-ENTMCNC: 32.2 G/DL (ref 31.5–36.5)
MCV RBC AUTO: 94 FL (ref 78–100)
MONOCYTES # BLD AUTO: 0.3 10E3/UL (ref 0–1.3)
MONOCYTES NFR BLD AUTO: 7 %
NEUTROPHILS # BLD AUTO: 1.7 10E3/UL (ref 1.6–8.3)
NEUTROPHILS NFR BLD AUTO: 48 %
NRBC # BLD AUTO: 0 10E3/UL
NRBC BLD AUTO-RTO: 0 /100
PLATELET # BLD AUTO: 263 10E3/UL (ref 150–450)
PROT SERPL-MCNC: 6.4 G/DL (ref 6.4–8.3)
RBC # BLD AUTO: 3.75 10E6/UL (ref 3.8–5.2)
WBC # BLD AUTO: 3.4 10E3/UL (ref 4–11)

## 2024-08-15 PROCEDURE — 96417 CHEMO IV INFUS EACH ADDL SEQ: CPT

## 2024-08-15 PROCEDURE — 85025 COMPLETE CBC W/AUTO DIFF WBC: CPT | Performed by: PHYSICIAN ASSISTANT

## 2024-08-15 PROCEDURE — 96375 TX/PRO/DX INJ NEW DRUG ADDON: CPT

## 2024-08-15 PROCEDURE — 250N000011 HC RX IP 250 OP 636: Performed by: PHYSICIAN ASSISTANT

## 2024-08-15 PROCEDURE — 80076 HEPATIC FUNCTION PANEL: CPT | Performed by: PHYSICIAN ASSISTANT

## 2024-08-15 PROCEDURE — 36591 DRAW BLOOD OFF VENOUS DEVICE: CPT | Performed by: PHYSICIAN ASSISTANT

## 2024-08-15 PROCEDURE — 258N000003 HC RX IP 258 OP 636: Performed by: PHYSICIAN ASSISTANT

## 2024-08-15 PROCEDURE — 96413 CHEMO IV INFUSION 1 HR: CPT

## 2024-08-15 RX ORDER — ONDANSETRON 2 MG/ML
8 INJECTION INTRAMUSCULAR; INTRAVENOUS ONCE
Status: COMPLETED | OUTPATIENT
Start: 2024-08-15 | End: 2024-08-15

## 2024-08-15 RX ORDER — HEPARIN SODIUM (PORCINE) LOCK FLUSH IV SOLN 100 UNIT/ML 100 UNIT/ML
5 SOLUTION INTRAVENOUS
Status: DISCONTINUED | OUTPATIENT
Start: 2024-08-15 | End: 2024-08-15 | Stop reason: HOSPADM

## 2024-08-15 RX ADMIN — SODIUM CHLORIDE 130 MG: 9 INJECTION, SOLUTION INTRAVENOUS at 11:45

## 2024-08-15 RX ADMIN — Medication 5 ML: at 13:32

## 2024-08-15 RX ADMIN — SODIUM CHLORIDE 250 ML: 9 INJECTION, SOLUTION INTRAVENOUS at 11:34

## 2024-08-15 RX ADMIN — ONDANSETRON 8 MG: 2 INJECTION INTRAMUSCULAR; INTRAVENOUS at 11:33

## 2024-08-15 RX ADMIN — PACLITAXEL 138 MG: 6 INJECTION, SOLUTION INTRAVENOUS at 12:27

## 2024-08-15 ASSESSMENT — PAIN SCALES - GENERAL: PAINLEVEL: NO PAIN (0)

## 2024-08-15 NOTE — PROGRESS NOTES
Infusion Nursing Note:  Jenni Torres presents today for C4D1 Trastuzumab, Taxol.    Patient seen by provider today: No   present during visit today: Not Applicable.    Note: Jenni notes trouble sleeping, particularly the past four nights. She does not take anything to help with sleep, and is not on any steroids. Discussed with Love MOON:  OK to start Melatonin to see if it will help. Check on starting dose with pharmacy.  TORB Love MOON/ Khang REID    Per discussion with pharmacist Aristeo LANZA:  Starting dose for patients new to melatonin would be 5mg; maximum dose if that is ineffective would be 10mg.    Iced hands and feet during Taxol infusion.    Intravenous Access:  Labs drawn without difficulty.  Implanted Port.    Treatment Conditions:  Lab Results   Component Value Date    HGB 11.4 (L) 08/15/2024    WBC 3.4 (L) 08/15/2024    ANEU 5.3 11/22/2018    ANEUTAUTO 1.7 08/15/2024     08/15/2024        Results reviewed, labs MET treatment parameters, ok to proceed with treatment.    Post Infusion Assessment:  Patient tolerated infusion without incident.  Blood return noted pre and post infusion.  Site patent and intact, free from redness, edema or discomfort.  No evidence of extravasations.  Access discontinued per protocol.     Discharge Plan:   [facility]  Patient will return 8/22 for next appointment.   Patient discharged in stable condition accompanied by: self.  Departure Mode: Ambulatory.    Joy Garzon RN

## 2024-08-15 NOTE — TELEPHONE ENCOUNTER
Pt completed Care companion questionnaire and reported the following symptoms: occasional mild pain and difficulty sleeping.     Pt states that she was already at the infusion clinic today and she discussed her concerns with the team at the infusion clinic.  All of her questions have been answered and she has no further concerns at this time.  She will continue to monitor her symptoms and will contact the clinic with any new or worsening symptoms.  Patient verbalized understanding and agreement with plan.  Patient was instructed to call the clinic with any questions, concerns, or worsening symptoms.     Cherri Keller RN on 8/15/2024 at 3:20 PM

## 2024-08-21 NOTE — PROGRESS NOTES
Oncology/Hematology Visit Note    Aug 22, 2024    Reason for visit: Follow up breast cancer    Oncology HPI: Jenni Torres is a 53 year old female with right-sided breast cancer, ER/OH positive, HER2 positive s/p right-sided lumpectomy with sentinel lymph node biopsy.  Pathology with invasive ductal carcinoma, grade 2, measured 1.3 cm in greatest dimension and associated DCIS.  All margins were negative on the invasive tumor, but she did have positive margins on the DCIS portion.  All 3 sentinel lymph nodes were negative.    Dr. Mariee recommended Taxol/Herceptin x 12 weeks, then followed by single agent Herceptin for total x 1 year.  Echo on 7/18/2024 with EF of 60-65%.  Taxol/Herceptin C1 D1 completed on 7/25/2024.    She is here today for Taxol/Herceptin C5D1.    Interval History: Jenni is here today unaccompanied.  She has been tolerating TH pretty well.  She was started on melatonin 5 mg at night and this has helped her insomnia.  No neuropathy, fever or chills, vomiting or diarrhea, bleeding.  No other new complaints.    Review of Systems: See interval hx. Denies fevers, chills, HA, dizziness, CP, SOB, abdominal pain, diarrhea, changes in urination, bruising, rash.     PMHx and Social Hx reviewed per EPIC.      Medications:  Current Outpatient Medications   Medication Sig Dispense Refill    levETIRAcetam (KEPPRA) 500 MG tablet Take 1 tablet (500 mg) by mouth daily 90 tablet 3    levothyroxine (SYNTHROID/LEVOTHROID) 137 MCG tablet Take 1 tablet (137 mcg) by mouth daily 90 tablet 1    lidocaine-prilocaine (EMLA) 2.5-2.5 % external cream Apply topically as needed for other (apply to port site 30 minutes prior to arrival at infusion center) 30 g 1    Multiple Vitamin (ONE-A-DAY ESSENTIAL) TABS Take 1 tablet by mouth daily      prochlorperazine (COMPAZINE) 10 MG tablet Take 1 tablet (10 mg) by mouth every 6 hours as needed for nausea or vomiting 30 tablet 2    sertraline (ZOLOFT) 50 MG tablet Take 1 tablet by  "mouth daily. 90 tablet 3    valACYclovir (VALTREX) 1000 mg tablet Take 2 tablets (2,000 mg) by mouth 2 times daily 4 tablet 3    dexAMETHasone (DECADRON) 4 MG tablet Take 2 tablets (8 mg) by mouth daily Take 2 tablets (8mg) the evening before chemotherapy infusion and 2 tablets (8mg) the morning of chemotherapy infusion, for first two chemotherapy infusions. Take with food. (Patient not taking: Reported on 8/22/2024) 8 tablet 0       No Known Allergies      EXAM:    /64   Pulse 64   Temp 97.9  F (36.6  C) (Temporal)   Resp 18   Ht 1.699 m (5' 6.89\")   Wt 65.3 kg (144 lb)   SpO2 99%   BMI 22.63 kg/m      GENERAL:  Female, in no acute distress.  Alert and oriented x3. Well groomed.   HEENT:  Normocephalic, atraumatic. No conjunctival injection or eye swelling.   LUNGS:  Nonlabored breathing, CTA  BREAST: Not checked today  MSK: Full ROM UE.    SKIN: No rash on exposed skin.   NEURO: CN grossly intact, speech normal  PSYCH: Mentation appears normal, insight and judgement intact      Labs:    08/22/24 10:39   WBC 4.6   Hemoglobin 11.5 (L)   Hematocrit 35.5   Platelet Count 268   RBC Count 3.77 (L)   MCV 94   MCH 30.5   MCHC 32.4   RDW 13.1   % Neutrophils 60   % Lymphocytes 30   % Monocytes 6   % Eosinophils 1   % Basophils 1   Absolute Basophils 0.1   Absolute Eosinophils 0.1   Absolute Immature Granulocytes 0.0   Absolute Lymphocytes 1.4   Absolute Monocytes 0.3   % Immature Granulocytes 1   Absolute Neutrophils 2.8   Absolute NRBCs 0.0   NRBCs per 100 WBC 0     Imaging: n/a    Impression/Plan: Jenni Torres is a 53 year old female with right-sided ER/KS positive, HER2 positive breast cancer s/p lumpectomy currently on adjuvant Taxol/Herceptin.    Breast cancer: Invasive ductal carcinoma, grade 2, 1.3 cm with associated DCIS.  All margins were negative on the invasive tumor, but she did have positive margin on the DCIS.  All 3 sentinel lymph nodes negative.  She is also s/p right-sided lumpectomy and " this is healing well.  She started adjuvant Taxol Herceptin with C1 D1 completed on 7/25/2024.  She is tolerating this really well.  Slight anemia at 11.5, but WBC/ANC look great and platelets are normal.  Continue treatment weekly, provider visits every 2 - 3 weeks.  With Dr. Mariee's upcoming departure, she will likely establish care with Dr. Tomas in late Sept.   -9/12 Love, TH cycle 8    Nausea: Secondary to chemotherapy, likely Taxol.  She has Compazine and this is working well for her.  She only has to take it occasionally.    Insomnia: New in the last few weeks, started melatonin 5 mg at bedtime with improvement.  She will continue this.  If melatonin becomes less effective, we will discuss trazodone.      Chart documentation with Dragon Voice recognition Software. Although reviewed after completion, some words and grammatical errors may remain.    20 minutes spent on the date of the encounter doing chart review, review of test results, interpretation of tests, patient visit, and documentation     Love Coates PA-C  Hematology/Oncology  [facility]

## 2024-08-22 ENCOUNTER — INFUSION THERAPY VISIT (OUTPATIENT)
Dept: INFUSION THERAPY | Facility: CLINIC | Age: 54
End: 2024-08-22
Attending: INTERNAL MEDICINE
Payer: COMMERCIAL

## 2024-08-22 ENCOUNTER — MYC MEDICAL ADVICE (OUTPATIENT)
Dept: ONCOLOGY | Facility: CLINIC | Age: 54
End: 2024-08-22

## 2024-08-22 ENCOUNTER — LAB (OUTPATIENT)
Dept: INFUSION THERAPY | Facility: CLINIC | Age: 54
End: 2024-08-22
Attending: PHYSICIAN ASSISTANT
Payer: COMMERCIAL

## 2024-08-22 ENCOUNTER — ONCOLOGY VISIT (OUTPATIENT)
Dept: ONCOLOGY | Facility: CLINIC | Age: 54
End: 2024-08-22
Attending: PHYSICIAN ASSISTANT
Payer: COMMERCIAL

## 2024-08-22 VITALS
TEMPERATURE: 97.9 F | HEIGHT: 67 IN | WEIGHT: 144 LBS | BODY MASS INDEX: 22.6 KG/M2 | OXYGEN SATURATION: 99 % | HEART RATE: 64 BPM | RESPIRATION RATE: 18 BRPM | DIASTOLIC BLOOD PRESSURE: 64 MMHG | SYSTOLIC BLOOD PRESSURE: 102 MMHG

## 2024-08-22 DIAGNOSIS — Z17.0 MALIGNANT NEOPLASM OF UPPER-OUTER QUADRANT OF RIGHT BREAST IN FEMALE, ESTROGEN RECEPTOR POSITIVE (H): Primary | ICD-10-CM

## 2024-08-22 DIAGNOSIS — C50.411 MALIGNANT NEOPLASM OF UPPER-OUTER QUADRANT OF RIGHT BREAST IN FEMALE, ESTROGEN RECEPTOR POSITIVE (H): Primary | ICD-10-CM

## 2024-08-22 DIAGNOSIS — G47.00 INSOMNIA, UNSPECIFIED TYPE: ICD-10-CM

## 2024-08-22 LAB
BASOPHILS # BLD AUTO: 0.1 10E3/UL (ref 0–0.2)
BASOPHILS NFR BLD AUTO: 1 %
EOSINOPHIL # BLD AUTO: 0.1 10E3/UL (ref 0–0.7)
EOSINOPHIL NFR BLD AUTO: 1 %
ERYTHROCYTE [DISTWIDTH] IN BLOOD BY AUTOMATED COUNT: 13.1 % (ref 10–15)
HCT VFR BLD AUTO: 35.5 % (ref 35–47)
HGB BLD-MCNC: 11.5 G/DL (ref 11.7–15.7)
IMM GRANULOCYTES # BLD: 0 10E3/UL
IMM GRANULOCYTES NFR BLD: 1 %
LYMPHOCYTES # BLD AUTO: 1.4 10E3/UL (ref 0.8–5.3)
LYMPHOCYTES NFR BLD AUTO: 30 %
MCH RBC QN AUTO: 30.5 PG (ref 26.5–33)
MCHC RBC AUTO-ENTMCNC: 32.4 G/DL (ref 31.5–36.5)
MCV RBC AUTO: 94 FL (ref 78–100)
MONOCYTES # BLD AUTO: 0.3 10E3/UL (ref 0–1.3)
MONOCYTES NFR BLD AUTO: 6 %
NEUTROPHILS # BLD AUTO: 2.8 10E3/UL (ref 1.6–8.3)
NEUTROPHILS NFR BLD AUTO: 60 %
NRBC # BLD AUTO: 0 10E3/UL
NRBC BLD AUTO-RTO: 0 /100
PLATELET # BLD AUTO: 268 10E3/UL (ref 150–450)
RBC # BLD AUTO: 3.77 10E6/UL (ref 3.8–5.2)
WBC # BLD AUTO: 4.6 10E3/UL (ref 4–11)

## 2024-08-22 PROCEDURE — 96375 TX/PRO/DX INJ NEW DRUG ADDON: CPT

## 2024-08-22 PROCEDURE — 250N000011 HC RX IP 250 OP 636: Performed by: PHYSICIAN ASSISTANT

## 2024-08-22 PROCEDURE — 96417 CHEMO IV INFUS EACH ADDL SEQ: CPT

## 2024-08-22 PROCEDURE — 36591 DRAW BLOOD OFF VENOUS DEVICE: CPT | Performed by: PHYSICIAN ASSISTANT

## 2024-08-22 PROCEDURE — 99213 OFFICE O/P EST LOW 20 MIN: CPT | Performed by: PHYSICIAN ASSISTANT

## 2024-08-22 PROCEDURE — 96413 CHEMO IV INFUSION 1 HR: CPT

## 2024-08-22 PROCEDURE — 258N000003 HC RX IP 258 OP 636: Performed by: PHYSICIAN ASSISTANT

## 2024-08-22 PROCEDURE — 85025 COMPLETE CBC W/AUTO DIFF WBC: CPT | Performed by: PHYSICIAN ASSISTANT

## 2024-08-22 RX ORDER — LORAZEPAM 2 MG/ML
0.5 INJECTION INTRAMUSCULAR EVERY 4 HOURS PRN
Status: CANCELLED | OUTPATIENT
Start: 2024-08-29

## 2024-08-22 RX ORDER — ONDANSETRON 2 MG/ML
8 INJECTION INTRAMUSCULAR; INTRAVENOUS ONCE
Status: CANCELLED | OUTPATIENT
Start: 2024-09-05

## 2024-08-22 RX ORDER — HEPARIN SODIUM (PORCINE) LOCK FLUSH IV SOLN 100 UNIT/ML 100 UNIT/ML
5 SOLUTION INTRAVENOUS
Status: CANCELLED | OUTPATIENT
Start: 2024-08-22

## 2024-08-22 RX ORDER — DIPHENHYDRAMINE HCL 25 MG
50 CAPSULE ORAL
Status: CANCELLED
Start: 2024-08-22

## 2024-08-22 RX ORDER — DIPHENHYDRAMINE HYDROCHLORIDE 50 MG/ML
50 INJECTION INTRAMUSCULAR; INTRAVENOUS
Status: CANCELLED
Start: 2024-08-22

## 2024-08-22 RX ORDER — ACETAMINOPHEN 325 MG/1
650 TABLET ORAL
Status: CANCELLED | OUTPATIENT
Start: 2024-08-29

## 2024-08-22 RX ORDER — DIPHENHYDRAMINE HCL 25 MG
50 CAPSULE ORAL
Status: CANCELLED
Start: 2024-09-05

## 2024-08-22 RX ORDER — ONDANSETRON 2 MG/ML
8 INJECTION INTRAMUSCULAR; INTRAVENOUS ONCE
Status: CANCELLED | OUTPATIENT
Start: 2024-08-29

## 2024-08-22 RX ORDER — HEPARIN SODIUM,PORCINE 10 UNIT/ML
5-20 VIAL (ML) INTRAVENOUS DAILY PRN
Status: CANCELLED | OUTPATIENT
Start: 2024-09-05

## 2024-08-22 RX ORDER — ALBUTEROL SULFATE 0.83 MG/ML
2.5 SOLUTION RESPIRATORY (INHALATION)
Status: CANCELLED | OUTPATIENT
Start: 2024-08-22

## 2024-08-22 RX ORDER — HEPARIN SODIUM,PORCINE 10 UNIT/ML
5-20 VIAL (ML) INTRAVENOUS DAILY PRN
Status: CANCELLED | OUTPATIENT
Start: 2024-08-22

## 2024-08-22 RX ORDER — METHYLPREDNISOLONE SODIUM SUCCINATE 125 MG/2ML
125 INJECTION, POWDER, LYOPHILIZED, FOR SOLUTION INTRAMUSCULAR; INTRAVENOUS
Status: CANCELLED
Start: 2024-08-29

## 2024-08-22 RX ORDER — METHYLPREDNISOLONE SODIUM SUCCINATE 125 MG/2ML
125 INJECTION, POWDER, LYOPHILIZED, FOR SOLUTION INTRAMUSCULAR; INTRAVENOUS
Status: CANCELLED
Start: 2024-08-22

## 2024-08-22 RX ORDER — ALBUTEROL SULFATE 0.83 MG/ML
2.5 SOLUTION RESPIRATORY (INHALATION)
Status: CANCELLED | OUTPATIENT
Start: 2024-08-29

## 2024-08-22 RX ORDER — METHYLPREDNISOLONE SODIUM SUCCINATE 125 MG/2ML
125 INJECTION, POWDER, LYOPHILIZED, FOR SOLUTION INTRAMUSCULAR; INTRAVENOUS
Status: CANCELLED
Start: 2024-09-05

## 2024-08-22 RX ORDER — ALBUTEROL SULFATE 90 UG/1
1-2 AEROSOL, METERED RESPIRATORY (INHALATION)
Status: CANCELLED
Start: 2024-08-29

## 2024-08-22 RX ORDER — DIPHENHYDRAMINE HYDROCHLORIDE 50 MG/ML
50 INJECTION INTRAMUSCULAR; INTRAVENOUS
Status: CANCELLED
Start: 2024-08-29

## 2024-08-22 RX ORDER — DIPHENHYDRAMINE HCL 25 MG
50 CAPSULE ORAL
Status: CANCELLED
Start: 2024-08-29

## 2024-08-22 RX ORDER — ALBUTEROL SULFATE 90 UG/1
1-2 AEROSOL, METERED RESPIRATORY (INHALATION)
Status: CANCELLED
Start: 2024-08-22

## 2024-08-22 RX ORDER — EPINEPHRINE 1 MG/ML
0.3 INJECTION, SOLUTION INTRAMUSCULAR; SUBCUTANEOUS EVERY 5 MIN PRN
Status: CANCELLED | OUTPATIENT
Start: 2024-08-29

## 2024-08-22 RX ORDER — MEPERIDINE HYDROCHLORIDE 25 MG/ML
25 INJECTION INTRAMUSCULAR; INTRAVENOUS; SUBCUTANEOUS EVERY 30 MIN PRN
Status: CANCELLED | OUTPATIENT
Start: 2024-08-22

## 2024-08-22 RX ORDER — HEPARIN SODIUM (PORCINE) LOCK FLUSH IV SOLN 100 UNIT/ML 100 UNIT/ML
5 SOLUTION INTRAVENOUS
Status: CANCELLED | OUTPATIENT
Start: 2024-09-05

## 2024-08-22 RX ORDER — HEPARIN SODIUM (PORCINE) LOCK FLUSH IV SOLN 100 UNIT/ML 100 UNIT/ML
5 SOLUTION INTRAVENOUS
Status: DISCONTINUED | OUTPATIENT
Start: 2024-08-22 | End: 2024-08-22 | Stop reason: HOSPADM

## 2024-08-22 RX ORDER — HEPARIN SODIUM (PORCINE) LOCK FLUSH IV SOLN 100 UNIT/ML 100 UNIT/ML
5 SOLUTION INTRAVENOUS
Status: CANCELLED | OUTPATIENT
Start: 2024-08-29

## 2024-08-22 RX ORDER — EPINEPHRINE 1 MG/ML
0.3 INJECTION, SOLUTION INTRAMUSCULAR; SUBCUTANEOUS EVERY 5 MIN PRN
Status: CANCELLED | OUTPATIENT
Start: 2024-08-22

## 2024-08-22 RX ORDER — MEPERIDINE HYDROCHLORIDE 25 MG/ML
25 INJECTION INTRAMUSCULAR; INTRAVENOUS; SUBCUTANEOUS EVERY 30 MIN PRN
Status: CANCELLED | OUTPATIENT
Start: 2024-08-29

## 2024-08-22 RX ORDER — ONDANSETRON 2 MG/ML
8 INJECTION INTRAMUSCULAR; INTRAVENOUS ONCE
Status: CANCELLED | OUTPATIENT
Start: 2024-08-22

## 2024-08-22 RX ORDER — ACETAMINOPHEN 325 MG/1
650 TABLET ORAL
Status: CANCELLED | OUTPATIENT
Start: 2024-08-22

## 2024-08-22 RX ORDER — LORAZEPAM 2 MG/ML
0.5 INJECTION INTRAMUSCULAR EVERY 4 HOURS PRN
Status: CANCELLED | OUTPATIENT
Start: 2024-09-05

## 2024-08-22 RX ORDER — ALBUTEROL SULFATE 0.83 MG/ML
2.5 SOLUTION RESPIRATORY (INHALATION)
Status: CANCELLED | OUTPATIENT
Start: 2024-09-05

## 2024-08-22 RX ORDER — ALBUTEROL SULFATE 90 UG/1
1-2 AEROSOL, METERED RESPIRATORY (INHALATION)
Status: CANCELLED
Start: 2024-09-05

## 2024-08-22 RX ORDER — DIPHENHYDRAMINE HYDROCHLORIDE 50 MG/ML
50 INJECTION INTRAMUSCULAR; INTRAVENOUS
Status: CANCELLED
Start: 2024-09-05

## 2024-08-22 RX ORDER — HEPARIN SODIUM,PORCINE 10 UNIT/ML
5-20 VIAL (ML) INTRAVENOUS DAILY PRN
Status: CANCELLED | OUTPATIENT
Start: 2024-08-29

## 2024-08-22 RX ORDER — LORAZEPAM 2 MG/ML
0.5 INJECTION INTRAMUSCULAR EVERY 4 HOURS PRN
Status: CANCELLED | OUTPATIENT
Start: 2024-08-22

## 2024-08-22 RX ORDER — MEPERIDINE HYDROCHLORIDE 25 MG/ML
25 INJECTION INTRAMUSCULAR; INTRAVENOUS; SUBCUTANEOUS EVERY 30 MIN PRN
Status: CANCELLED | OUTPATIENT
Start: 2024-09-05

## 2024-08-22 RX ORDER — ACETAMINOPHEN 325 MG/1
650 TABLET ORAL
Status: CANCELLED | OUTPATIENT
Start: 2024-09-05

## 2024-08-22 RX ORDER — ONDANSETRON 2 MG/ML
8 INJECTION INTRAMUSCULAR; INTRAVENOUS ONCE
Status: COMPLETED | OUTPATIENT
Start: 2024-08-22 | End: 2024-08-22

## 2024-08-22 RX ORDER — EPINEPHRINE 1 MG/ML
0.3 INJECTION, SOLUTION INTRAMUSCULAR; SUBCUTANEOUS EVERY 5 MIN PRN
Status: CANCELLED | OUTPATIENT
Start: 2024-09-05

## 2024-08-22 RX ADMIN — PACLITAXEL 138 MG: 6 INJECTION, SOLUTION INTRAVENOUS at 12:28

## 2024-08-22 RX ADMIN — SODIUM CHLORIDE 250 ML: 9 INJECTION, SOLUTION INTRAVENOUS at 11:43

## 2024-08-22 RX ADMIN — ONDANSETRON 8 MG: 2 INJECTION INTRAMUSCULAR; INTRAVENOUS at 11:42

## 2024-08-22 RX ADMIN — Medication 5 ML: at 13:33

## 2024-08-22 RX ADMIN — SODIUM CHLORIDE 130 MG: 9 INJECTION, SOLUTION INTRAVENOUS at 11:47

## 2024-08-22 ASSESSMENT — PAIN SCALES - GENERAL: PAINLEVEL: NO PAIN (0)

## 2024-08-22 NOTE — PROGRESS NOTES
Nursing Note:  Jenni Torres presents today for port access and labs.    Patient seen by provider today: Yes: RED Adamson   present during visit today: Not Applicable.    Note: N/A.    Intravenous Access:  Lab draw site port, Needle type [place], Gauge 20.  Labs drawn without difficulty.  Implanted Port.    Discharge Plan:   Patient was sent to [facility] for PA appointment.    Misa Sanchez RN

## 2024-08-22 NOTE — LETTER
8/22/2024      Jenni Torres  [address]  [place] [address]      Dear Colleague,    Thank you for referring your patient, Jenni Torres, to the [facility]. Please see a copy of my visit note below.    Oncology/Hematology Visit Note    Aug 22, 2024    Reason for visit: Follow up breast cancer    Oncology HPI: Jenni Torres is a 53 year old female with right-sided breast cancer, ER/MS positive, HER2 positive s/p right-sided lumpectomy with sentinel lymph node biopsy.  Pathology with invasive ductal carcinoma, grade 2, measured 1.3 cm in greatest dimension and associated DCIS.  All margins were negative on the invasive tumor, but she did have positive margins on the DCIS portion.  All 3 sentinel lymph nodes were negative.    Dr. Mariee recommended Taxol/Herceptin x 12 weeks, then followed by single agent Herceptin for total x 1 year.  Echo on 7/18/2024 with EF of 60-65%.  Taxol/Herceptin C1 D1 completed on 7/25/2024.    She is here today for Taxol/Herceptin C5D1.    Interval History: Jenni is here today unaccompanied.  She has been tolerating TH pretty well.  She was started on melatonin 5 mg at night and this has helped her insomnia.  No neuropathy, fever or chills, vomiting or diarrhea, bleeding.  No other new complaints.    Review of Systems: See interval hx. Denies fevers, chills, HA, dizziness, CP, SOB, abdominal pain, diarrhea, changes in urination, bruising, rash.     PMHx and Social Hx reviewed per EPIC.      Medications:  Current Outpatient Medications   Medication Sig Dispense Refill     levETIRAcetam (KEPPRA) 500 MG tablet Take 1 tablet (500 mg) by mouth daily 90 tablet 3     levothyroxine (SYNTHROID/LEVOTHROID) 137 MCG tablet Take 1 tablet (137 mcg) by mouth daily 90 tablet 1     lidocaine-prilocaine (EMLA) 2.5-2.5 % external cream Apply topically as needed for other (apply to port site 30 minutes prior to arrival at infusion center) 30 g 1     Multiple  "Vitamin (ONE-A-DAY ESSENTIAL) TABS Take 1 tablet by mouth daily       prochlorperazine (COMPAZINE) 10 MG tablet Take 1 tablet (10 mg) by mouth every 6 hours as needed for nausea or vomiting 30 tablet 2     sertraline (ZOLOFT) 50 MG tablet Take 1 tablet by mouth daily. 90 tablet 3     valACYclovir (VALTREX) 1000 mg tablet Take 2 tablets (2,000 mg) by mouth 2 times daily 4 tablet 3     dexAMETHasone (DECADRON) 4 MG tablet Take 2 tablets (8 mg) by mouth daily Take 2 tablets (8mg) the evening before chemotherapy infusion and 2 tablets (8mg) the morning of chemotherapy infusion, for first two chemotherapy infusions. Take with food. (Patient not taking: Reported on 8/22/2024) 8 tablet 0       No Known Allergies      EXAM:    /64   Pulse 64   Temp 97.9  F (36.6  C) (Temporal)   Resp 18   Ht 1.699 m (5' 6.89\")   Wt 65.3 kg (144 lb)   SpO2 99%   BMI 22.63 kg/m      GENERAL:  Female, in no acute distress.  Alert and oriented x3. Well groomed.   HEENT:  Normocephalic, atraumatic. No conjunctival injection or eye swelling.   LUNGS:  Nonlabored breathing, CTA  BREAST: Not checked today  MSK: Full ROM UE.    SKIN: No rash on exposed skin.   NEURO: CN grossly intact, speech normal  PSYCH: Mentation appears normal, insight and judgement intact      Labs:    08/22/24 10:39   WBC 4.6   Hemoglobin 11.5 (L)   Hematocrit 35.5   Platelet Count 268   RBC Count 3.77 (L)   MCV 94   MCH 30.5   MCHC 32.4   RDW 13.1   % Neutrophils 60   % Lymphocytes 30   % Monocytes 6   % Eosinophils 1   % Basophils 1   Absolute Basophils 0.1   Absolute Eosinophils 0.1   Absolute Immature Granulocytes 0.0   Absolute Lymphocytes 1.4   Absolute Monocytes 0.3   % Immature Granulocytes 1   Absolute Neutrophils 2.8   Absolute NRBCs 0.0   NRBCs per 100 WBC 0     Imaging: n/a    Impression/Plan: Jenni Torres is a 53 year old female with right-sided ER/NH positive, HER2 positive breast cancer s/p lumpectomy currently on adjuvant " Taxol/Herceptin.    Breast cancer: Invasive ductal carcinoma, grade 2, 1.3 cm with associated DCIS.  All margins were negative on the invasive tumor, but she did have positive margin on the DCIS.  All 3 sentinel lymph nodes negative.  She is also s/p right-sided lumpectomy and this is healing well.  She started adjuvant Taxol Herceptin with C1 D1 completed on 7/25/2024.  She is tolerating this really well.  Slight anemia at 11.5, but WBC/ANC look great and platelets are normal.  Continue treatment weekly, provider visits every 2 - 3 weeks.  With Dr. Mariee's upcoming departure, she will likely establish care with Dr. Tomas in late Sept.   -9/12 Love, TH cycle 8    Nausea: Secondary to chemotherapy, likely Taxol.  She has Compazine and this is working well for her.  She only has to take it occasionally.    Insomnia: New in the last few weeks, started melatonin 5 mg at bedtime with improvement.  She will continue this.  If melatonin becomes less effective, we will discuss trazodone.      Chart documentation with Dragon Voice recognition Software. Although reviewed after completion, some words and grammatical errors may remain.    20 minutes spent on the date of the encounter doing chart review, review of test results, interpretation of tests, patient visit, and documentation     Love Coates PA-C  Hematology/Oncology  [facility]                  Again, thank you for allowing me to participate in the care of your patient.        Sincerely,        Love Coates PA-C

## 2024-08-22 NOTE — NURSING NOTE
"Oncology Rooming Note    August 22, 2024 10:50 AM   Jenni Torres is a 53 year old female who presents for:    Chief Complaint   Patient presents with    Oncology Clinic Visit     Malignant neoplasm of upper-outer quadrant of right breast in female, estrogen receptor positive      Initial Vitals: /64   Pulse 64   Temp 97.9  F (36.6  C) (Temporal)   Resp 18   Ht 1.699 m (5' 6.89\")   Wt 65.3 kg (144 lb)   SpO2 99%   BMI 22.63 kg/m   Estimated body mass index is 22.63 kg/m  as calculated from the following:    Height as of this encounter: 1.699 m (5' 6.89\").    Weight as of this encounter: 65.3 kg (144 lb). Body surface area is 1.76 meters squared.  No Pain (0) Comment: Data Unavailable   No LMP recorded.  Allergies reviewed: Yes  Medications reviewed: Yes    Medications: Medication refills not needed today.  Pharmacy name entered into EPIC: [facility] - [place], [place] - [address]    Frailty Screening:   Is the patient here for a new oncology consult visit in cancer care? 2. No      Clinical concerns: Follow up       Delaney Khan MA              "

## 2024-08-22 NOTE — PROGRESS NOTES
Infusion Nursing Note:  Jenni BOWENS Melissa presents today for C5D1 Trazimera + Taxol.    Patient seen by provider today: Yes: RED Rao   present during visit today: Not Applicable.    Note: N/A.      Intravenous Access:  Implanted Port accessed by FT RN.    Treatment Conditions:  Lab Results   Component Value Date    HGB 11.5 (L) 08/22/2024    WBC 4.6 08/22/2024    ANEU 5.3 11/22/2018    ANEUTAUTO 2.8 08/22/2024     08/22/2024        Results reviewed, labs MET treatment parameters, ok to proceed with treatment.      Post Infusion Assessment:  Patient tolerated infusion without incident.  Blood return noted pre and post infusion.  Site patent and intact, free from redness, edema or discomfort.  No evidence of extravasations.  Access discontinued per protocol.       Discharge Plan:   AVS to patient via MYCHART.  Patient will return 8/29/24 for next appointment.   Patient discharged in stable condition accompanied by: self.  Departure Mode: Ambulatory.      Mandi Weeks RN

## 2024-08-23 NOTE — TELEPHONE ENCOUNTER
S-(situation): pt c/o 3 episodes of diarrhea last night    B-(background): pt being seen for right breast cancer, most recent infusion on 8/15 (C4D1 Trastuzumab, Taxol)    A-(assessment): had three episodes of diarrhea last evening, has not had anymore since. Denies blood in stool, abdominal cramping, fever, nausea, vomiting, shortness of breath, or chest pain. Did not take any medication for diarrhea. Does not feel dehydrated. Overall feels pretty good today.    R-(recommendations): Continue to monitor, take Imodium as directed for diarrhea, increase oral fluids, watch for increased episodes of diarrhea, blood in stool, abdominal cramping, or fever and seek care in ED if these occur. Advised about on call triage over the weekend.

## 2024-08-29 ENCOUNTER — LAB (OUTPATIENT)
Dept: INFUSION THERAPY | Facility: CLINIC | Age: 54
End: 2024-08-29
Attending: PHYSICIAN ASSISTANT
Payer: COMMERCIAL

## 2024-08-29 ENCOUNTER — TELEPHONE (OUTPATIENT)
Dept: ONCOLOGY | Facility: CLINIC | Age: 54
End: 2024-08-29

## 2024-08-29 VITALS
WEIGHT: 147 LBS | HEART RATE: 61 BPM | TEMPERATURE: 97.9 F | DIASTOLIC BLOOD PRESSURE: 65 MMHG | RESPIRATION RATE: 16 BRPM | OXYGEN SATURATION: 99 % | BODY MASS INDEX: 23.1 KG/M2 | SYSTOLIC BLOOD PRESSURE: 97 MMHG

## 2024-08-29 DIAGNOSIS — Z17.0 MALIGNANT NEOPLASM OF UPPER-OUTER QUADRANT OF RIGHT BREAST IN FEMALE, ESTROGEN RECEPTOR POSITIVE (H): Primary | ICD-10-CM

## 2024-08-29 DIAGNOSIS — C50.411 MALIGNANT NEOPLASM OF UPPER-OUTER QUADRANT OF RIGHT BREAST IN FEMALE, ESTROGEN RECEPTOR POSITIVE (H): Primary | ICD-10-CM

## 2024-08-29 LAB
BASOPHILS # BLD AUTO: 0.1 10E3/UL (ref 0–0.2)
BASOPHILS NFR BLD AUTO: 1 %
EOSINOPHIL # BLD AUTO: 0.1 10E3/UL (ref 0–0.7)
EOSINOPHIL NFR BLD AUTO: 2 %
ERYTHROCYTE [DISTWIDTH] IN BLOOD BY AUTOMATED COUNT: 12.9 % (ref 10–15)
HCT VFR BLD AUTO: 36.4 % (ref 35–47)
HGB BLD-MCNC: 11.7 G/DL (ref 11.7–15.7)
IMM GRANULOCYTES # BLD: 0 10E3/UL
IMM GRANULOCYTES NFR BLD: 1 %
LYMPHOCYTES # BLD AUTO: 1.5 10E3/UL (ref 0.8–5.3)
LYMPHOCYTES NFR BLD AUTO: 31 %
MCH RBC QN AUTO: 30.7 PG (ref 26.5–33)
MCHC RBC AUTO-ENTMCNC: 32.1 G/DL (ref 31.5–36.5)
MCV RBC AUTO: 96 FL (ref 78–100)
MONOCYTES # BLD AUTO: 0.3 10E3/UL (ref 0–1.3)
MONOCYTES NFR BLD AUTO: 6 %
NEUTROPHILS # BLD AUTO: 2.8 10E3/UL (ref 1.6–8.3)
NEUTROPHILS NFR BLD AUTO: 59 %
NRBC # BLD AUTO: 0 10E3/UL
NRBC BLD AUTO-RTO: 0 /100
PLATELET # BLD AUTO: 241 10E3/UL (ref 150–450)
RBC # BLD AUTO: 3.81 10E6/UL (ref 3.8–5.2)
WBC # BLD AUTO: 4.7 10E3/UL (ref 4–11)

## 2024-08-29 PROCEDURE — 85025 COMPLETE CBC W/AUTO DIFF WBC: CPT | Performed by: PHYSICIAN ASSISTANT

## 2024-08-29 PROCEDURE — 96413 CHEMO IV INFUSION 1 HR: CPT

## 2024-08-29 PROCEDURE — 250N000011 HC RX IP 250 OP 636: Performed by: PHYSICIAN ASSISTANT

## 2024-08-29 PROCEDURE — 258N000003 HC RX IP 258 OP 636: Performed by: PHYSICIAN ASSISTANT

## 2024-08-29 PROCEDURE — 96375 TX/PRO/DX INJ NEW DRUG ADDON: CPT

## 2024-08-29 PROCEDURE — 96417 CHEMO IV INFUS EACH ADDL SEQ: CPT

## 2024-08-29 PROCEDURE — 36591 DRAW BLOOD OFF VENOUS DEVICE: CPT | Performed by: PHYSICIAN ASSISTANT

## 2024-08-29 RX ORDER — ONDANSETRON 2 MG/ML
8 INJECTION INTRAMUSCULAR; INTRAVENOUS ONCE
Status: COMPLETED | OUTPATIENT
Start: 2024-08-29 | End: 2024-08-29

## 2024-08-29 RX ORDER — HEPARIN SODIUM (PORCINE) LOCK FLUSH IV SOLN 100 UNIT/ML 100 UNIT/ML
5 SOLUTION INTRAVENOUS
Status: DISCONTINUED | OUTPATIENT
Start: 2024-08-29 | End: 2024-08-29 | Stop reason: HOSPADM

## 2024-08-29 RX ADMIN — SODIUM CHLORIDE 130 MG: 9 INJECTION, SOLUTION INTRAVENOUS at 09:27

## 2024-08-29 RX ADMIN — SODIUM CHLORIDE 250 ML: 9 INJECTION, SOLUTION INTRAVENOUS at 09:13

## 2024-08-29 RX ADMIN — Medication 5 ML: at 09:18

## 2024-08-29 RX ADMIN — ONDANSETRON 8 MG: 2 INJECTION INTRAMUSCULAR; INTRAVENOUS at 09:13

## 2024-08-29 RX ADMIN — PACLITAXEL 138 MG: 6 INJECTION, SOLUTION INTRAVENOUS at 10:05

## 2024-08-29 ASSESSMENT — PAIN SCALES - GENERAL: PAINLEVEL: NO PAIN (0)

## 2024-08-29 NOTE — TELEPHONE ENCOUNTER
"Pt completed Care Companion questionnaire and reported the following symptoms: moderate pain.   She had chemo infusion today: D1C6 Taxol and Herceptin.     Writer contacted pt to discuss symptoms further.  Pt states that she continues to have intermittent pain in her abdomen. Started shortly after she started chemo. Pain is not localized to one place in her abdomen. Sometimes it is located in her lower abdomen and feels like a cramping pain. Sometimes it is located in her upper abdomen, and then it feels like a sharper pain. Pains do not last long. They come and go, usually within about 5 minutes. Rates pain at 3/10 when it is lower abdominal cramping, and 4/10 when it is in her upper abdomen and feels sharper.   Pt denies chest pain, shortness of breath, fever/chills, dizziness, or other urgent symptoms.   States that she has had a few episodes of diarrhea in the past, but denies diarrhea at this time. Also denies nausea/vomiting.   States that she has felt \"more gassy\".     Advised pt to continue to monitor. If symptoms worsen or become more persistent or localized in one area, pt should contact the clinic.  Reviewed emergent symptoms that would warrant return call to clinic or evaluation in ER.   Pt could also try simethicone to see if this helps with symptoms.    Will route to RED Rao, for review.  Patient verbalized understanding and agreement with plan.  Patient was instructed to call the clinic with any questions, concerns, or worsening symptoms.  Cherri Keller RN on 8/29/2024 at 3:25 PM   "

## 2024-08-29 NOTE — PROGRESS NOTES
Nursing Note:  Jenni Torres presents today for port access and labs.    Patient seen by provider today: No   present during visit today: Not Applicable.    Note: N/A.    Intravenous Access:  Labs drawn without difficulty.  Implanted Port.    Discharge Plan:   Patient was sent to [facility] for infusion appointment.    Mandi Weeks RN

## 2024-08-29 NOTE — CONFIDENTIAL NOTE
Infusion Nursing Note:  Jenni Torres presents today for D1C6 Taxol and Herceptin.    Patient seen by provider today: no   present during visit today: Not Applicable.    Note: N/A.      Intravenous Access:  Implanted Port.    Treatment Conditions:  Lab Results   Component Value Date    HGB 11.7 08/29/2024    WBC 4.7 08/29/2024    ANEU 5.3 11/22/2018    ANEUTAUTO 2.8 08/29/2024     08/29/2024        Results reviewed, labs MET treatment parameters, ok to proceed with treatment.      Post Infusion Assessment:  Patient tolerated infusion without incident.  Blood return noted pre and post infusion.  Site patent and intact, free from redness, edema or discomfort.  No evidence of extravasations.  Access discontinued per protocol.       Discharge Plan:   [facility]  Patient will return 9/5 for next appointment.   Patient discharged in stable condition accompanied by: self.  Departure Mode: Ambulatory.      Boni Joyce RN

## 2024-08-30 NOTE — TELEPHONE ENCOUNTER
Writer called pt again.  She was notified with recommendations per RED Rao.  Patient verbalized understanding and agreement with plan.  Patient was instructed to call the clinic with any questions, concerns, or worsening symptoms.  Cherri Keller RN on 8/30/2024 at 3:57 PM

## 2024-08-30 NOTE — TELEPHONE ENCOUNTER
Love Hammonds PA-C  You; Maureen Casanova RN;  Oncology [id number] hours ago (5:41 PM)       Simethicone is ok.    Pharmacy, I think we should add PRN atropine for her next cycle. What is the usual dose? I looked on uptodate, but not specific to antispasmodic doses.    Love Poole PA-C You52 minutes ago (9:04 AM)       We will try atropine at her next infusion to see if that helps prevent the abd cramping.     Attempted to reach pt, but she did not answer.  Left  message for pt to return call to discuss recommendations.  Cherri Keller, JEANETTE on 8/30/2024 at 9:59 AM

## 2024-09-05 ENCOUNTER — INFUSION THERAPY VISIT (OUTPATIENT)
Dept: INFUSION THERAPY | Facility: CLINIC | Age: 54
End: 2024-09-05
Attending: PHYSICIAN ASSISTANT
Payer: COMMERCIAL

## 2024-09-05 VITALS
WEIGHT: 144.6 LBS | HEART RATE: 65 BPM | TEMPERATURE: 98.2 F | BODY MASS INDEX: 22.72 KG/M2 | OXYGEN SATURATION: 98 % | SYSTOLIC BLOOD PRESSURE: 110 MMHG | DIASTOLIC BLOOD PRESSURE: 60 MMHG

## 2024-09-05 DIAGNOSIS — Z17.0 MALIGNANT NEOPLASM OF UPPER-OUTER QUADRANT OF RIGHT BREAST IN FEMALE, ESTROGEN RECEPTOR POSITIVE (H): Primary | ICD-10-CM

## 2024-09-05 DIAGNOSIS — C50.411 MALIGNANT NEOPLASM OF UPPER-OUTER QUADRANT OF RIGHT BREAST IN FEMALE, ESTROGEN RECEPTOR POSITIVE (H): Primary | ICD-10-CM

## 2024-09-05 LAB
BASOPHILS # BLD AUTO: 0 10E3/UL (ref 0–0.2)
BASOPHILS NFR BLD AUTO: 1 %
EOSINOPHIL # BLD AUTO: 0.1 10E3/UL (ref 0–0.7)
EOSINOPHIL NFR BLD AUTO: 2 %
ERYTHROCYTE [DISTWIDTH] IN BLOOD BY AUTOMATED COUNT: 13 % (ref 10–15)
HCT VFR BLD AUTO: 36 % (ref 35–47)
HGB BLD-MCNC: 11.7 G/DL (ref 11.7–15.7)
IMM GRANULOCYTES # BLD: 0 10E3/UL
IMM GRANULOCYTES NFR BLD: 1 %
LYMPHOCYTES # BLD AUTO: 1.3 10E3/UL (ref 0.8–5.3)
LYMPHOCYTES NFR BLD AUTO: 29 %
MCH RBC QN AUTO: 30.5 PG (ref 26.5–33)
MCHC RBC AUTO-ENTMCNC: 32.5 G/DL (ref 31.5–36.5)
MCV RBC AUTO: 94 FL (ref 78–100)
MONOCYTES # BLD AUTO: 0.3 10E3/UL (ref 0–1.3)
MONOCYTES NFR BLD AUTO: 7 %
NEUTROPHILS # BLD AUTO: 2.7 10E3/UL (ref 1.6–8.3)
NEUTROPHILS NFR BLD AUTO: 60 %
NRBC # BLD AUTO: 0 10E3/UL
NRBC BLD AUTO-RTO: 0 /100
PLATELET # BLD AUTO: 248 10E3/UL (ref 150–450)
RBC # BLD AUTO: 3.84 10E6/UL (ref 3.8–5.2)
WBC # BLD AUTO: 4.5 10E3/UL (ref 4–11)

## 2024-09-05 PROCEDURE — 250N000011 HC RX IP 250 OP 636: Performed by: PHYSICIAN ASSISTANT

## 2024-09-05 PROCEDURE — 258N000003 HC RX IP 258 OP 636: Performed by: PHYSICIAN ASSISTANT

## 2024-09-05 PROCEDURE — 36415 COLL VENOUS BLD VENIPUNCTURE: CPT | Performed by: PHYSICIAN ASSISTANT

## 2024-09-05 PROCEDURE — 96413 CHEMO IV INFUSION 1 HR: CPT

## 2024-09-05 PROCEDURE — 85025 COMPLETE CBC W/AUTO DIFF WBC: CPT | Performed by: PHYSICIAN ASSISTANT

## 2024-09-05 PROCEDURE — 96415 CHEMO IV INFUSION ADDL HR: CPT

## 2024-09-05 PROCEDURE — 96417 CHEMO IV INFUS EACH ADDL SEQ: CPT

## 2024-09-05 PROCEDURE — 96375 TX/PRO/DX INJ NEW DRUG ADDON: CPT

## 2024-09-05 RX ORDER — ONDANSETRON 2 MG/ML
8 INJECTION INTRAMUSCULAR; INTRAVENOUS ONCE
Status: COMPLETED | OUTPATIENT
Start: 2024-09-05 | End: 2024-09-05

## 2024-09-05 RX ORDER — HEPARIN SODIUM (PORCINE) LOCK FLUSH IV SOLN 100 UNIT/ML 100 UNIT/ML
5 SOLUTION INTRAVENOUS
Status: DISCONTINUED | OUTPATIENT
Start: 2024-09-05 | End: 2024-09-05 | Stop reason: HOSPADM

## 2024-09-05 RX ADMIN — SODIUM CHLORIDE 130 MG: 9 INJECTION, SOLUTION INTRAVENOUS at 09:34

## 2024-09-05 RX ADMIN — SODIUM CHLORIDE 250 ML: 9 INJECTION, SOLUTION INTRAVENOUS at 09:29

## 2024-09-05 RX ADMIN — PACLITAXEL 138 MG: 6 INJECTION, SOLUTION INTRAVENOUS at 10:11

## 2024-09-05 RX ADMIN — ONDANSETRON 8 MG: 2 INJECTION INTRAMUSCULAR; INTRAVENOUS at 09:29

## 2024-09-05 RX ADMIN — Medication 5 ML: at 12:18

## 2024-09-05 NOTE — PROGRESS NOTES
Infusion Nursing Note:  Jenni Torres presents today for C7D1 Trazimera/Taxol.    Patient seen by provider today: No   present during visit today: Not Applicable.    Note: Atropine added to treatment plan starting this cycle. Pt states that she has had intermittent abd cramping (feels like menstrual cramps) after receiving Taxol. Does not occur during infusion. However, she states it isn't troubling enough for her to take anything for it, even OTC medications. She declines atropine as premedication today    Addendum: Approx 10 minutes left of Taxol infusion, pt c/o abd cramping and mild nausea. States she is currently due for her menstural cycle. Reports taking Tylenol  and felt relief shortly thereafter    Ices both hands/feet during Taxol infusion      Intravenous Access:  Labs drawn without difficulty.  Implanted Port.    Treatment Conditions:  Lab Results   Component Value Date    HGB 11.7 09/05/2024    WBC 4.5 09/05/2024    ANEU 5.3 11/22/2018    ANEUTAUTO 2.7 09/05/2024     09/05/2024        Results reviewed, labs MET treatment parameters, ok to proceed with treatment.  ECHO/MUGA completed 7/18/24  EF 60-65%.      Post Infusion Assessment:  Patient tolerated infusion without incident.  Blood return noted pre and post infusion.  Site patent and intact, free from redness, edema or discomfort.  No evidence of extravasations.  Access discontinued per protocol.       Discharge Plan:   AVS to patient via MYCHART.  Patient will return 9/12/24 for labs/RC/treatment   Patient discharged in stable condition accompanied by: self.  Departure Mode: Ambulatory.      Merna Amos RN

## 2024-09-11 NOTE — PROGRESS NOTES
Oncology/Hematology Visit Note    Sep 12, 2024    Reason for visit: Follow up breast cancer    Oncology HPI: Jenni Torres is a 53 year old female with right-sided breast cancer, ER/WA positive, HER2 positive s/p right-sided lumpectomy with sentinel lymph node biopsy.  Pathology with invasive ductal carcinoma, grade 2, measured 1.3 cm in greatest dimension and associated DCIS.  All margins were negative on the invasive tumor, but she did have positive margins on the DCIS portion.  All 3 sentinel lymph nodes were negative.    Dr. Mariee recommended Taxol/Herceptin x 12 weeks, then followed by single agent Herceptin for total x 1 year.  Echo on 7/18/2024 with EF of 60-65%.  Taxol/Herceptin C1 D1 completed on 7/25/2024.    She is here today for weekly Taxol/Herceptin C8D1.    Interval History: Jenni is here today unaccompanied.  Jenni is here unaccompanied today.  She continues to tolerate treatment pretty well, but has been having increased abdominal cramping, intermittently throughout her treatment and for the first time last week, the abdominal cramping occurred during infusion.  Intermittent diarrhea, but improved with Imodium.  No vomiting, nausea, headache, vision changes, dizziness or bleeding.  She saw dentist recently and needs a crown, but she will wait till chemo is done.  No other complaints.    Review of Systems: See interval hx. Denies fevers, chills, HA, dizziness, CP, SOB, abdominal pain, NV, changes in urination, bruising, rash.     PMHx and Social Hx reviewed per EPIC.      Medications:  Current Outpatient Medications   Medication Sig Dispense Refill    levETIRAcetam (KEPPRA) 500 MG tablet Take 1 tablet (500 mg) by mouth daily 90 tablet 3    levothyroxine (SYNTHROID/LEVOTHROID) 137 MCG tablet Take 1 tablet (137 mcg) by mouth daily 90 tablet 1    lidocaine-prilocaine (EMLA) 2.5-2.5 % external cream Apply topically as needed for other (apply to port site 30 minutes prior to arrival at infusion  [place]) 30 g 1    Multiple Vitamin (ONE-A-DAY ESSENTIAL) TABS Take 1 tablet by mouth daily      prochlorperazine (COMPAZINE) 10 MG tablet Take 1 tablet (10 mg) by mouth every 6 hours as needed for nausea or vomiting 30 tablet 2    sertraline (ZOLOFT) 50 MG tablet Take 1 tablet by mouth daily. 90 tablet 3    valACYclovir (VALTREX) 1000 mg tablet Take 2 tablets (2,000 mg) by mouth 2 times daily 4 tablet 3    dexAMETHasone (DECADRON) 4 MG tablet Take 2 tablets (8 mg) by mouth daily Take 2 tablets (8mg) the evening before chemotherapy infusion and 2 tablets (8mg) the morning of chemotherapy infusion, for first two chemotherapy infusions. Take with food. (Patient not taking: Reported on 8/22/2024) 8 tablet 0       No Known Allergies      EXAM:    /71   Pulse 62   Temp 98  F (36.7  C) (Tympanic)   Resp 16   Wt 65.5 kg (144 lb 6.4 oz)   SpO2 98%   BMI 22.69 kg/m      GENERAL:  Female, in no acute distress.  Alert and oriented x3. Well groomed.   HEENT:  Normocephalic, atraumatic. No conjunctival injection or eye swelling.   LUNGS:  Nonlabored breathing, no cough or audible wheezing, able to speak full sentences.  MSK: Full ROM UE.    SKIN: No rash on exposed skin.   NEURO: CN grossly intact, speech normal  PSYCH: Mentation appears normal, insight and judgement intact      Labs:    09/12/24 07:40   Albumin 4.4   Protein Total 7.0   Alkaline Phosphatase 59   ALT 12   AST 15   Bilirubin Direct <0.20   Bilirubin Total 0.2   WBC 4.4   Hemoglobin 11.6 (L)   Hematocrit 35.8   Platelet Count 263   RBC Count 3.79 (L)   MCV 95   MCH 30.6   MCHC 32.4   RDW 13.2   % Neutrophils 59   % Lymphocytes 29   % Monocytes 7   % Eosinophils 2   % Basophils 1   Absolute Basophils 0.1   Absolute Eosinophils 0.1   Absolute Immature Granulocytes 0.0   Absolute Lymphocytes 1.3   Absolute Monocytes 0.3   % Immature Granulocytes 1   Absolute Neutrophils 2.6   Absolute NRBCs 0.0   NRBCs per 100 WBC 0     Imaging: n/a    Impression/Plan:  Jenni Torres is a 53 year old female with right-sided ER/CA positive, HER2 positive breast cancer s/p lumpectomy currently on adjuvant Taxol/Herceptin.    Breast cancer: Invasive ductal carcinoma, grade 2, 1.3 cm with associated DCIS.  All margins were negative on the invasive tumor, but she did have positive margin on the DCIS.  All 3 sentinel lymph nodes negative.  She is also s/p right-sided lumpectomy and this is healing well.  She started adjuvant Taxol Herceptin with C1 D1 completed on 7/25/2024.  She is tolerating this really well.  Slight anemia at 11.6, but WBC/ANC look great and platelets are normal.  Continue treatment weekly, provider visits every 2-3 weeks.  Okay to proceed with Taxol/Herceptin week 8 today.  Plan total 12 weeks, then switching over to Herceptin to complete a year. With Dr. Mariee's upcoming departure, she will establish care with Dr. Tomas.  Echo 7/18/2024 normal, she will be due mid October 2024.  -Rad Onc referral  -9/24 Dr Tomas, TH cycle 10    Abdominal cramping: Initially occurring after treatment, but last week, it occurred during infusion.  Atropine was added to the plan and recommended she try that during infusion today.    Nausea: Secondary to chemotherapy, likely Taxol.  She has Compazine and this is working well for her.  She only has to take it occasionally.    Insomnia: Improved with melatonin.      Chart documentation with Dragon Voice recognition Software. Although reviewed after completion, some words and grammatical errors may remain.    20 minutes spent on the date of the encounter doing chart review, review of test results, interpretation of tests, patient visit, and documentation     Love Coates PA-C  Hematology/Oncology  [facility]

## 2024-09-12 ENCOUNTER — INFUSION THERAPY VISIT (OUTPATIENT)
Dept: INFUSION THERAPY | Facility: CLINIC | Age: 54
End: 2024-09-12
Attending: PHYSICIAN ASSISTANT
Payer: COMMERCIAL

## 2024-09-12 ENCOUNTER — LAB (OUTPATIENT)
Dept: INFUSION THERAPY | Facility: CLINIC | Age: 54
End: 2024-09-12
Attending: INTERNAL MEDICINE
Payer: COMMERCIAL

## 2024-09-12 ENCOUNTER — ONCOLOGY VISIT (OUTPATIENT)
Dept: ONCOLOGY | Facility: CLINIC | Age: 54
End: 2024-09-12
Attending: INTERNAL MEDICINE
Payer: COMMERCIAL

## 2024-09-12 VITALS
BODY MASS INDEX: 22.69 KG/M2 | WEIGHT: 144.4 LBS | RESPIRATION RATE: 16 BRPM | SYSTOLIC BLOOD PRESSURE: 104 MMHG | HEART RATE: 62 BPM | TEMPERATURE: 98 F | DIASTOLIC BLOOD PRESSURE: 71 MMHG | OXYGEN SATURATION: 98 %

## 2024-09-12 DIAGNOSIS — Z17.0 MALIGNANT NEOPLASM OF UPPER-OUTER QUADRANT OF RIGHT BREAST IN FEMALE, ESTROGEN RECEPTOR POSITIVE (H): Primary | ICD-10-CM

## 2024-09-12 DIAGNOSIS — C50.411 MALIGNANT NEOPLASM OF UPPER-OUTER QUADRANT OF RIGHT BREAST IN FEMALE, ESTROGEN RECEPTOR POSITIVE (H): Primary | ICD-10-CM

## 2024-09-12 DIAGNOSIS — R11.0 CHEMOTHERAPY-INDUCED NAUSEA: ICD-10-CM

## 2024-09-12 DIAGNOSIS — T45.1X5A CHEMOTHERAPY-INDUCED NAUSEA: ICD-10-CM

## 2024-09-12 DIAGNOSIS — G47.00 INSOMNIA, UNSPECIFIED TYPE: ICD-10-CM

## 2024-09-12 LAB
ALBUMIN SERPL BCG-MCNC: 4.4 G/DL (ref 3.5–5.2)
ALP SERPL-CCNC: 59 U/L (ref 40–150)
ALT SERPL W P-5'-P-CCNC: 12 U/L (ref 0–50)
AST SERPL W P-5'-P-CCNC: 15 U/L (ref 0–45)
BASOPHILS # BLD AUTO: 0.1 10E3/UL (ref 0–0.2)
BASOPHILS NFR BLD AUTO: 1 %
BILIRUB DIRECT SERPL-MCNC: <0.2 MG/DL (ref 0–0.3)
BILIRUB SERPL-MCNC: 0.2 MG/DL
EOSINOPHIL # BLD AUTO: 0.1 10E3/UL (ref 0–0.7)
EOSINOPHIL NFR BLD AUTO: 2 %
ERYTHROCYTE [DISTWIDTH] IN BLOOD BY AUTOMATED COUNT: 13.2 % (ref 10–15)
HCT VFR BLD AUTO: 35.8 % (ref 35–47)
HGB BLD-MCNC: 11.6 G/DL (ref 11.7–15.7)
IMM GRANULOCYTES # BLD: 0 10E3/UL
IMM GRANULOCYTES NFR BLD: 1 %
LYMPHOCYTES # BLD AUTO: 1.3 10E3/UL (ref 0.8–5.3)
LYMPHOCYTES NFR BLD AUTO: 29 %
MCH RBC QN AUTO: 30.6 PG (ref 26.5–33)
MCHC RBC AUTO-ENTMCNC: 32.4 G/DL (ref 31.5–36.5)
MCV RBC AUTO: 95 FL (ref 78–100)
MONOCYTES # BLD AUTO: 0.3 10E3/UL (ref 0–1.3)
MONOCYTES NFR BLD AUTO: 7 %
NEUTROPHILS # BLD AUTO: 2.6 10E3/UL (ref 1.6–8.3)
NEUTROPHILS NFR BLD AUTO: 59 %
NRBC # BLD AUTO: 0 10E3/UL
NRBC BLD AUTO-RTO: 0 /100
PLATELET # BLD AUTO: 263 10E3/UL (ref 150–450)
PROT SERPL-MCNC: 7 G/DL (ref 6.4–8.3)
RBC # BLD AUTO: 3.79 10E6/UL (ref 3.8–5.2)
WBC # BLD AUTO: 4.4 10E3/UL (ref 4–11)

## 2024-09-12 PROCEDURE — 96417 CHEMO IV INFUS EACH ADDL SEQ: CPT

## 2024-09-12 PROCEDURE — 96375 TX/PRO/DX INJ NEW DRUG ADDON: CPT

## 2024-09-12 PROCEDURE — 99213 OFFICE O/P EST LOW 20 MIN: CPT | Mod: 25 | Performed by: PHYSICIAN ASSISTANT

## 2024-09-12 PROCEDURE — 36415 COLL VENOUS BLD VENIPUNCTURE: CPT | Performed by: PHYSICIAN ASSISTANT

## 2024-09-12 PROCEDURE — 99213 OFFICE O/P EST LOW 20 MIN: CPT | Performed by: PHYSICIAN ASSISTANT

## 2024-09-12 PROCEDURE — 258N000003 HC RX IP 258 OP 636: Performed by: PHYSICIAN ASSISTANT

## 2024-09-12 PROCEDURE — 80076 HEPATIC FUNCTION PANEL: CPT | Performed by: PHYSICIAN ASSISTANT

## 2024-09-12 PROCEDURE — 96413 CHEMO IV INFUSION 1 HR: CPT

## 2024-09-12 PROCEDURE — 250N000011 HC RX IP 250 OP 636: Performed by: PHYSICIAN ASSISTANT

## 2024-09-12 PROCEDURE — 85025 COMPLETE CBC W/AUTO DIFF WBC: CPT | Performed by: PHYSICIAN ASSISTANT

## 2024-09-12 RX ORDER — EPINEPHRINE 1 MG/ML
0.3 INJECTION, SOLUTION INTRAMUSCULAR; SUBCUTANEOUS EVERY 5 MIN PRN
Status: CANCELLED | OUTPATIENT
Start: 2024-09-12

## 2024-09-12 RX ORDER — METHYLPREDNISOLONE SODIUM SUCCINATE 125 MG/2ML
125 INJECTION, POWDER, LYOPHILIZED, FOR SOLUTION INTRAMUSCULAR; INTRAVENOUS
Status: CANCELLED
Start: 2024-09-12

## 2024-09-12 RX ORDER — HEPARIN SODIUM,PORCINE 10 UNIT/ML
5-20 VIAL (ML) INTRAVENOUS DAILY PRN
Status: CANCELLED | OUTPATIENT
Start: 2024-09-12

## 2024-09-12 RX ORDER — LORAZEPAM 2 MG/ML
0.5 INJECTION INTRAMUSCULAR EVERY 4 HOURS PRN
Status: CANCELLED | OUTPATIENT
Start: 2024-09-12

## 2024-09-12 RX ORDER — ALBUTEROL SULFATE 90 UG/1
1-2 AEROSOL, METERED RESPIRATORY (INHALATION)
Status: CANCELLED
Start: 2024-09-12

## 2024-09-12 RX ORDER — ATROPINE SULFATE 0.4 MG/ML
0.4 AMPUL (ML) INJECTION EVERY 10 MIN PRN
Status: DISCONTINUED | OUTPATIENT
Start: 2024-09-12 | End: 2024-09-12 | Stop reason: HOSPADM

## 2024-09-12 RX ORDER — ATROPINE SULFATE 0.4 MG/ML
0.4 AMPUL (ML) INJECTION EVERY 10 MIN PRN
Status: CANCELLED
Start: 2024-09-12

## 2024-09-12 RX ORDER — DIPHENHYDRAMINE HCL 25 MG
50 CAPSULE ORAL
Status: CANCELLED
Start: 2024-09-12

## 2024-09-12 RX ORDER — ALBUTEROL SULFATE 0.83 MG/ML
2.5 SOLUTION RESPIRATORY (INHALATION)
Status: CANCELLED | OUTPATIENT
Start: 2024-09-12

## 2024-09-12 RX ORDER — HEPARIN SODIUM (PORCINE) LOCK FLUSH IV SOLN 100 UNIT/ML 100 UNIT/ML
5 SOLUTION INTRAVENOUS
Status: DISCONTINUED | OUTPATIENT
Start: 2024-09-12 | End: 2024-09-12 | Stop reason: HOSPADM

## 2024-09-12 RX ORDER — HEPARIN SODIUM (PORCINE) LOCK FLUSH IV SOLN 100 UNIT/ML 100 UNIT/ML
5 SOLUTION INTRAVENOUS
Status: CANCELLED | OUTPATIENT
Start: 2024-09-12

## 2024-09-12 RX ORDER — MEPERIDINE HYDROCHLORIDE 25 MG/ML
25 INJECTION INTRAMUSCULAR; INTRAVENOUS; SUBCUTANEOUS EVERY 30 MIN PRN
Status: CANCELLED | OUTPATIENT
Start: 2024-09-12

## 2024-09-12 RX ORDER — ACETAMINOPHEN 325 MG/1
650 TABLET ORAL
Status: CANCELLED | OUTPATIENT
Start: 2024-09-12

## 2024-09-12 RX ORDER — ONDANSETRON 2 MG/ML
8 INJECTION INTRAMUSCULAR; INTRAVENOUS ONCE
Status: CANCELLED | OUTPATIENT
Start: 2024-09-12

## 2024-09-12 RX ORDER — ONDANSETRON 2 MG/ML
8 INJECTION INTRAMUSCULAR; INTRAVENOUS ONCE
Status: COMPLETED | OUTPATIENT
Start: 2024-09-12 | End: 2024-09-12

## 2024-09-12 RX ORDER — DIPHENHYDRAMINE HYDROCHLORIDE 50 MG/ML
50 INJECTION INTRAMUSCULAR; INTRAVENOUS
Status: CANCELLED
Start: 2024-09-12

## 2024-09-12 RX ADMIN — SODIUM CHLORIDE 250 ML: 9 INJECTION, SOLUTION INTRAVENOUS at 08:48

## 2024-09-12 RX ADMIN — SODIUM CHLORIDE, PRESERVATIVE FREE 5 ML: 5 INJECTION INTRAVENOUS at 11:00

## 2024-09-12 RX ADMIN — ATROPINE SULFATE 0.4 MG: 0.4 INJECTION, SOLUTION INTRAVENOUS at 09:27

## 2024-09-12 RX ADMIN — PACLITAXEL 138 MG: 6 INJECTION, SOLUTION INTRAVENOUS at 09:38

## 2024-09-12 RX ADMIN — SODIUM CHLORIDE 130 MG: 9 INJECTION, SOLUTION INTRAVENOUS at 08:58

## 2024-09-12 RX ADMIN — ONDANSETRON 8 MG: 2 INJECTION INTRAMUSCULAR; INTRAVENOUS at 08:41

## 2024-09-12 ASSESSMENT — PAIN SCALES - GENERAL: PAINLEVEL: NO PAIN (0)

## 2024-09-12 NOTE — PROGRESS NOTES
Nursing Note:  Jenni Torres presents today for port labs.    Patient seen by provider today: Yes: Love Coates PA-C   present during visit today: Not Applicable.    Note: N/A.    Intravenous Access:  Labs drawn without difficulty.  Implanted Port.    Discharge Plan:   Patient was sent to [facility] for provider appointment.    Mandi Lund RN

## 2024-09-12 NOTE — NURSING NOTE
"Oncology Rooming Note    September 12, 2024 7:57 AM   Jenni Torres is a 53 year old female who presents for:    Chief Complaint   Patient presents with    Oncology Clinic Visit     Initial Vitals: /71   Pulse 62   Temp 98  F (36.7  C) (Tympanic)   Resp 16   Wt 65.5 kg (144 lb 6.4 oz)   SpO2 98%   BMI 22.69 kg/m   Estimated body mass index is 22.69 kg/m  as calculated from the following:    Height as of 8/22/24: 1.699 m (5' 6.89\").    Weight as of this encounter: 65.5 kg (144 lb 6.4 oz). Body surface area is 1.76 meters squared.  No Pain (0) Comment: Data Unavailable   No LMP recorded.  Allergies reviewed: Yes  Medications reviewed: Yes    Medications: Medication refills not needed today.  Pharmacy name entered into EPIC: [facility] - [place], [place] - [address]    Frailty Screening:   Is the patient here for a new oncology consult visit in cancer care? 2. No      Clinical concerns:  discuss dental work, scheduling and radiation      Dionna Jackson CMA              "

## 2024-09-12 NOTE — LETTER
9/12/2024      Jenni Torres  [address]  [place] [address]      Dear Colleague,    Thank you for referring your patient, Jenni Torres, to the [facility]. Please see a copy of my visit note below.    Oncology/Hematology Visit Note    Sep 12, 2024    Reason for visit: Follow up breast cancer    Oncology HPI: Jenni Torres is a 53 year old female with right-sided breast cancer, ER/NC positive, HER2 positive s/p right-sided lumpectomy with sentinel lymph node biopsy.  Pathology with invasive ductal carcinoma, grade 2, measured 1.3 cm in greatest dimension and associated DCIS.  All margins were negative on the invasive tumor, but she did have positive margins on the DCIS portion.  All 3 sentinel lymph nodes were negative.    Dr. Mariee recommended Taxol/Herceptin x 12 weeks, then followed by single agent Herceptin for total x 1 year.  Echo on 7/18/2024 with EF of 60-65%.  Taxol/Herceptin C1 D1 completed on 7/25/2024.    She is here today for weekly Taxol/Herceptin C8D1.    Interval History: Jenni is here today unaccompanied.  Jenni is here unaccompanied today.  She continues to tolerate treatment pretty well, but has been having increased abdominal cramping, intermittently throughout her treatment and for the first time last week, the abdominal cramping occurred during infusion.  Intermittent diarrhea, but improved with Imodium.  No vomiting, nausea, headache, vision changes, dizziness or bleeding.  She saw dentist recently and needs a crown, but she will wait till chemo is done.  No other complaints.    Review of Systems: See interval hx. Denies fevers, chills, HA, dizziness, CP, SOB, abdominal pain, NV, changes in urination, bruising, rash.     PMHx and Social Hx reviewed per EPIC.      Medications:  Current Outpatient Medications   Medication Sig Dispense Refill     levETIRAcetam (KEPPRA) 500 MG tablet Take 1 tablet (500 mg) by mouth daily 90 tablet 3      levothyroxine (SYNTHROID/LEVOTHROID) 137 MCG tablet Take 1 tablet (137 mcg) by mouth daily 90 tablet 1     lidocaine-prilocaine (EMLA) 2.5-2.5 % external cream Apply topically as needed for other (apply to port site 30 minutes prior to arrival at infusion center) 30 g 1     Multiple Vitamin (ONE-A-DAY ESSENTIAL) TABS Take 1 tablet by mouth daily       prochlorperazine (COMPAZINE) 10 MG tablet Take 1 tablet (10 mg) by mouth every 6 hours as needed for nausea or vomiting 30 tablet 2     sertraline (ZOLOFT) 50 MG tablet Take 1 tablet by mouth daily. 90 tablet 3     valACYclovir (VALTREX) 1000 mg tablet Take 2 tablets (2,000 mg) by mouth 2 times daily 4 tablet 3     dexAMETHasone (DECADRON) 4 MG tablet Take 2 tablets (8 mg) by mouth daily Take 2 tablets (8mg) the evening before chemotherapy infusion and 2 tablets (8mg) the morning of chemotherapy infusion, for first two chemotherapy infusions. Take with food. (Patient not taking: Reported on 8/22/2024) 8 tablet 0       No Known Allergies      EXAM:    /71   Pulse 62   Temp 98  F (36.7  C) (Tympanic)   Resp 16   Wt 65.5 kg (144 lb 6.4 oz)   SpO2 98%   BMI 22.69 kg/m      GENERAL:  Female, in no acute distress.  Alert and oriented x3. Well groomed.   HEENT:  Normocephalic, atraumatic. No conjunctival injection or eye swelling.   LUNGS:  Nonlabored breathing, no cough or audible wheezing, able to speak full sentences.  MSK: Full ROM UE.    SKIN: No rash on exposed skin.   NEURO: CN grossly intact, speech normal  PSYCH: Mentation appears normal, insight and judgement intact      Labs:    09/12/24 07:40   Albumin 4.4   Protein Total 7.0   Alkaline Phosphatase 59   ALT 12   AST 15   Bilirubin Direct <0.20   Bilirubin Total 0.2   WBC 4.4   Hemoglobin 11.6 (L)   Hematocrit 35.8   Platelet Count 263   RBC Count 3.79 (L)   MCV 95   MCH 30.6   MCHC 32.4   RDW 13.2   % Neutrophils 59   % Lymphocytes 29   % Monocytes 7   % Eosinophils 2   % Basophils 1   Absolute  Basophils 0.1   Absolute Eosinophils 0.1   Absolute Immature Granulocytes 0.0   Absolute Lymphocytes 1.3   Absolute Monocytes 0.3   % Immature Granulocytes 1   Absolute Neutrophils 2.6   Absolute NRBCs 0.0   NRBCs per 100 WBC 0     Imaging: n/a    Impression/Plan: Jenni Torres is a 53 year old female with right-sided ER/OR positive, HER2 positive breast cancer s/p lumpectomy currently on adjuvant Taxol/Herceptin.    Breast cancer: Invasive ductal carcinoma, grade 2, 1.3 cm with associated DCIS.  All margins were negative on the invasive tumor, but she did have positive margin on the DCIS.  All 3 sentinel lymph nodes negative.  She is also s/p right-sided lumpectomy and this is healing well.  She started adjuvant Taxol Herceptin with C1 D1 completed on 7/25/2024.  She is tolerating this really well.  Slight anemia at 11.6, but WBC/ANC look great and platelets are normal.  Continue treatment weekly, provider visits every 2-3 weeks.  Okay to proceed with Taxol/Herceptin week 8 today.  Plan total 12 weeks, then switching over to Herceptin to complete a year. With Dr. Mariee's upcoming departure, she will establish care with Dr. Tomas.  Echo 7/18/2024 normal, she will be due mid October 2024.  -Rad Onc referral  -9/24 Dr Tomas, TH cycle 10    Abdominal cramping: Initially occurring after treatment, but last week, it occurred during infusion.  Atropine was added to the plan and recommended she try that during infusion today.    Nausea: Secondary to chemotherapy, likely Taxol.  She has Compazine and this is working well for her.  She only has to take it occasionally.    Insomnia: Improved with melatonin.      Chart documentation with Dragon Voice recognition Software. Although reviewed after completion, some words and grammatical errors may remain.    20 minutes spent on the date of the encounter doing chart review, review of test results, interpretation of tests, patient visit, and documentation     Love  Jaxon GAN  Hematology/Oncology  [facility]                  Again, thank you for allowing me to participate in the care of your patient.        Sincerely,        Love Coates PA-C

## 2024-09-12 NOTE — PROGRESS NOTES
Infusion Nursing Note:    Jenni Torres presents today for D1C8 Paclitaxel + Trastuzumab    Pre-meds: 8mg PIV zofran + 0.4mg Atropine PIV right before the taxol infusion.      Patient seen by provider today: Yes: Love Coates PA-C     present during visit today: Not Applicable.    Note: Will plan to give atropine right before the taxol starts as her cramping seems to be during the taxol infusion.      Intravenous Access:  Implanted Port.    Treatment Conditions:  Lab Results   Component Value Date    HGB 11.6 (L) 09/12/2024    WBC 4.4 09/12/2024    ANEUTAUTO 2.6 09/12/2024     09/12/2024        Lab Results   Component Value Date    BILITOTAL 0.2 09/12/2024    ALBUMIN 4.4 09/12/2024    ALT 12 09/12/2024    AST 15 09/12/2024       Results reviewed, labs MET treatment parameters, ok to proceed with treatment.      Post Infusion Assessment:  Patient tolerated infusion without incident.  Blood return noted pre and post infusion.  Site patent and intact, free from redness, edema or discomfort.  No evidence of extravasations.  Access discontinued per protocol.       Discharge Plan:   Discharge instructions reviewed with: Patient.  Patient and/or family verbalized understanding of discharge instructions and all questions answered.  AVS to patient via [facility].  Patient will return 9/19 for next appointment.   Patient discharged in stable condition accompanied by: self.  Departure Mode: Ambulatory.      Marivel Paris RN

## 2024-09-18 RX ORDER — ALBUTEROL SULFATE 90 UG/1
1-2 AEROSOL, METERED RESPIRATORY (INHALATION)
Status: CANCELLED
Start: 2024-09-19

## 2024-09-18 RX ORDER — EPINEPHRINE 1 MG/ML
0.3 INJECTION, SOLUTION INTRAMUSCULAR; SUBCUTANEOUS EVERY 5 MIN PRN
Status: CANCELLED | OUTPATIENT
Start: 2024-09-19

## 2024-09-18 RX ORDER — MEPERIDINE HYDROCHLORIDE 25 MG/ML
25 INJECTION INTRAMUSCULAR; INTRAVENOUS; SUBCUTANEOUS EVERY 30 MIN PRN
Status: CANCELLED | OUTPATIENT
Start: 2024-09-19

## 2024-09-18 RX ORDER — LORAZEPAM 2 MG/ML
0.5 INJECTION INTRAMUSCULAR EVERY 4 HOURS PRN
Status: CANCELLED | OUTPATIENT
Start: 2024-09-19

## 2024-09-18 RX ORDER — ACETAMINOPHEN 325 MG/1
650 TABLET ORAL
Status: CANCELLED | OUTPATIENT
Start: 2024-09-19

## 2024-09-18 RX ORDER — HEPARIN SODIUM (PORCINE) LOCK FLUSH IV SOLN 100 UNIT/ML 100 UNIT/ML
5 SOLUTION INTRAVENOUS
Status: CANCELLED | OUTPATIENT
Start: 2024-09-19

## 2024-09-18 RX ORDER — METHYLPREDNISOLONE SODIUM SUCCINATE 125 MG/2ML
125 INJECTION, POWDER, LYOPHILIZED, FOR SOLUTION INTRAMUSCULAR; INTRAVENOUS
Status: CANCELLED
Start: 2024-09-19

## 2024-09-18 RX ORDER — ATROPINE SULFATE 0.4 MG/ML
0.4 AMPUL (ML) INJECTION EVERY 10 MIN PRN
Status: CANCELLED
Start: 2024-09-19

## 2024-09-18 RX ORDER — ONDANSETRON 2 MG/ML
8 INJECTION INTRAMUSCULAR; INTRAVENOUS ONCE
Status: CANCELLED | OUTPATIENT
Start: 2024-09-19

## 2024-09-18 RX ORDER — ALBUTEROL SULFATE 0.83 MG/ML
2.5 SOLUTION RESPIRATORY (INHALATION)
Status: CANCELLED | OUTPATIENT
Start: 2024-09-19

## 2024-09-18 RX ORDER — DIPHENHYDRAMINE HCL 25 MG
50 CAPSULE ORAL
Status: CANCELLED
Start: 2024-09-19

## 2024-09-18 RX ORDER — DIPHENHYDRAMINE HYDROCHLORIDE 50 MG/ML
50 INJECTION INTRAMUSCULAR; INTRAVENOUS
Status: CANCELLED
Start: 2024-09-19

## 2024-09-18 RX ORDER — HEPARIN SODIUM,PORCINE 10 UNIT/ML
5-20 VIAL (ML) INTRAVENOUS DAILY PRN
Status: CANCELLED | OUTPATIENT
Start: 2024-09-19

## 2024-09-19 ENCOUNTER — INFUSION THERAPY VISIT (OUTPATIENT)
Dept: INFUSION THERAPY | Facility: CLINIC | Age: 54
End: 2024-09-19
Attending: PHYSICIAN ASSISTANT
Payer: COMMERCIAL

## 2024-09-19 VITALS
DIASTOLIC BLOOD PRESSURE: 69 MMHG | SYSTOLIC BLOOD PRESSURE: 99 MMHG | HEART RATE: 69 BPM | WEIGHT: 145.7 LBS | RESPIRATION RATE: 16 BRPM | BODY MASS INDEX: 22.89 KG/M2 | OXYGEN SATURATION: 98 % | TEMPERATURE: 98.3 F

## 2024-09-19 DIAGNOSIS — Z17.0 MALIGNANT NEOPLASM OF UPPER-OUTER QUADRANT OF RIGHT BREAST IN FEMALE, ESTROGEN RECEPTOR POSITIVE (H): Primary | ICD-10-CM

## 2024-09-19 DIAGNOSIS — C50.411 MALIGNANT NEOPLASM OF UPPER-OUTER QUADRANT OF RIGHT BREAST IN FEMALE, ESTROGEN RECEPTOR POSITIVE (H): Primary | ICD-10-CM

## 2024-09-19 LAB
BASOPHILS # BLD AUTO: 0.1 10E3/UL (ref 0–0.2)
BASOPHILS NFR BLD AUTO: 1 %
EOSINOPHIL # BLD AUTO: 0.1 10E3/UL (ref 0–0.7)
EOSINOPHIL NFR BLD AUTO: 2 %
ERYTHROCYTE [DISTWIDTH] IN BLOOD BY AUTOMATED COUNT: 13.3 % (ref 10–15)
HCT VFR BLD AUTO: 35.3 % (ref 35–47)
HGB BLD-MCNC: 11.3 G/DL (ref 11.7–15.7)
IMM GRANULOCYTES # BLD: 0 10E3/UL
IMM GRANULOCYTES NFR BLD: 1 %
LYMPHOCYTES # BLD AUTO: 1 10E3/UL (ref 0.8–5.3)
LYMPHOCYTES NFR BLD AUTO: 22 %
MCH RBC QN AUTO: 30.5 PG (ref 26.5–33)
MCHC RBC AUTO-ENTMCNC: 32 G/DL (ref 31.5–36.5)
MCV RBC AUTO: 95 FL (ref 78–100)
MONOCYTES # BLD AUTO: 0.3 10E3/UL (ref 0–1.3)
MONOCYTES NFR BLD AUTO: 7 %
NEUTROPHILS # BLD AUTO: 3.1 10E3/UL (ref 1.6–8.3)
NEUTROPHILS NFR BLD AUTO: 68 %
NRBC # BLD AUTO: 0 10E3/UL
NRBC BLD AUTO-RTO: 0 /100
PLATELET # BLD AUTO: 255 10E3/UL (ref 150–450)
RBC # BLD AUTO: 3.71 10E6/UL (ref 3.8–5.2)
WBC # BLD AUTO: 4.7 10E3/UL (ref 4–11)

## 2024-09-19 PROCEDURE — 96375 TX/PRO/DX INJ NEW DRUG ADDON: CPT

## 2024-09-19 PROCEDURE — 85025 COMPLETE CBC W/AUTO DIFF WBC: CPT | Performed by: PHYSICIAN ASSISTANT

## 2024-09-19 PROCEDURE — 96417 CHEMO IV INFUS EACH ADDL SEQ: CPT

## 2024-09-19 PROCEDURE — 36415 COLL VENOUS BLD VENIPUNCTURE: CPT | Performed by: PHYSICIAN ASSISTANT

## 2024-09-19 PROCEDURE — 250N000011 HC RX IP 250 OP 636: Performed by: PHYSICIAN ASSISTANT

## 2024-09-19 PROCEDURE — 258N000003 HC RX IP 258 OP 636: Performed by: PHYSICIAN ASSISTANT

## 2024-09-19 PROCEDURE — 96413 CHEMO IV INFUSION 1 HR: CPT

## 2024-09-19 RX ORDER — HEPARIN SODIUM (PORCINE) LOCK FLUSH IV SOLN 100 UNIT/ML 100 UNIT/ML
5 SOLUTION INTRAVENOUS
Status: DISCONTINUED | OUTPATIENT
Start: 2024-09-19 | End: 2024-09-19 | Stop reason: HOSPADM

## 2024-09-19 RX ORDER — ONDANSETRON 2 MG/ML
8 INJECTION INTRAMUSCULAR; INTRAVENOUS ONCE
Status: COMPLETED | OUTPATIENT
Start: 2024-09-19 | End: 2024-09-19

## 2024-09-19 RX ORDER — ATROPINE SULFATE 0.4 MG/ML
0.4 AMPUL (ML) INJECTION EVERY 10 MIN PRN
Status: DISCONTINUED | OUTPATIENT
Start: 2024-09-19 | End: 2024-09-19 | Stop reason: HOSPADM

## 2024-09-19 RX ADMIN — SODIUM CHLORIDE 130 MG: 9 INJECTION, SOLUTION INTRAVENOUS at 09:56

## 2024-09-19 RX ADMIN — SODIUM CHLORIDE 250 ML: 9 INJECTION, SOLUTION INTRAVENOUS at 09:38

## 2024-09-19 RX ADMIN — ONDANSETRON 8 MG: 2 INJECTION INTRAMUSCULAR; INTRAVENOUS at 09:40

## 2024-09-19 RX ADMIN — PACLITAXEL 138 MG: 6 INJECTION, SOLUTION INTRAVENOUS at 10:35

## 2024-09-19 RX ADMIN — ATROPINE SULFATE 0.4 MG: 0.4 INJECTION, SOLUTION INTRAVENOUS at 10:33

## 2024-09-19 RX ADMIN — Medication 5 ML: at 11:45

## 2024-09-19 NOTE — PROGRESS NOTES
"Infusion Nursing Note:  Jenni Torres presents today for Labs per Port + Cycle 9 Taxol + Trazimera.    Patient seen by provider today: No   present during visit today: Not Applicable.    Note:  Patient reports is feeling well and offers no new issues or concerns today.  Patient denies fevers, chills or signs of infection.      Atropine 0.4mg IV given prior to Taxol today due to issues with cramping during and after Taxol infusion (Atropine added on 9/12/24 per Love Coates PA-C).  -Patient reports no issues with cramping during Taxol infusion since Atropine started.  Patient continues with \"mild\" cramping off/on during week after Taxol infusion.      **Patient uses ice on hands and feet during Taxol infusion**.    Intravenous Access:  Labs drawn without difficulty.  Implanted Port.  Port site C/D/I.  Port flushes well + excellent blood return.    Treatment Conditions:  Lab Results   Component Value Date    HGB 11.3 (L) 09/19/2024    WBC 4.7 09/19/2024    ANEU 5.3 11/22/2018    ANEUTAUTO 3.1 09/19/2024     09/19/2024        Lab Results   Component Value Date     06/19/2024    POTASSIUM 3.9 06/19/2024    CR 0.89 06/19/2024    LEDA 9.1 06/19/2024    BILITOTAL 0.2 09/12/2024    ALBUMIN 4.4 09/12/2024    ALT 12 09/12/2024    AST 15 09/12/2024       ECHO/MUGA completed on 7/18/2024.  EF = 60 %-65%.      Post Infusion Assessment:  Patient tolerated infusion without incident.  Blood return noted pre and post infusion.  Site patent and intact, free from redness, edema or discomfort.  No evidence of extravasations.  Access discontinued per protocol.       Discharge Plan:   Discharge instructions reviewed with: Patient.  Patient and/or family verbalized understanding of discharge instructions and all questions answered.  Patient discharged in stable condition accompanied by: self.  Departure Mode: Ambulatory.  9/24/24:  Labs per Port + Appointment with Dr. Tomas.  9/26/24:  Cycle 10 Trazimera + " Taxol.    Maren Torre RN, BSN, OCN on 9/19/2024 at 12:35 PM

## 2024-09-24 ENCOUNTER — ONCOLOGY VISIT (OUTPATIENT)
Dept: ONCOLOGY | Facility: CLINIC | Age: 54
End: 2024-09-24
Attending: INTERNAL MEDICINE
Payer: COMMERCIAL

## 2024-09-24 ENCOUNTER — LAB (OUTPATIENT)
Dept: INFUSION THERAPY | Facility: CLINIC | Age: 54
End: 2024-09-24
Attending: INTERNAL MEDICINE
Payer: COMMERCIAL

## 2024-09-24 VITALS
SYSTOLIC BLOOD PRESSURE: 107 MMHG | RESPIRATION RATE: 18 BRPM | WEIGHT: 146 LBS | DIASTOLIC BLOOD PRESSURE: 72 MMHG | OXYGEN SATURATION: 97 % | HEART RATE: 72 BPM | TEMPERATURE: 98.4 F | BODY MASS INDEX: 22.94 KG/M2

## 2024-09-24 DIAGNOSIS — Z17.0 MALIGNANT NEOPLASM OF UPPER-OUTER QUADRANT OF RIGHT BREAST IN FEMALE, ESTROGEN RECEPTOR POSITIVE (H): Primary | ICD-10-CM

## 2024-09-24 DIAGNOSIS — C50.411 MALIGNANT NEOPLASM OF UPPER-OUTER QUADRANT OF RIGHT BREAST IN FEMALE, ESTROGEN RECEPTOR POSITIVE (H): Primary | ICD-10-CM

## 2024-09-24 DIAGNOSIS — Z17.0 MALIGNANT NEOPLASM OF UPPER-OUTER QUADRANT OF RIGHT BREAST IN FEMALE, ESTROGEN RECEPTOR POSITIVE (H): ICD-10-CM

## 2024-09-24 DIAGNOSIS — C50.411 MALIGNANT NEOPLASM OF UPPER-OUTER QUADRANT OF RIGHT BREAST IN FEMALE, ESTROGEN RECEPTOR POSITIVE (H): ICD-10-CM

## 2024-09-24 LAB
ALBUMIN SERPL BCG-MCNC: 4.6 G/DL (ref 3.5–5.2)
ALP SERPL-CCNC: 63 U/L (ref 40–150)
ALT SERPL W P-5'-P-CCNC: 13 U/L (ref 0–50)
ANION GAP SERPL CALCULATED.3IONS-SCNC: 11 MMOL/L (ref 7–15)
AST SERPL W P-5'-P-CCNC: 19 U/L (ref 0–45)
BASOPHILS # BLD AUTO: 0.1 10E3/UL (ref 0–0.2)
BASOPHILS NFR BLD AUTO: 1 %
BILIRUB SERPL-MCNC: 0.3 MG/DL
BUN SERPL-MCNC: 12.6 MG/DL (ref 6–20)
CALCIUM SERPL-MCNC: 9.2 MG/DL (ref 8.8–10.4)
CHLORIDE SERPL-SCNC: 104 MMOL/L (ref 98–107)
CREAT SERPL-MCNC: 0.88 MG/DL (ref 0.51–0.95)
EGFRCR SERPLBLD CKD-EPI 2021: 79 ML/MIN/1.73M2
EOSINOPHIL # BLD AUTO: 0.1 10E3/UL (ref 0–0.7)
EOSINOPHIL NFR BLD AUTO: 2 %
ERYTHROCYTE [DISTWIDTH] IN BLOOD BY AUTOMATED COUNT: 13.4 % (ref 10–15)
GLUCOSE SERPL-MCNC: 117 MG/DL (ref 70–99)
HCO3 SERPL-SCNC: 25 MMOL/L (ref 22–29)
HCT VFR BLD AUTO: 35.8 % (ref 35–47)
HGB BLD-MCNC: 11.7 G/DL (ref 11.7–15.7)
IMM GRANULOCYTES # BLD: 0 10E3/UL
IMM GRANULOCYTES NFR BLD: 0 %
LYMPHOCYTES # BLD AUTO: 1.3 10E3/UL (ref 0.8–5.3)
LYMPHOCYTES NFR BLD AUTO: 23 %
MCH RBC QN AUTO: 30.9 PG (ref 26.5–33)
MCHC RBC AUTO-ENTMCNC: 32.7 G/DL (ref 31.5–36.5)
MCV RBC AUTO: 95 FL (ref 78–100)
MONOCYTES # BLD AUTO: 0.2 10E3/UL (ref 0–1.3)
MONOCYTES NFR BLD AUTO: 4 %
NEUTROPHILS # BLD AUTO: 3.9 10E3/UL (ref 1.6–8.3)
NEUTROPHILS NFR BLD AUTO: 70 %
NRBC # BLD AUTO: 0 10E3/UL
NRBC BLD AUTO-RTO: 0 /100
PLATELET # BLD AUTO: 273 10E3/UL (ref 150–450)
POTASSIUM SERPL-SCNC: 4.1 MMOL/L (ref 3.4–5.3)
PROT SERPL-MCNC: 7.3 G/DL (ref 6.4–8.3)
RBC # BLD AUTO: 3.79 10E6/UL (ref 3.8–5.2)
SODIUM SERPL-SCNC: 140 MMOL/L (ref 135–145)
WBC # BLD AUTO: 5.5 10E3/UL (ref 4–11)

## 2024-09-24 PROCEDURE — 99214 OFFICE O/P EST MOD 30 MIN: CPT | Performed by: INTERNAL MEDICINE

## 2024-09-24 PROCEDURE — 84450 TRANSFERASE (AST) (SGOT): CPT | Performed by: INTERNAL MEDICINE

## 2024-09-24 PROCEDURE — 36415 COLL VENOUS BLD VENIPUNCTURE: CPT

## 2024-09-24 PROCEDURE — 85041 AUTOMATED RBC COUNT: CPT | Performed by: INTERNAL MEDICINE

## 2024-09-24 PROCEDURE — 82040 ASSAY OF SERUM ALBUMIN: CPT | Performed by: INTERNAL MEDICINE

## 2024-09-24 PROCEDURE — 99213 OFFICE O/P EST LOW 20 MIN: CPT | Performed by: INTERNAL MEDICINE

## 2024-09-24 RX ORDER — METHYLPREDNISOLONE SODIUM SUCCINATE 125 MG/2ML
125 INJECTION, POWDER, LYOPHILIZED, FOR SOLUTION INTRAMUSCULAR; INTRAVENOUS
Status: CANCELLED
Start: 2024-09-24

## 2024-09-24 RX ORDER — HEPARIN SODIUM,PORCINE 10 UNIT/ML
5-20 VIAL (ML) INTRAVENOUS DAILY PRN
Status: CANCELLED | OUTPATIENT
Start: 2024-10-03

## 2024-09-24 RX ORDER — ALBUTEROL SULFATE 90 UG/1
1-2 AEROSOL, METERED RESPIRATORY (INHALATION)
Start: 2024-10-11

## 2024-09-24 RX ORDER — ATROPINE SULFATE 0.4 MG/ML
0.4 AMPUL (ML) INJECTION EVERY 10 MIN PRN
Status: CANCELLED
Start: 2024-10-03

## 2024-09-24 RX ORDER — DIPHENHYDRAMINE HYDROCHLORIDE 50 MG/ML
50 INJECTION INTRAMUSCULAR; INTRAVENOUS
Status: CANCELLED
Start: 2024-10-03

## 2024-09-24 RX ORDER — LORAZEPAM 2 MG/ML
0.5 INJECTION INTRAMUSCULAR EVERY 4 HOURS PRN
OUTPATIENT
Start: 2024-10-11

## 2024-09-24 RX ORDER — DIPHENHYDRAMINE HCL 25 MG
50 CAPSULE ORAL
Start: 2024-10-11

## 2024-09-24 RX ORDER — HEPARIN SODIUM (PORCINE) LOCK FLUSH IV SOLN 100 UNIT/ML 100 UNIT/ML
5 SOLUTION INTRAVENOUS
OUTPATIENT
Start: 2024-10-11

## 2024-09-24 RX ORDER — ALBUTEROL SULFATE 90 UG/1
1-2 AEROSOL, METERED RESPIRATORY (INHALATION)
Status: CANCELLED
Start: 2024-10-03

## 2024-09-24 RX ORDER — ATROPINE SULFATE 0.4 MG/ML
0.4 AMPUL (ML) INJECTION EVERY 10 MIN PRN
Status: CANCELLED
Start: 2024-09-24

## 2024-09-24 RX ORDER — ACETAMINOPHEN 325 MG/1
650 TABLET ORAL
Status: CANCELLED | OUTPATIENT
Start: 2024-09-24

## 2024-09-24 RX ORDER — MEPERIDINE HYDROCHLORIDE 25 MG/ML
25 INJECTION INTRAMUSCULAR; INTRAVENOUS; SUBCUTANEOUS EVERY 30 MIN PRN
OUTPATIENT
Start: 2024-10-11

## 2024-09-24 RX ORDER — ALBUTEROL SULFATE 0.83 MG/ML
2.5 SOLUTION RESPIRATORY (INHALATION)
OUTPATIENT
Start: 2024-10-11

## 2024-09-24 RX ORDER — EPINEPHRINE 1 MG/ML
0.3 INJECTION, SOLUTION INTRAMUSCULAR; SUBCUTANEOUS EVERY 5 MIN PRN
Status: CANCELLED | OUTPATIENT
Start: 2024-10-03

## 2024-09-24 RX ORDER — ONDANSETRON 2 MG/ML
8 INJECTION INTRAMUSCULAR; INTRAVENOUS ONCE
OUTPATIENT
Start: 2024-10-11

## 2024-09-24 RX ORDER — ACETAMINOPHEN 325 MG/1
650 TABLET ORAL
Status: CANCELLED | OUTPATIENT
Start: 2024-10-03

## 2024-09-24 RX ORDER — METHYLPREDNISOLONE SODIUM SUCCINATE 125 MG/2ML
125 INJECTION, POWDER, LYOPHILIZED, FOR SOLUTION INTRAMUSCULAR; INTRAVENOUS
Start: 2024-10-11

## 2024-09-24 RX ORDER — DIPHENHYDRAMINE HYDROCHLORIDE 50 MG/ML
50 INJECTION INTRAMUSCULAR; INTRAVENOUS
Status: CANCELLED
Start: 2024-09-24

## 2024-09-24 RX ORDER — HEPARIN SODIUM (PORCINE) LOCK FLUSH IV SOLN 100 UNIT/ML 100 UNIT/ML
5 SOLUTION INTRAVENOUS
Status: CANCELLED | OUTPATIENT
Start: 2024-10-03

## 2024-09-24 RX ORDER — HEPARIN SODIUM (PORCINE) LOCK FLUSH IV SOLN 100 UNIT/ML 100 UNIT/ML
5 SOLUTION INTRAVENOUS
Status: DISCONTINUED | OUTPATIENT
Start: 2024-09-24 | End: 2024-09-24 | Stop reason: HOSPADM

## 2024-09-24 RX ORDER — LORAZEPAM 2 MG/ML
0.5 INJECTION INTRAMUSCULAR EVERY 4 HOURS PRN
Status: CANCELLED | OUTPATIENT
Start: 2024-10-03

## 2024-09-24 RX ORDER — METHYLPREDNISOLONE SODIUM SUCCINATE 125 MG/2ML
125 INJECTION, POWDER, LYOPHILIZED, FOR SOLUTION INTRAMUSCULAR; INTRAVENOUS
Status: CANCELLED
Start: 2024-10-03

## 2024-09-24 RX ORDER — ATROPINE SULFATE 0.4 MG/ML
0.4 AMPUL (ML) INJECTION EVERY 10 MIN PRN
Start: 2024-10-11

## 2024-09-24 RX ORDER — DIPHENHYDRAMINE HCL 25 MG
50 CAPSULE ORAL
Status: CANCELLED
Start: 2024-09-24

## 2024-09-24 RX ORDER — ACETAMINOPHEN 325 MG/1
650 TABLET ORAL
OUTPATIENT
Start: 2024-10-11

## 2024-09-24 RX ORDER — MEPERIDINE HYDROCHLORIDE 25 MG/ML
25 INJECTION INTRAMUSCULAR; INTRAVENOUS; SUBCUTANEOUS EVERY 30 MIN PRN
Status: CANCELLED | OUTPATIENT
Start: 2024-09-24

## 2024-09-24 RX ORDER — HEPARIN SODIUM,PORCINE 10 UNIT/ML
5-20 VIAL (ML) INTRAVENOUS DAILY PRN
OUTPATIENT
Start: 2024-10-11

## 2024-09-24 RX ORDER — MEPERIDINE HYDROCHLORIDE 25 MG/ML
25 INJECTION INTRAMUSCULAR; INTRAVENOUS; SUBCUTANEOUS EVERY 30 MIN PRN
Status: CANCELLED | OUTPATIENT
Start: 2024-10-03

## 2024-09-24 RX ORDER — ONDANSETRON 2 MG/ML
8 INJECTION INTRAMUSCULAR; INTRAVENOUS ONCE
Status: CANCELLED | OUTPATIENT
Start: 2024-10-03

## 2024-09-24 RX ORDER — DIPHENHYDRAMINE HYDROCHLORIDE 50 MG/ML
50 INJECTION INTRAMUSCULAR; INTRAVENOUS
Start: 2024-10-11

## 2024-09-24 RX ORDER — ALBUTEROL SULFATE 0.83 MG/ML
2.5 SOLUTION RESPIRATORY (INHALATION)
Status: CANCELLED | OUTPATIENT
Start: 2024-09-24

## 2024-09-24 RX ORDER — HEPARIN SODIUM,PORCINE 10 UNIT/ML
5-20 VIAL (ML) INTRAVENOUS DAILY PRN
Status: CANCELLED | OUTPATIENT
Start: 2024-09-24

## 2024-09-24 RX ORDER — ONDANSETRON 2 MG/ML
8 INJECTION INTRAMUSCULAR; INTRAVENOUS ONCE
Status: CANCELLED | OUTPATIENT
Start: 2024-09-26

## 2024-09-24 RX ORDER — EPINEPHRINE 1 MG/ML
0.3 INJECTION, SOLUTION INTRAMUSCULAR; SUBCUTANEOUS EVERY 5 MIN PRN
OUTPATIENT
Start: 2024-10-11

## 2024-09-24 RX ORDER — LORAZEPAM 2 MG/ML
0.5 INJECTION INTRAMUSCULAR EVERY 4 HOURS PRN
Status: CANCELLED | OUTPATIENT
Start: 2024-09-24

## 2024-09-24 RX ORDER — ALBUTEROL SULFATE 90 UG/1
1-2 AEROSOL, METERED RESPIRATORY (INHALATION)
Status: CANCELLED
Start: 2024-09-24

## 2024-09-24 RX ORDER — DIPHENHYDRAMINE HCL 25 MG
50 CAPSULE ORAL
Status: CANCELLED
Start: 2024-10-03

## 2024-09-24 RX ORDER — EPINEPHRINE 1 MG/ML
0.3 INJECTION, SOLUTION INTRAMUSCULAR; SUBCUTANEOUS EVERY 5 MIN PRN
Status: CANCELLED | OUTPATIENT
Start: 2024-09-24

## 2024-09-24 RX ORDER — ALBUTEROL SULFATE 0.83 MG/ML
2.5 SOLUTION RESPIRATORY (INHALATION)
Status: CANCELLED | OUTPATIENT
Start: 2024-10-03

## 2024-09-24 ASSESSMENT — PAIN SCALES - GENERAL: PAINLEVEL: NO PAIN (0)

## 2024-09-24 NOTE — NURSING NOTE
"Oncology Rooming Note    September 24, 2024 1:51 PM   Jenni Torres is a 53 year old female who presents for:    Chief Complaint   Patient presents with    Oncology Clinic Visit     Initial Vitals: /72   Pulse 72   Temp 98.4  F (36.9  C) (Temporal)   Resp 18   Wt 66.2 kg (146 lb)   SpO2 97%   BMI 22.94 kg/m   Estimated body mass index is 22.94 kg/m  as calculated from the following:    Height as of 8/22/24: 1.699 m (5' 6.89\").    Weight as of this encounter: 66.2 kg (146 lb). Body surface area is 1.77 meters squared.  No Pain (0) Comment: Data Unavailable   No LMP recorded.  Allergies reviewed: Yes  Medications reviewed: Yes    Medications: Medication refills not needed today.  Pharmacy name entered into EPIC: [facility] - [place], [place] - [address]    Frailty Screening:   Is the patient here for a new oncology consult visit in cancer care? 2. No      Clinical concerns: f/u       Dionna Jackson CMA              "

## 2024-09-24 NOTE — PROGRESS NOTES
Nursing Note:    Jenni Torres presents today for Lab draw.      Patient seen by provider today: Yes: Dr. Tomas     present during visit today: Not Applicable.    Note: arm draw today as she forgot the lidocaine.    Intravenous Access:  Lab draw site LAC, Needle type butterfly, Gauge 23.  Labs drawn without difficulty.    Discharge Plan:   Patient was sent to [facility] for provider appointment.    Marivel Paris RN

## 2024-09-24 NOTE — LETTER
2024      Jenni BOWENS Sallykierra  [address]  [place] [address]      Dear Colleague,    Thank you for referring your patient, Jenni Torres, to the [facility]. Please see a copy of my visit note below.    Oncology consultation    Name: Jenni Torres  : 1970  MRN: [id number]  Date of Service: 2024    CC: Breast Cancer    HPI: Jenni Torres is a 53 year old female with a right pT1cN0 hormone receptor positive, HER2 positive breast cancer s/p lumpectomy and currently on week 10 adjuvant Taxol/Herceptin here for transfer of care.    She was previously cared for by Dr. Mariee.  Her cancer history is below and reviewed. Prior medical oncology notes reviewed.     No neuropathy. Some fatigue. Some abdominal cramping and diarrhea. Improved with atropine. Some vaginal dryness as well, plans to try an OTC.  LMP in 2024, no hot flashes. Seeing Radiation Oncology 10/31 and Genetics .  Concerns about echo coverage    History  24: right lumpectomy 1.3cm positive DCIS margins. 0/3 LN   24 re-excision: negative margins  24: week 1 Taxol/Herceptin  24: JORGITO  24: week 10 Taxol/Herceptin    Review of systems: All other systems reviewed and are negative except for what is described in the history of present illness.      PMH:   Patient Active Problem List    Diagnosis Date Noted     Malignant neoplasm of upper-outer quadrant of right breast in female, estrogen receptor positive (H) 2024     Priority: Medium     Rotator cuff tendonitis, right 2024     Priority: Medium     Abnormal auditory perception 2011     Priority: Medium     Tinnitus 2011     Priority: Medium     Vitamin D deficiency 2010     Priority: Medium     Recurrent cold sores 12/10/2007     Priority: Medium     Dysthymic disorder 2007     Priority: Medium     Partial seizures (H) 2007     Priority: Medium     Formatting of this note might be  different from the original.  Last seizure 2006 before starting medicine       Acquired hypothyroidism 04/04/2006     Priority: Medium     Allergic rhinitis 04/04/2006     Priority: Medium     Pain in joint 04/04/2006     Priority: Medium     Premenstrual tension syndrome 04/04/2006     Priority: Medium     Past Medical History:   Diagnosis Date     Hypothyroidism      Kidney stone 11/2018     Seizures (H)        Medications:   Current Outpatient Medications:      dexAMETHasone (DECADRON) 4 MG tablet, Take 2 tablets (8 mg) by mouth daily Take 2 tablets (8mg) the evening before chemotherapy infusion and 2 tablets (8mg) the morning of chemotherapy infusion, for first two chemotherapy infusions. Take with food., Disp: 8 tablet, Rfl: 0     levETIRAcetam (KEPPRA) 500 MG tablet, Take 1 tablet (500 mg) by mouth daily, Disp: 90 tablet, Rfl: 3     levothyroxine (SYNTHROID/LEVOTHROID) 137 MCG tablet, Take 1 tablet (137 mcg) by mouth daily, Disp: 90 tablet, Rfl: 1     lidocaine-prilocaine (EMLA) 2.5-2.5 % external cream, Apply topically as needed for other (apply to port site 30 minutes prior to arrival at infusion center), Disp: 30 g, Rfl: 1     Multiple Vitamin (ONE-A-DAY ESSENTIAL) TABS, Take 1 tablet by mouth daily, Disp: , Rfl:      prochlorperazine (COMPAZINE) 10 MG tablet, Take 1 tablet (10 mg) by mouth every 6 hours as needed for nausea or vomiting, Disp: 30 tablet, Rfl: 2     sertraline (ZOLOFT) 50 MG tablet, Take 1 tablet by mouth daily., Disp: 90 tablet, Rfl: 3     valACYclovir (VALTREX) 1000 mg tablet, Take 2 tablets (2,000 mg) by mouth 2 times daily, Disp: 4 tablet, Rfl: 3  No current facility-administered medications for this visit.    Facility-Administered Medications Ordered in Other Visits:      heparin lock flush 100 unit/mL injection 5 mL, 5 mL, Intracatheter, Once PRN, Christiana Tomas MD     sodium chloride (PF) 0.9% PF flush 3-20 mL, 3-20 mL, Intracatheter, q1 min prn, Christiana Tomas,  MD    Allergies: No Known Allergies    Family history:  Family History   Problem Relation Age of Onset     Hypertension Mother      Hyperlipidemia Mother      Obesity Mother      Hypertension Father      Hyperlipidemia Father      Asthma Maternal Grandmother      Obesity Maternal Grandmother      Prostate Cancer Paternal Grandfather      Breast Cancer Paternal Grandmother      Thyroid Disease Paternal Grandmother        Physical exam:  Vitals:/72   Pulse 72   Temp 98.4  F (36.9  C) (Temporal)   Resp 18   Wt 66.2 kg (146 lb)   SpO2 97%   BMI 22.94 kg/m    GENERAL: No acute distress, pleasant, PS 1  EYES: Sclera anicteric  NECK: Supple  LYMPH NODES: No cervical, supraclavicular lymphadenopathy  CARDIOVASCULAR: Regular rate and rhythm  LUNGS: Clear to auscultation bilaterally  GASTROINTESTINAL: Soft, nontender, nondistended.  No palpable hepatosplenomegaly  EXTREMITIES: No edema  SKIN: No rashes or petechiae, port clear  BACK: No midline tenderness  NEURO: Normal gait    Labs:  Lab Results   Component Value Date    WBC 5.5 09/24/2024    HGB 11.7 09/24/2024    HCT 35.8 09/24/2024     09/24/2024     09/24/2024    POTASSIUM 4.1 09/24/2024    CHLORIDE 104 09/24/2024    CO2 25 09/24/2024    BUN 12.6 09/24/2024    CR 0.88 09/24/2024     (H) 09/24/2024    SED 8 11/26/2023    AST 19 09/24/2024    ALT 13 09/24/2024    ALKPHOS 63 09/24/2024    BILITOTAL 0.3 09/24/2024      Assessment/plan:   Jenni Torres is a 53 year old female with a right pT1cN0 hormone receptor positive, HER2 positive breast cancer s/p lumpectomy and currently on week 10 adjuvant Taxol/Herceptin here for transfer of care.    1.  Breast Cancer:  Proceed with week 10 Taxol/Herceptin later this week. Labs reviewed. Reviewed plans for adjuvant Herceptin q3 weeks to finish a year of therapy. She's wishes to keep infusions on Thursdays. Echo due October 2024 and ordered today.  Has appt for Rad Onc for adjuvant radiation. We  discussed the rationale of anti-hormonal therapy to reduce the risk of cancer recurrence.  Tamoxifen are approved for use in premenopausal women for 5-10 years. Side effects include menopausal symptoms such as hot flashes, mood irritability, insomnia and vaginal dryness, menstrual irregularity, birth defects, cataracts, uterine cancer as well as thromboembolic disease.   She can start one month after last Taxol but can wait until after radiation is completed if she wishes. Discussed plans for monitoring. She is due for a breast MRI (6 month followup) in December, will need to be timed with radiation.  Will plan for annual mammogram. We discussed implications of genetics testing on monitoring.    All of the patient's questions were answered.    Return to the office in 3 weeks with CRISTINA and 7 weeks with MD (pre Herceptin #3) or sooner as needed    Thank you for allowing me to participate in the care of your patient.  Please call with any questions.    I personally reviewed the recent studies and I explained the rationale of the tests ordered today to the patient.     Orders Placed This Encounter   Procedures     Echocardiogram Complete          Again, thank you for allowing me to participate in the care of your patient.        Sincerely,        Christiana Tomas MD

## 2024-09-24 NOTE — PROGRESS NOTES
Oncology consultation    Name: Jenni Torres  : 1970  MRN: [id number]  Date of Service: 2024    CC: Breast Cancer    HPI: Jenni Torres is a 53 year old female with a right pT1cN0 hormone receptor positive, HER2 positive breast cancer s/p lumpectomy and currently on week 10 adjuvant Taxol/Herceptin here for transfer of care.    She was previously cared for by Dr. Mariee.  Her cancer history is below and reviewed. Prior medical oncology notes reviewed.     No neuropathy. Some fatigue. Some abdominal cramping and diarrhea. Improved with atropine. Some vaginal dryness as well, plans to try an OTC.  LMP in 2024, no hot flashes. Seeing Radiation Oncology 10/31 and Genetics .  Concerns about echo coverage    History  24: right lumpectomy 1.3cm positive DCIS margins. 0/3 LN   24 re-excision: negative margins  24: week 1 Taxol/Herceptin  24: JORGITO  24: week 10 Taxol/Herceptin    Review of systems: All other systems reviewed and are negative except for what is described in the history of present illness.      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  Last seizure  before starting medicine      Acquired hypothyroidism 2006     Priority: Medium    Allergic rhinitis 2006     Priority: Medium    Pain in joint 2006     Priority: Medium    Premenstrual  tension syndrome 04/04/2006     Priority: Medium     Past Medical History:   Diagnosis Date    Hypothyroidism     Kidney stone 11/2018    Seizures (H)        Medications:   Current Outpatient Medications:     dexAMETHasone (DECADRON) 4 MG tablet, Take 2 tablets (8 mg) by mouth daily Take 2 tablets (8mg) the evening before chemotherapy infusion and 2 tablets (8mg) the morning of chemotherapy infusion, for first two chemotherapy infusions. Take with food., Disp: 8 tablet, Rfl: 0    levETIRAcetam (KEPPRA) 500 MG tablet, Take 1 tablet (500 mg) by mouth daily, Disp: 90 tablet, Rfl: 3    levothyroxine (SYNTHROID/LEVOTHROID) 137 MCG tablet, Take 1 tablet (137 mcg) by mouth daily, Disp: 90 tablet, Rfl: 1    lidocaine-prilocaine (EMLA) 2.5-2.5 % external cream, Apply topically as needed for other (apply to port site 30 minutes prior to arrival at infusion center), Disp: 30 g, Rfl: 1    Multiple Vitamin (ONE-A-DAY ESSENTIAL) TABS, Take 1 tablet by mouth daily, Disp: , Rfl:     prochlorperazine (COMPAZINE) 10 MG tablet, Take 1 tablet (10 mg) by mouth every 6 hours as needed for nausea or vomiting, Disp: 30 tablet, Rfl: 2    sertraline (ZOLOFT) 50 MG tablet, Take 1 tablet by mouth daily., Disp: 90 tablet, Rfl: 3    valACYclovir (VALTREX) 1000 mg tablet, Take 2 tablets (2,000 mg) by mouth 2 times daily, Disp: 4 tablet, Rfl: 3  No current facility-administered medications for this visit.    Facility-Administered Medications Ordered in Other Visits:     heparin lock flush 100 unit/mL injection 5 mL, 5 mL, Intracatheter, Once PRN, Christiana Tomas MD    sodium chloride (PF) 0.9% PF flush 3-20 mL, 3-20 mL, Intracatheter, q1 min prn, Christiana Tomas MD    Allergies: No Known Allergies    Family history:  Family History   Problem Relation Age of Onset    Hypertension Mother     Hyperlipidemia Mother     Obesity Mother     Hypertension Father     Hyperlipidemia Father     Asthma Maternal Grandmother     Obesity Maternal  Grandmother     Prostate Cancer Paternal Grandfather     Breast Cancer Paternal Grandmother     Thyroid Disease Paternal Grandmother        Physical exam:  Vitals:/72   Pulse 72   Temp 98.4  F (36.9  C) (Temporal)   Resp 18   Wt 66.2 kg (146 lb)   SpO2 97%   BMI 22.94 kg/m    GENERAL: No acute distress, pleasant, PS 1  EYES: Sclera anicteric  NECK: Supple  LYMPH NODES: No cervical, supraclavicular lymphadenopathy  CARDIOVASCULAR: Regular rate and rhythm  LUNGS: Clear to auscultation bilaterally  GASTROINTESTINAL: Soft, nontender, nondistended.  No palpable hepatosplenomegaly  EXTREMITIES: No edema  SKIN: No rashes or petechiae, port clear  BACK: No midline tenderness  NEURO: Normal gait    Labs:  Lab Results   Component Value Date    WBC 5.5 09/24/2024    HGB 11.7 09/24/2024    HCT 35.8 09/24/2024     09/24/2024     09/24/2024    POTASSIUM 4.1 09/24/2024    CHLORIDE 104 09/24/2024    CO2 25 09/24/2024    BUN 12.6 09/24/2024    CR 0.88 09/24/2024     (H) 09/24/2024    SED 8 11/26/2023    AST 19 09/24/2024    ALT 13 09/24/2024    ALKPHOS 63 09/24/2024    BILITOTAL 0.3 09/24/2024      Assessment/plan:   Jenni Torres is a 53 year old female with a right pT1cN0 hormone receptor positive, HER2 positive breast cancer s/p lumpectomy and currently on week 10 adjuvant Taxol/Herceptin here for transfer of care.    1.  Breast Cancer:  Proceed with week 10 Taxol/Herceptin later this week. Labs reviewed. Reviewed plans for adjuvant Herceptin q3 weeks to finish a year of therapy. She's wishes to keep infusions on Thursdays. Echo due October 2024 and ordered today.  Has appt for Rad Onc for adjuvant radiation. We discussed the rationale of anti-hormonal therapy to reduce the risk of cancer recurrence.  Tamoxifen are approved for use in premenopausal women for 5-10 years. Side effects include menopausal symptoms such as hot flashes, mood irritability, insomnia and vaginal dryness, menstrual  irregularity, birth defects, cataracts, uterine cancer as well as thromboembolic disease.   She can start one month after last Taxol but can wait until after radiation is completed if she wishes. Discussed plans for monitoring. She is due for a breast MRI (6 month followup) in December, will need to be timed with radiation.  Will plan for annual mammogram. We discussed implications of genetics testing on monitoring.    All of the patient's questions were answered.    Return to the office in 3 weeks with CRISTINA and 7 weeks with MD (pre Herceptin #3) or sooner as needed    Thank you for allowing me to participate in the care of your patient.  Please call with any questions.    I personally reviewed the recent studies and I explained the rationale of the tests ordered today to the patient.     Orders Placed This Encounter   Procedures    Echocardiogram Complete

## 2024-09-26 ENCOUNTER — INFUSION THERAPY VISIT (OUTPATIENT)
Dept: INFUSION THERAPY | Facility: CLINIC | Age: 54
End: 2024-09-26
Attending: INTERNAL MEDICINE
Payer: COMMERCIAL

## 2024-09-26 VITALS
RESPIRATION RATE: 18 BRPM | WEIGHT: 145 LBS | BODY MASS INDEX: 22.78 KG/M2 | SYSTOLIC BLOOD PRESSURE: 107 MMHG | OXYGEN SATURATION: 98 % | HEART RATE: 70 BPM | TEMPERATURE: 98.2 F | DIASTOLIC BLOOD PRESSURE: 70 MMHG

## 2024-09-26 DIAGNOSIS — Z17.0 MALIGNANT NEOPLASM OF UPPER-OUTER QUADRANT OF RIGHT BREAST IN FEMALE, ESTROGEN RECEPTOR POSITIVE (H): Primary | ICD-10-CM

## 2024-09-26 DIAGNOSIS — C50.411 MALIGNANT NEOPLASM OF UPPER-OUTER QUADRANT OF RIGHT BREAST IN FEMALE, ESTROGEN RECEPTOR POSITIVE (H): Primary | ICD-10-CM

## 2024-09-26 PROCEDURE — 96375 TX/PRO/DX INJ NEW DRUG ADDON: CPT

## 2024-09-26 PROCEDURE — 258N000003 HC RX IP 258 OP 636: Performed by: INTERNAL MEDICINE

## 2024-09-26 PROCEDURE — 96413 CHEMO IV INFUSION 1 HR: CPT

## 2024-09-26 PROCEDURE — 250N000011 HC RX IP 250 OP 636: Performed by: PHYSICIAN ASSISTANT

## 2024-09-26 PROCEDURE — 250N000011 HC RX IP 250 OP 636: Performed by: INTERNAL MEDICINE

## 2024-09-26 PROCEDURE — 96417 CHEMO IV INFUS EACH ADDL SEQ: CPT

## 2024-09-26 RX ORDER — ATROPINE SULFATE 0.4 MG/ML
0.4 AMPUL (ML) INJECTION EVERY 10 MIN PRN
Status: DISCONTINUED | OUTPATIENT
Start: 2024-09-26 | End: 2024-09-26 | Stop reason: HOSPADM

## 2024-09-26 RX ORDER — ONDANSETRON 2 MG/ML
8 INJECTION INTRAMUSCULAR; INTRAVENOUS ONCE
Status: COMPLETED | OUTPATIENT
Start: 2024-09-26 | End: 2024-09-26

## 2024-09-26 RX ORDER — HEPARIN SODIUM (PORCINE) LOCK FLUSH IV SOLN 100 UNIT/ML 100 UNIT/ML
5 SOLUTION INTRAVENOUS EVERY 8 HOURS
Status: DISCONTINUED | OUTPATIENT
Start: 2024-09-26 | End: 2024-09-26 | Stop reason: HOSPADM

## 2024-09-26 RX ADMIN — ONDANSETRON 8 MG: 2 INJECTION INTRAMUSCULAR; INTRAVENOUS at 12:15

## 2024-09-26 RX ADMIN — SODIUM CHLORIDE 130 MG: 9 INJECTION, SOLUTION INTRAVENOUS at 13:52

## 2024-09-26 RX ADMIN — PACLITAXEL 138 MG: 6 INJECTION, SOLUTION INTRAVENOUS at 12:25

## 2024-09-26 RX ADMIN — Medication 5 ML: at 12:21

## 2024-09-26 RX ADMIN — SODIUM CHLORIDE 250 ML: 9 INJECTION, SOLUTION INTRAVENOUS at 12:16

## 2024-09-26 RX ADMIN — ATROPINE SULFATE 0.4 MG: 0.4 INJECTION, SOLUTION INTRAVENOUS at 12:35

## 2024-09-26 NOTE — CONFIDENTIAL NOTE
Infusion Nursing Note:  Jenni Torres presents today for D1C10 Taxol and Trazimera.    Patient seen by provider today: No, saw Dr. Quinn   present during visit today: Not Applicable.    Note: PRN atropine given as patient has been struggling with stomach cramps.  Patient ices during Taxol.      Intravenous Access:  Implanted Port.  IV 3000.    Treatment Conditions:  Lab Results   Component Value Date    HGB 11.7 09/24/2024    WBC 5.5 09/24/2024    ANEU 5.3 11/22/2018    ANEUTAUTO 3.9 09/24/2024     09/24/2024        Lab Results   Component Value Date     09/24/2024    POTASSIUM 4.1 09/24/2024    CR 0.88 09/24/2024    LEDA 9.2 09/24/2024    BILITOTAL 0.3 09/24/2024    ALBUMIN 4.6 09/24/2024    ALT 13 09/24/2024    AST 19 09/24/2024       Results reviewed, labs MET treatment parameters, ok to proceed with treatment.      Post Infusion Assessment:  Patient tolerated infusion without incident.  Blood return noted pre and post infusion.  Site patent and intact, free from redness, edema or discomfort.  No evidence of extravasations.  Access discontinued per protocol.       Discharge Plan:   AVS to patient via MYCHART.  Patient will return 10/4 for next appointment.   Patient discharged in stable condition accompanied by: self.  Departure Mode: Ambulatory.      Boni Joyce RN

## 2024-10-04 ENCOUNTER — INFUSION THERAPY VISIT (OUTPATIENT)
Dept: INFUSION THERAPY | Facility: CLINIC | Age: 54
End: 2024-10-04
Attending: PHYSICIAN ASSISTANT
Payer: COMMERCIAL

## 2024-10-04 VITALS
BODY MASS INDEX: 22.97 KG/M2 | RESPIRATION RATE: 16 BRPM | WEIGHT: 146.2 LBS | DIASTOLIC BLOOD PRESSURE: 65 MMHG | OXYGEN SATURATION: 100 % | SYSTOLIC BLOOD PRESSURE: 110 MMHG | HEART RATE: 64 BPM | TEMPERATURE: 97.8 F

## 2024-10-04 DIAGNOSIS — Z17.0 MALIGNANT NEOPLASM OF UPPER-OUTER QUADRANT OF RIGHT BREAST IN FEMALE, ESTROGEN RECEPTOR POSITIVE (H): Primary | ICD-10-CM

## 2024-10-04 DIAGNOSIS — C50.411 MALIGNANT NEOPLASM OF UPPER-OUTER QUADRANT OF RIGHT BREAST IN FEMALE, ESTROGEN RECEPTOR POSITIVE (H): Primary | ICD-10-CM

## 2024-10-04 LAB
BASOPHILS # BLD AUTO: 0.1 10E3/UL (ref 0–0.2)
BASOPHILS NFR BLD AUTO: 1 %
EOSINOPHIL # BLD AUTO: 0.3 10E3/UL (ref 0–0.7)
EOSINOPHIL NFR BLD AUTO: 5 %
ERYTHROCYTE [DISTWIDTH] IN BLOOD BY AUTOMATED COUNT: 13.5 % (ref 10–15)
HCT VFR BLD AUTO: 35.9 % (ref 35–47)
HGB BLD-MCNC: 11.7 G/DL (ref 11.7–15.7)
IMM GRANULOCYTES # BLD: 0 10E3/UL
IMM GRANULOCYTES NFR BLD: 1 %
LYMPHOCYTES # BLD AUTO: 1.3 10E3/UL (ref 0.8–5.3)
LYMPHOCYTES NFR BLD AUTO: 24 %
MCH RBC QN AUTO: 30.6 PG (ref 26.5–33)
MCHC RBC AUTO-ENTMCNC: 32.6 G/DL (ref 31.5–36.5)
MCV RBC AUTO: 94 FL (ref 78–100)
MONOCYTES # BLD AUTO: 0.4 10E3/UL (ref 0–1.3)
MONOCYTES NFR BLD AUTO: 7 %
NEUTROPHILS # BLD AUTO: 3.3 10E3/UL (ref 1.6–8.3)
NEUTROPHILS NFR BLD AUTO: 62 %
NRBC # BLD AUTO: 0 10E3/UL
NRBC BLD AUTO-RTO: 0 /100
PLATELET # BLD AUTO: 252 10E3/UL (ref 150–450)
RBC # BLD AUTO: 3.82 10E6/UL (ref 3.8–5.2)
WBC # BLD AUTO: 5.3 10E3/UL (ref 4–11)

## 2024-10-04 PROCEDURE — 36591 DRAW BLOOD OFF VENOUS DEVICE: CPT | Performed by: INTERNAL MEDICINE

## 2024-10-04 PROCEDURE — 250N000011 HC RX IP 250 OP 636: Performed by: INTERNAL MEDICINE

## 2024-10-04 PROCEDURE — 258N000003 HC RX IP 258 OP 636: Performed by: INTERNAL MEDICINE

## 2024-10-04 PROCEDURE — 96375 TX/PRO/DX INJ NEW DRUG ADDON: CPT

## 2024-10-04 PROCEDURE — 85004 AUTOMATED DIFF WBC COUNT: CPT | Performed by: INTERNAL MEDICINE

## 2024-10-04 PROCEDURE — 96413 CHEMO IV INFUSION 1 HR: CPT

## 2024-10-04 PROCEDURE — 96417 CHEMO IV INFUS EACH ADDL SEQ: CPT

## 2024-10-04 RX ORDER — HEPARIN SODIUM (PORCINE) LOCK FLUSH IV SOLN 100 UNIT/ML 100 UNIT/ML
5 SOLUTION INTRAVENOUS
Status: DISCONTINUED | OUTPATIENT
Start: 2024-10-04 | End: 2024-10-04 | Stop reason: HOSPADM

## 2024-10-04 RX ORDER — ATROPINE SULFATE 0.4 MG/ML
0.4 AMPUL (ML) INJECTION EVERY 10 MIN PRN
Status: DISCONTINUED | OUTPATIENT
Start: 2024-10-04 | End: 2024-10-04 | Stop reason: HOSPADM

## 2024-10-04 RX ORDER — ONDANSETRON 2 MG/ML
8 INJECTION INTRAMUSCULAR; INTRAVENOUS ONCE
Status: COMPLETED | OUTPATIENT
Start: 2024-10-04 | End: 2024-10-04

## 2024-10-04 RX ADMIN — Medication 5 ML: at 11:15

## 2024-10-04 RX ADMIN — ATROPINE SULFATE 0.4 MG: 0.4 INJECTION, SOLUTION INTRAVENOUS at 10:05

## 2024-10-04 RX ADMIN — PACLITAXEL 138 MG: 6 INJECTION, SOLUTION INTRAVENOUS at 10:08

## 2024-10-04 RX ADMIN — SODIUM CHLORIDE 130 MG: 9 INJECTION, SOLUTION INTRAVENOUS at 09:30

## 2024-10-04 RX ADMIN — ONDANSETRON 8 MG: 2 INJECTION INTRAMUSCULAR; INTRAVENOUS at 09:24

## 2024-10-04 NOTE — PROGRESS NOTES
Infusion Nursing Note:  Jenni Torres presents today for Labs + C11D1 Paclitaxel/Trastuzumab.    Patient seen by provider today: No   present during visit today: Not Applicable.    Note: Jenni reports she is feeling well today.  She denies any new or concerning symptoms.    Atropine administered prior to Paclitaxel to prevent abdominal cramping.  Patient reports minimal cramping during past week.  She ices her hands/feet during Paclitaxel administration.    Intravenous Access:  Labs drawn without difficulty.  Implanted Port.  IV-3000 dressing used.    Treatment Conditions:  Lab Results   Component Value Date    HGB 11.7 10/04/2024    WBC 5.3 10/04/2024    ANEU 5.3 11/22/2018    ANEUTAUTO 3.3 10/04/2024     10/04/2024     Results reviewed, labs MET treatment parameters, ok to proceed with treatment.    Post Infusion Assessment:  Patient tolerated infusion without incident.  Blood return noted pre and post infusion.  Site patent and intact, free from redness, edema or discomfort.  No evidence of extravasations.  Access discontinued per protocol.     Discharge Plan:   Discharge instructions reviewed with: Patient.  Patient and/or family verbalized understanding of discharge instructions and all questions answered.  AVS to patient via [employer].  Patient will return 10/11 for next appointment.   Patient discharged in stable condition accompanied by: self.  Departure Mode: Ambulatory.      Rico Daily RN

## 2024-10-07 DIAGNOSIS — Z17.0 MALIGNANT NEOPLASM OF UPPER-OUTER QUADRANT OF RIGHT BREAST IN FEMALE, ESTROGEN RECEPTOR POSITIVE (H): Primary | ICD-10-CM

## 2024-10-07 DIAGNOSIS — C50.411 MALIGNANT NEOPLASM OF UPPER-OUTER QUADRANT OF RIGHT BREAST IN FEMALE, ESTROGEN RECEPTOR POSITIVE (H): Primary | ICD-10-CM

## 2024-10-07 RX ORDER — DIPHENHYDRAMINE HYDROCHLORIDE 50 MG/ML
50 INJECTION INTRAMUSCULAR; INTRAVENOUS
Status: CANCELLED
Start: 2024-10-18

## 2024-10-07 RX ORDER — DIPHENHYDRAMINE HCL 25 MG
50 CAPSULE ORAL
Status: CANCELLED
Start: 2024-10-18

## 2024-10-07 RX ORDER — EPINEPHRINE 1 MG/ML
0.3 INJECTION, SOLUTION INTRAMUSCULAR; SUBCUTANEOUS EVERY 5 MIN PRN
Status: CANCELLED | OUTPATIENT
Start: 2024-10-18

## 2024-10-07 RX ORDER — DIPHENHYDRAMINE HYDROCHLORIDE 50 MG/ML
50 INJECTION INTRAMUSCULAR; INTRAVENOUS
Status: CANCELLED
Start: 2024-11-29

## 2024-10-07 RX ORDER — ALBUTEROL SULFATE 90 UG/1
1-2 INHALANT RESPIRATORY (INHALATION)
Status: CANCELLED
Start: 2024-12-20

## 2024-10-07 RX ORDER — ONDANSETRON 2 MG/ML
8 INJECTION INTRAMUSCULAR; INTRAVENOUS ONCE
Status: CANCELLED | OUTPATIENT
Start: 2024-11-08

## 2024-10-07 RX ORDER — EPINEPHRINE 1 MG/ML
0.3 INJECTION, SOLUTION INTRAMUSCULAR; SUBCUTANEOUS EVERY 5 MIN PRN
Status: CANCELLED | OUTPATIENT
Start: 2024-12-20

## 2024-10-07 RX ORDER — LORAZEPAM 2 MG/ML
0.5 INJECTION INTRAMUSCULAR EVERY 4 HOURS PRN
OUTPATIENT
Start: 2024-11-29

## 2024-10-07 RX ORDER — ONDANSETRON 2 MG/ML
8 INJECTION INTRAMUSCULAR; INTRAVENOUS ONCE
OUTPATIENT
Start: 2024-12-20

## 2024-10-07 RX ORDER — HEPARIN SODIUM,PORCINE 10 UNIT/ML
5-20 VIAL (ML) INTRAVENOUS DAILY PRN
OUTPATIENT
Start: 2024-11-29

## 2024-10-07 RX ORDER — DIPHENHYDRAMINE HCL 25 MG
50 CAPSULE ORAL
Start: 2024-11-29

## 2024-10-07 RX ORDER — HEPARIN SODIUM (PORCINE) LOCK FLUSH IV SOLN 100 UNIT/ML 100 UNIT/ML
5 SOLUTION INTRAVENOUS
OUTPATIENT
Start: 2024-12-20

## 2024-10-07 RX ORDER — ACETAMINOPHEN 325 MG/1
650 TABLET ORAL
Status: CANCELLED | OUTPATIENT
Start: 2024-10-18

## 2024-10-07 RX ORDER — EPINEPHRINE 1 MG/ML
0.3 INJECTION, SOLUTION INTRAMUSCULAR; SUBCUTANEOUS EVERY 5 MIN PRN
Status: CANCELLED | OUTPATIENT
Start: 2024-11-29

## 2024-10-07 RX ORDER — ACETAMINOPHEN 325 MG/1
650 TABLET ORAL
OUTPATIENT
Start: 2024-11-29

## 2024-10-07 RX ORDER — ONDANSETRON 2 MG/ML
8 INJECTION INTRAMUSCULAR; INTRAVENOUS ONCE
OUTPATIENT
Start: 2024-11-29

## 2024-10-07 RX ORDER — HEPARIN SODIUM,PORCINE 10 UNIT/ML
5-20 VIAL (ML) INTRAVENOUS DAILY PRN
Status: CANCELLED | OUTPATIENT
Start: 2024-11-08

## 2024-10-07 RX ORDER — ALBUTEROL SULFATE 0.83 MG/ML
2.5 SOLUTION RESPIRATORY (INHALATION)
Status: CANCELLED | OUTPATIENT
Start: 2024-12-20

## 2024-10-07 RX ORDER — LORAZEPAM 2 MG/ML
0.5 INJECTION INTRAMUSCULAR EVERY 4 HOURS PRN
Status: CANCELLED | OUTPATIENT
Start: 2024-10-18

## 2024-10-07 RX ORDER — METHYLPREDNISOLONE SODIUM SUCCINATE 125 MG/2ML
125 INJECTION INTRAMUSCULAR; INTRAVENOUS
Status: CANCELLED
Start: 2024-11-08

## 2024-10-07 RX ORDER — MEPERIDINE HYDROCHLORIDE 25 MG/ML
25 INJECTION INTRAMUSCULAR; INTRAVENOUS; SUBCUTANEOUS EVERY 30 MIN PRN
Status: CANCELLED | OUTPATIENT
Start: 2024-11-29

## 2024-10-07 RX ORDER — ACETAMINOPHEN 325 MG/1
650 TABLET ORAL
Status: CANCELLED | OUTPATIENT
Start: 2024-11-08

## 2024-10-07 RX ORDER — METHYLPREDNISOLONE SODIUM SUCCINATE 125 MG/2ML
125 INJECTION INTRAMUSCULAR; INTRAVENOUS
Status: CANCELLED
Start: 2024-10-18

## 2024-10-07 RX ORDER — DIPHENHYDRAMINE HYDROCHLORIDE 50 MG/ML
50 INJECTION INTRAMUSCULAR; INTRAVENOUS
Status: CANCELLED
Start: 2024-12-20

## 2024-10-07 RX ORDER — ACETAMINOPHEN 325 MG/1
650 TABLET ORAL
OUTPATIENT
Start: 2024-12-20

## 2024-10-07 RX ORDER — HEPARIN SODIUM (PORCINE) LOCK FLUSH IV SOLN 100 UNIT/ML 100 UNIT/ML
5 SOLUTION INTRAVENOUS
Status: CANCELLED | OUTPATIENT
Start: 2024-11-08

## 2024-10-07 RX ORDER — HEPARIN SODIUM (PORCINE) LOCK FLUSH IV SOLN 100 UNIT/ML 100 UNIT/ML
5 SOLUTION INTRAVENOUS
Status: CANCELLED | OUTPATIENT
Start: 2024-10-18

## 2024-10-07 RX ORDER — ALBUTEROL SULFATE 0.83 MG/ML
2.5 SOLUTION RESPIRATORY (INHALATION)
Status: CANCELLED | OUTPATIENT
Start: 2024-10-18

## 2024-10-07 RX ORDER — ALBUTEROL SULFATE 0.83 MG/ML
2.5 SOLUTION RESPIRATORY (INHALATION)
Status: CANCELLED | OUTPATIENT
Start: 2024-11-29

## 2024-10-07 RX ORDER — METHYLPREDNISOLONE SODIUM SUCCINATE 125 MG/2ML
125 INJECTION INTRAMUSCULAR; INTRAVENOUS
Status: CANCELLED
Start: 2024-12-20

## 2024-10-07 RX ORDER — LORAZEPAM 2 MG/ML
0.5 INJECTION INTRAMUSCULAR EVERY 4 HOURS PRN
Status: CANCELLED | OUTPATIENT
Start: 2024-11-08

## 2024-10-07 RX ORDER — LORAZEPAM 2 MG/ML
0.5 INJECTION INTRAMUSCULAR EVERY 4 HOURS PRN
OUTPATIENT
Start: 2024-12-20

## 2024-10-07 RX ORDER — MEPERIDINE HYDROCHLORIDE 25 MG/ML
25 INJECTION INTRAMUSCULAR; INTRAVENOUS; SUBCUTANEOUS EVERY 30 MIN PRN
Status: CANCELLED | OUTPATIENT
Start: 2024-12-20

## 2024-10-07 RX ORDER — ALBUTEROL SULFATE 90 UG/1
1-2 INHALANT RESPIRATORY (INHALATION)
Status: CANCELLED
Start: 2024-11-08

## 2024-10-07 RX ORDER — MEPERIDINE HYDROCHLORIDE 25 MG/ML
25 INJECTION INTRAMUSCULAR; INTRAVENOUS; SUBCUTANEOUS EVERY 30 MIN PRN
Status: CANCELLED | OUTPATIENT
Start: 2024-10-18

## 2024-10-07 RX ORDER — ALBUTEROL SULFATE 90 UG/1
1-2 INHALANT RESPIRATORY (INHALATION)
Status: CANCELLED
Start: 2024-10-18

## 2024-10-07 RX ORDER — EPINEPHRINE 1 MG/ML
0.3 INJECTION, SOLUTION INTRAMUSCULAR; SUBCUTANEOUS EVERY 5 MIN PRN
Status: CANCELLED | OUTPATIENT
Start: 2024-11-08

## 2024-10-07 RX ORDER — HEPARIN SODIUM,PORCINE 10 UNIT/ML
5-20 VIAL (ML) INTRAVENOUS DAILY PRN
Status: CANCELLED | OUTPATIENT
Start: 2024-10-18

## 2024-10-07 RX ORDER — DIPHENHYDRAMINE HYDROCHLORIDE 50 MG/ML
50 INJECTION INTRAMUSCULAR; INTRAVENOUS
Status: CANCELLED
Start: 2024-11-08

## 2024-10-07 RX ORDER — METHYLPREDNISOLONE SODIUM SUCCINATE 125 MG/2ML
125 INJECTION INTRAMUSCULAR; INTRAVENOUS
Status: CANCELLED
Start: 2024-11-29

## 2024-10-07 RX ORDER — HEPARIN SODIUM,PORCINE 10 UNIT/ML
5-20 VIAL (ML) INTRAVENOUS DAILY PRN
OUTPATIENT
Start: 2024-12-20

## 2024-10-07 RX ORDER — ONDANSETRON 2 MG/ML
8 INJECTION INTRAMUSCULAR; INTRAVENOUS ONCE
Status: CANCELLED | OUTPATIENT
Start: 2024-10-17

## 2024-10-07 RX ORDER — MEPERIDINE HYDROCHLORIDE 25 MG/ML
25 INJECTION INTRAMUSCULAR; INTRAVENOUS; SUBCUTANEOUS EVERY 30 MIN PRN
Status: CANCELLED | OUTPATIENT
Start: 2024-11-08

## 2024-10-07 RX ORDER — HEPARIN SODIUM (PORCINE) LOCK FLUSH IV SOLN 100 UNIT/ML 100 UNIT/ML
5 SOLUTION INTRAVENOUS
OUTPATIENT
Start: 2024-11-29

## 2024-10-07 RX ORDER — ALBUTEROL SULFATE 0.83 MG/ML
2.5 SOLUTION RESPIRATORY (INHALATION)
Status: CANCELLED | OUTPATIENT
Start: 2024-11-08

## 2024-10-07 RX ORDER — ALBUTEROL SULFATE 90 UG/1
1-2 INHALANT RESPIRATORY (INHALATION)
Status: CANCELLED
Start: 2024-11-29

## 2024-10-07 RX ORDER — DIPHENHYDRAMINE HCL 25 MG
50 CAPSULE ORAL
Start: 2024-12-20

## 2024-10-07 RX ORDER — DIPHENHYDRAMINE HCL 25 MG
50 CAPSULE ORAL
Status: CANCELLED
Start: 2024-11-08

## 2024-10-11 ENCOUNTER — INFUSION THERAPY VISIT (OUTPATIENT)
Dept: INFUSION THERAPY | Facility: CLINIC | Age: 54
End: 2024-10-11
Attending: PHYSICIAN ASSISTANT
Payer: COMMERCIAL

## 2024-10-11 ENCOUNTER — ONCOLOGY VISIT (OUTPATIENT)
Dept: ONCOLOGY | Facility: CLINIC | Age: 54
End: 2024-10-11
Attending: PHYSICIAN ASSISTANT
Payer: COMMERCIAL

## 2024-10-11 VITALS
BODY MASS INDEX: 22.52 KG/M2 | HEIGHT: 67 IN | DIASTOLIC BLOOD PRESSURE: 68 MMHG | RESPIRATION RATE: 16 BRPM | HEART RATE: 77 BPM | SYSTOLIC BLOOD PRESSURE: 104 MMHG | WEIGHT: 143.5 LBS | OXYGEN SATURATION: 99 % | TEMPERATURE: 97.4 F

## 2024-10-11 DIAGNOSIS — Z17.0 MALIGNANT NEOPLASM OF UPPER-OUTER QUADRANT OF RIGHT BREAST IN FEMALE, ESTROGEN RECEPTOR POSITIVE (H): Primary | ICD-10-CM

## 2024-10-11 DIAGNOSIS — C50.411 MALIGNANT NEOPLASM OF UPPER-OUTER QUADRANT OF RIGHT BREAST IN FEMALE, ESTROGEN RECEPTOR POSITIVE (H): Primary | ICD-10-CM

## 2024-10-11 LAB
BASOPHILS # BLD AUTO: 0.1 10E3/UL (ref 0–0.2)
BASOPHILS NFR BLD AUTO: 2 %
EOSINOPHIL # BLD AUTO: 0.3 10E3/UL (ref 0–0.7)
EOSINOPHIL NFR BLD AUTO: 5 %
ERYTHROCYTE [DISTWIDTH] IN BLOOD BY AUTOMATED COUNT: 13.2 % (ref 10–15)
HCT VFR BLD AUTO: 37 % (ref 35–47)
HGB BLD-MCNC: 11.9 G/DL (ref 11.7–15.7)
IMM GRANULOCYTES # BLD: 0 10E3/UL
IMM GRANULOCYTES NFR BLD: 1 %
LYMPHOCYTES # BLD AUTO: 1.4 10E3/UL (ref 0.8–5.3)
LYMPHOCYTES NFR BLD AUTO: 30 %
MCH RBC QN AUTO: 30.4 PG (ref 26.5–33)
MCHC RBC AUTO-ENTMCNC: 32.2 G/DL (ref 31.5–36.5)
MCV RBC AUTO: 94 FL (ref 78–100)
MONOCYTES # BLD AUTO: 0.4 10E3/UL (ref 0–1.3)
MONOCYTES NFR BLD AUTO: 7 %
NEUTROPHILS # BLD AUTO: 2.6 10E3/UL (ref 1.6–8.3)
NEUTROPHILS NFR BLD AUTO: 56 %
NRBC # BLD AUTO: 0 10E3/UL
NRBC BLD AUTO-RTO: 0 /100
PLATELET # BLD AUTO: 248 10E3/UL (ref 150–450)
RBC # BLD AUTO: 3.92 10E6/UL (ref 3.8–5.2)
WBC # BLD AUTO: 4.7 10E3/UL (ref 4–11)

## 2024-10-11 PROCEDURE — 250N000011 HC RX IP 250 OP 636: Performed by: INTERNAL MEDICINE

## 2024-10-11 PROCEDURE — 258N000003 HC RX IP 258 OP 636: Performed by: INTERNAL MEDICINE

## 2024-10-11 PROCEDURE — 96413 CHEMO IV INFUSION 1 HR: CPT

## 2024-10-11 PROCEDURE — 99213 OFFICE O/P EST LOW 20 MIN: CPT | Performed by: PHYSICIAN ASSISTANT

## 2024-10-11 PROCEDURE — 96417 CHEMO IV INFUS EACH ADDL SEQ: CPT

## 2024-10-11 PROCEDURE — 36415 COLL VENOUS BLD VENIPUNCTURE: CPT | Performed by: INTERNAL MEDICINE

## 2024-10-11 PROCEDURE — 96375 TX/PRO/DX INJ NEW DRUG ADDON: CPT

## 2024-10-11 PROCEDURE — 85025 COMPLETE CBC W/AUTO DIFF WBC: CPT | Performed by: INTERNAL MEDICINE

## 2024-10-11 RX ORDER — ATROPINE SULFATE 0.4 MG/ML
0.4 AMPUL (ML) INJECTION EVERY 10 MIN PRN
Status: DISCONTINUED | OUTPATIENT
Start: 2024-10-11 | End: 2024-10-11 | Stop reason: HOSPADM

## 2024-10-11 RX ORDER — HEPARIN SODIUM (PORCINE) LOCK FLUSH IV SOLN 100 UNIT/ML 100 UNIT/ML
5 SOLUTION INTRAVENOUS
Status: DISCONTINUED | OUTPATIENT
Start: 2024-10-11 | End: 2024-10-11 | Stop reason: HOSPADM

## 2024-10-11 RX ORDER — ONDANSETRON 2 MG/ML
8 INJECTION INTRAMUSCULAR; INTRAVENOUS ONCE
Status: COMPLETED | OUTPATIENT
Start: 2024-10-11 | End: 2024-10-11

## 2024-10-11 RX ADMIN — ONDANSETRON 8 MG: 2 INJECTION INTRAMUSCULAR; INTRAVENOUS at 09:17

## 2024-10-11 RX ADMIN — SODIUM CHLORIDE, PRESERVATIVE FREE 5 ML: 5 INJECTION INTRAVENOUS at 11:06

## 2024-10-11 RX ADMIN — SODIUM CHLORIDE 130 MG: 9 INJECTION, SOLUTION INTRAVENOUS at 09:27

## 2024-10-11 RX ADMIN — ATROPINE SULFATE 0.4 MG: 0.4 INJECTION, SOLUTION INTRAVENOUS at 09:58

## 2024-10-11 RX ADMIN — PACLITAXEL 138 MG: 6 INJECTION, SOLUTION INTRAVENOUS at 10:02

## 2024-10-11 ASSESSMENT — PAIN SCALES - GENERAL: PAINLEVEL: NO PAIN (0)

## 2024-10-11 NOTE — LETTER
10/11/2024      Jenni Torres  [address]  [place] [address]      Dear Colleague,    Thank you for referring your patient, Jenni Torres, to the [facility]. Please see a copy of my visit note below.    Oncology/Hematology Visit Note    Oct 11, 2024    Reason for visit: Follow up breast cancer    Oncology HPI: Jenni Torres is a 54 year old female with right-sided breast cancer, ER/MO positive, HER2 positive s/p right-sided lumpectomy with sentinel lymph node biopsy.  Pathology with invasive ductal carcinoma, grade 2, measured 1.3 cm in greatest dimension and associated DCIS.  All margins were negative on the invasive tumor, but she did have positive margins on the DCIS portion.  All 3 sentinel lymph nodes were negative.    Dr. Mariee recommended Taxol/Herceptin x 12 weeks, then followed by single agent Herceptin for total x 1 year.  Echo on 7/18/2024 with EF of 60-65%.  Taxol/Herceptin C1 D1 completed on 7/25/2024.    She is here today for weekly Taxol/Herceptin C12D1.    Interval History: Jenni is here today unaccompanied.  She is happy to complete Taxol after today, then she will continue with maintenance Herceptin every 3 weeks.  She has a big celebration with her last round of chemo with her family and she has a big day tomorrow with her family celebrating her son.  No other new complaints today.    Review of Systems: See interval hx. Denies fevers, chills, HA, dizziness, CP, SOB, abdominal pain, NV, changes in urination, bruising, rash.     PMHx and Social Hx reviewed per EPIC.      Medications:  Current Outpatient Medications   Medication Sig Dispense Refill     levETIRAcetam (KEPPRA) 500 MG tablet Take 1 tablet (500 mg) by mouth daily 90 tablet 3     levothyroxine (SYNTHROID/LEVOTHROID) 137 MCG tablet Take 1 tablet (137 mcg) by mouth daily 90 tablet 1     lidocaine-prilocaine (EMLA) 2.5-2.5 % external cream Apply topically as needed for other (apply to port  "site 30 minutes prior to arrival at infusion center) 30 g 1     Multiple Vitamin (ONE-A-DAY ESSENTIAL) TABS Take 1 tablet by mouth daily       prochlorperazine (COMPAZINE) 10 MG tablet Take 1 tablet (10 mg) by mouth every 6 hours as needed for nausea or vomiting 30 tablet 2     sertraline (ZOLOFT) 50 MG tablet Take 1 tablet by mouth daily. 90 tablet 3     valACYclovir (VALTREX) 1000 mg tablet Take 2 tablets (2,000 mg) by mouth 2 times daily 4 tablet 3     dexAMETHasone (DECADRON) 4 MG tablet Take 2 tablets (8 mg) by mouth daily Take 2 tablets (8mg) the evening before chemotherapy infusion and 2 tablets (8mg) the morning of chemotherapy infusion, for first two chemotherapy infusions. Take with food. (Patient not taking: Reported on 10/4/2024) 8 tablet 0       No Known Allergies      EXAM:    /68   Pulse 77   Temp 97.4  F (36.3  C) (Temporal)   Resp 16   Ht 1.699 m (5' 6.89\")   Wt 65.1 kg (143 lb 8 oz)   SpO2 99%   BMI 22.55 kg/m      GENERAL:  Female, in no acute distress.  Alert and oriented x3. Well groomed.   HEENT:  Normocephalic, atraumatic. No conjunctival injection or eye swelling.   LUNGS:  Nonlabored breathing, no cough or audible wheezing, able to speak full sentences.  MSK: Full ROM UE.    SKIN: No rash on exposed skin.   NEURO: CN grossly intact, speech normal  PSYCH: Mentation appears normal, insight and judgement intact      Labs:    10/11/24 08:11   WBC 4.7   Hemoglobin 11.9   Hematocrit 37.0   Platelet Count 248   RBC Count 3.92   MCV 94   MCH 30.4   MCHC 32.2   RDW 13.2   % Neutrophils 56   % Lymphocytes 30   % Monocytes 7   % Eosinophils 5   % Basophils 2   Absolute Basophils 0.1   Absolute Eosinophils 0.3   Absolute Immature Granulocytes 0.0   Absolute Lymphocytes 1.4   Absolute Monocytes 0.4   % Immature Granulocytes 1   Absolute Neutrophils 2.6   Absolute NRBCs 0.0   NRBCs per 100 WBC 0     Imaging: n/a    Impression/Plan: Jenni Torres is a 53 year old female with right-sided " ER/IL positive, HER2 positive breast cancer s/p lumpectomy currently on adjuvant Taxol/Herceptin.    Breast cancer: Invasive ductal carcinoma, grade 2, 1.3 cm with associated DCIS.  All margins were negative on the invasive tumor, but she did have positive margin on the DCIS.  All 3 sentinel lymph nodes negative.  She is also s/p right-sided lumpectomy and this is healing well.  She started adjuvant Taxol Herceptin with C1 D1 completed on 7/25/2024.  She Has been tolerating this pretty well.  Labs reviewed today and proceed with TH cycle 12 today.  She will move onto maintenance Herceptin every 3 weeks to complete a year.  She has established care with Dr. Tomas with Dr. Mariee's departure.    -10/24 echo  -10/31 radiation oncology  -11/5 genetics  -11/6 Herceptin  -11/26 Shanice Aleman    Abdominal cramping: Initially occurring after treatment, but last week, it occurred during infusion.  Atropine was added to the plan and improved.     Nausea: Secondary to chemotherapy, likely Taxol.  As needed.    Insomnia: Improved with melatonin.      Chart documentation with Dragon Voice recognition Software. Although reviewed after completion, some words and grammatical errors may remain.    20 minutes spent on the date of the encounter doing chart review, review of test results, interpretation of tests, patient visit, and documentation     Love Coates PA-C  Hematology/Oncology  [facility]                  Again, thank you for allowing me to participate in the care of your patient.        Sincerely,        Love Coates PA-C

## 2024-10-11 NOTE — PROGRESS NOTES
Oncology/Hematology Visit Note    Oct 11, 2024    Reason for visit: Follow up breast cancer    Oncology HPI: Jenni Torres is a 54 year old female with right-sided breast cancer, ER/KS positive, HER2 positive s/p right-sided lumpectomy with sentinel lymph node biopsy.  Pathology with invasive ductal carcinoma, grade 2, measured 1.3 cm in greatest dimension and associated DCIS.  All margins were negative on the invasive tumor, but she did have positive margins on the DCIS portion.  All 3 sentinel lymph nodes were negative.    Dr. Mariee recommended Taxol/Herceptin x 12 weeks, then followed by single agent Herceptin for total x 1 year.  Echo on 7/18/2024 with EF of 60-65%.  Taxol/Herceptin C1 D1 completed on 7/25/2024.    She is here today for weekly Taxol/Herceptin C12D1.    Interval History: Jenni is here today unaccompanied.  She is happy to complete Taxol after today, then she will continue with maintenance Herceptin every 3 weeks.  She has a big celebration with her last round of chemo with her family and she has a big day tomorrow with her family celebrating her son.  No other new complaints today.    Review of Systems: See interval hx. Denies fevers, chills, HA, dizziness, CP, SOB, abdominal pain, NV, changes in urination, bruising, rash.     PMHx and Social Hx reviewed per EPIC.      Medications:  Current Outpatient Medications   Medication Sig Dispense Refill    levETIRAcetam (KEPPRA) 500 MG tablet Take 1 tablet (500 mg) by mouth daily 90 tablet 3    levothyroxine (SYNTHROID/LEVOTHROID) 137 MCG tablet Take 1 tablet (137 mcg) by mouth daily 90 tablet 1    lidocaine-prilocaine (EMLA) 2.5-2.5 % external cream Apply topically as needed for other (apply to port site 30 minutes prior to arrival at infusion center) 30 g 1    Multiple Vitamin (ONE-A-DAY ESSENTIAL) TABS Take 1 tablet by mouth daily      prochlorperazine (COMPAZINE) 10 MG tablet Take 1 tablet (10 mg) by mouth every 6 hours as needed for nausea or  "vomiting 30 tablet 2    sertraline (ZOLOFT) 50 MG tablet Take 1 tablet by mouth daily. 90 tablet 3    valACYclovir (VALTREX) 1000 mg tablet Take 2 tablets (2,000 mg) by mouth 2 times daily 4 tablet 3    dexAMETHasone (DECADRON) 4 MG tablet Take 2 tablets (8 mg) by mouth daily Take 2 tablets (8mg) the evening before chemotherapy infusion and 2 tablets (8mg) the morning of chemotherapy infusion, for first two chemotherapy infusions. Take with food. (Patient not taking: Reported on 10/4/2024) 8 tablet 0       No Known Allergies      EXAM:    /68   Pulse 77   Temp 97.4  F (36.3  C) (Temporal)   Resp 16   Ht 1.699 m (5' 6.89\")   Wt 65.1 kg (143 lb 8 oz)   SpO2 99%   BMI 22.55 kg/m      GENERAL:  Female, in no acute distress.  Alert and oriented x3. Well groomed.   HEENT:  Normocephalic, atraumatic. No conjunctival injection or eye swelling.   LUNGS:  Nonlabored breathing, no cough or audible wheezing, able to speak full sentences.  MSK: Full ROM UE.    SKIN: No rash on exposed skin.   NEURO: CN grossly intact, speech normal  PSYCH: Mentation appears normal, insight and judgement intact      Labs:    10/11/24 08:11   WBC 4.7   Hemoglobin 11.9   Hematocrit 37.0   Platelet Count 248   RBC Count 3.92   MCV 94   MCH 30.4   MCHC 32.2   RDW 13.2   % Neutrophils 56   % Lymphocytes 30   % Monocytes 7   % Eosinophils 5   % Basophils 2   Absolute Basophils 0.1   Absolute Eosinophils 0.3   Absolute Immature Granulocytes 0.0   Absolute Lymphocytes 1.4   Absolute Monocytes 0.4   % Immature Granulocytes 1   Absolute Neutrophils 2.6   Absolute NRBCs 0.0   NRBCs per 100 WBC 0     Imaging: n/a    Impression/Plan: Jenni Torres is a 53 year old female with right-sided ER/DE positive, HER2 positive breast cancer s/p lumpectomy currently on adjuvant Taxol/Herceptin.    Breast cancer: Invasive ductal carcinoma, grade 2, 1.3 cm with associated DCIS.  All margins were negative on the invasive tumor, but she did have positive " margin on the DCIS.  All 3 sentinel lymph nodes negative.  She is also s/p right-sided lumpectomy and this is healing well.  She started adjuvant Taxol Herceptin with C1 D1 completed on 7/25/2024.  She Has been tolerating this pretty well.  Labs reviewed today and proceed with TH cycle 12 today.  She will move onto maintenance Herceptin every 3 weeks to complete a year.  She has established care with Dr. Tomas with Dr. Mariee's departure.    -10/24 echo  -10/31 radiation oncology  -11/5 genetics  -11/6 Herceptin  -11/26 Shanice Aleman    Abdominal cramping: Initially occurring after treatment, but last week, it occurred during infusion.  Atropine was added to the plan and improved.     Nausea: Secondary to chemotherapy, likely Taxol.  As needed.    Insomnia: Improved with melatonin.      Chart documentation with Dragon Voice recognition Software. Although reviewed after completion, some words and grammatical errors may remain.    20 minutes spent on the date of the encounter doing chart review, review of test results, interpretation of tests, patient visit, and documentation     Love Coates PA-C  Hematology/Oncology  [facility]

## 2024-10-11 NOTE — NURSING NOTE
"Oncology Rooming Note    October 11, 2024 8:28 AM   Jenni Torres is a 54 year old female who presents for:    Chief Complaint   Patient presents with    Oncology Clinic Visit     Malignant neoplasm of upper-outer quadrant of right breast in female, estrogen receptor positive      Initial Vitals: /68   Pulse 77   Temp 97.4  F (36.3  C) (Temporal)   Resp 16   Ht 1.699 m (5' 6.89\")   Wt 65.1 kg (143 lb 8 oz)   SpO2 99%   BMI 22.55 kg/m   Estimated body mass index is 22.55 kg/m  as calculated from the following:    Height as of this encounter: 1.699 m (5' 6.89\").    Weight as of this encounter: 65.1 kg (143 lb 8 oz). Body surface area is 1.75 meters squared.  No Pain (0) Comment: Data Unavailable   No LMP recorded.  Allergies reviewed: Yes  Medications reviewed: Yes    Medications: Medication refills not needed today.  Pharmacy name entered into EPIC: [facility] - [place], [place] - [address]    Frailty Screening:   Is the patient here for a new oncology consult visit in cancer care? 2. No      Clinical concerns: Follow up       Delaney Khan MA              "

## 2024-10-11 NOTE — PROGRESS NOTES
Nursing Note:  Jenni Torres presents today for Port Labs.    Patient seen by provider today: Yes: RED Rao   present during visit today: Not Applicable.    Note: N/A.    Intravenous Access:  Labs drawn without difficulty.  Implanted Port.    Discharge Plan:   Patient was sent to [facility] for PA appointment.    Palma Shepard RN

## 2024-10-11 NOTE — PROGRESS NOTES
Infusion Nursing Note:  Jenni Zavalakierra presents today for C12D1 Trazimera/Taxol.    Patient seen by provider today: Yes: RED Rao   present during visit today: Not Applicable.    Note: Last Taxol today. Continues to request Atropine prior to help with abd cramping and reports the icing has helped with neuropathy, as she currently has not experienced any throughout her 12 weeks of Taxol. Will continue with Trazimera every 3 weeks starting next week      Intravenous Access:  Implanted Port.    Treatment Conditions:  Lab Results   Component Value Date    HGB 11.9 10/11/2024    WBC 4.7 10/11/2024    ANEU 5.3 11/22/2018    ANEUTAUTO 2.6 10/11/2024     10/11/2024        Results reviewed, labs MET treatment parameters, ok to proceed with treatment.  ECHO/MUGA completed 7/18/245  EF 60-65%.      Post Infusion Assessment:  Patient tolerated infusion without incident.  Blood return noted pre and post infusion.  Site patent and intact, free from redness, edema or discomfort.  No evidence of extravasations.  Access discontinued per protocol.       Discharge Plan:   [facility]  Patient will return 10/17/24 for Trazimera   Patient discharged in stable condition accompanied by: mother.  Departure Mode: Ambulatory.      Merna Amos RN

## 2024-10-17 ENCOUNTER — INFUSION THERAPY VISIT (OUTPATIENT)
Dept: INFUSION THERAPY | Facility: CLINIC | Age: 54
End: 2024-10-17
Attending: INTERNAL MEDICINE
Payer: COMMERCIAL

## 2024-10-17 VITALS
TEMPERATURE: 98.5 F | HEART RATE: 61 BPM | DIASTOLIC BLOOD PRESSURE: 67 MMHG | BODY MASS INDEX: 23.07 KG/M2 | OXYGEN SATURATION: 98 % | WEIGHT: 146.8 LBS | RESPIRATION RATE: 16 BRPM | SYSTOLIC BLOOD PRESSURE: 100 MMHG

## 2024-10-17 DIAGNOSIS — C50.411 MALIGNANT NEOPLASM OF UPPER-OUTER QUADRANT OF RIGHT BREAST IN FEMALE, ESTROGEN RECEPTOR POSITIVE (H): Primary | ICD-10-CM

## 2024-10-17 DIAGNOSIS — Z17.0 MALIGNANT NEOPLASM OF UPPER-OUTER QUADRANT OF RIGHT BREAST IN FEMALE, ESTROGEN RECEPTOR POSITIVE (H): Primary | ICD-10-CM

## 2024-10-17 PROCEDURE — 96375 TX/PRO/DX INJ NEW DRUG ADDON: CPT

## 2024-10-17 PROCEDURE — 258N000003 HC RX IP 258 OP 636: Performed by: INTERNAL MEDICINE

## 2024-10-17 PROCEDURE — 250N000011 HC RX IP 250 OP 636: Performed by: INTERNAL MEDICINE

## 2024-10-17 PROCEDURE — 96413 CHEMO IV INFUSION 1 HR: CPT

## 2024-10-17 RX ORDER — ONDANSETRON 2 MG/ML
8 INJECTION INTRAMUSCULAR; INTRAVENOUS ONCE
Status: COMPLETED | OUTPATIENT
Start: 2024-10-17 | End: 2024-10-17

## 2024-10-17 RX ORDER — HEPARIN SODIUM (PORCINE) LOCK FLUSH IV SOLN 100 UNIT/ML 100 UNIT/ML
5 SOLUTION INTRAVENOUS
Status: DISCONTINUED | OUTPATIENT
Start: 2024-10-17 | End: 2024-10-17 | Stop reason: HOSPADM

## 2024-10-17 RX ADMIN — ONDANSETRON 8 MG: 2 INJECTION INTRAMUSCULAR; INTRAVENOUS at 14:22

## 2024-10-17 RX ADMIN — SODIUM CHLORIDE 380 MG: 9 INJECTION, SOLUTION INTRAVENOUS at 14:40

## 2024-10-17 RX ADMIN — HEPARIN 5 ML: 100 SYRINGE at 15:11

## 2024-10-17 NOTE — PROGRESS NOTES
Infusion Nursing Note:  Jenni Torres presents today for C13D1 Trazimera.    Patient seen by provider today: No   present during visit today: Not Applicable.    Note: N/A.      Intravenous Access:  Implanted Port.    Treatment Conditions:  ECHO/MUGA completed 7/18/24 EF 60-65%      Post Infusion Assessment:  Patient tolerated infusion without incident.  Blood return noted pre and post infusion.  Site patent and intact, free from redness, edema or discomfort.  No evidence of extravasations.  Access discontinued per protocol.       Discharge Plan:   AVS to patient via MYCHART.  Patient will return in 3 weeks for next appointment.   Patient discharged in stable condition accompanied by: self.  Departure Mode: Ambulatory.      Mandi Weeks RN

## 2024-10-24 ENCOUNTER — HOSPITAL ENCOUNTER (OUTPATIENT)
Dept: CARDIOLOGY | Facility: CLINIC | Age: 54
Discharge: HOME | End: 2024-10-24
Attending: INTERNAL MEDICINE | Admitting: INTERNAL MEDICINE
Payer: COMMERCIAL

## 2024-10-24 DIAGNOSIS — Z17.0 MALIGNANT NEOPLASM OF UPPER-OUTER QUADRANT OF RIGHT BREAST IN FEMALE, ESTROGEN RECEPTOR POSITIVE (H): ICD-10-CM

## 2024-10-24 DIAGNOSIS — C50.411 MALIGNANT NEOPLASM OF UPPER-OUTER QUADRANT OF RIGHT BREAST IN FEMALE, ESTROGEN RECEPTOR POSITIVE (H): ICD-10-CM

## 2024-10-24 LAB — LVEF ECHO: NORMAL

## 2024-10-24 PROCEDURE — 93321 DOPPLER ECHO F-UP/LMTD STD: CPT | Mod: 26 | Performed by: INTERNAL MEDICINE

## 2024-10-24 PROCEDURE — 93308 TTE F-UP OR LMTD: CPT

## 2024-10-24 PROCEDURE — 93325 DOPPLER ECHO COLOR FLOW MAPG: CPT

## 2024-10-24 PROCEDURE — 93308 TTE F-UP OR LMTD: CPT | Mod: 26 | Performed by: INTERNAL MEDICINE

## 2024-10-24 PROCEDURE — 93356 MYOCRD STRAIN IMG SPCKL TRCK: CPT | Performed by: INTERNAL MEDICINE

## 2024-10-24 PROCEDURE — 93325 DOPPLER ECHO COLOR FLOW MAPG: CPT | Mod: 26 | Performed by: INTERNAL MEDICINE

## 2024-10-29 RX ORDER — MEPERIDINE HYDROCHLORIDE 25 MG/ML
25 INJECTION INTRAMUSCULAR; INTRAVENOUS; SUBCUTANEOUS
Status: CANCELLED | OUTPATIENT
Start: 2024-11-08

## 2024-10-29 RX ORDER — DIPHENHYDRAMINE HYDROCHLORIDE 50 MG/ML
25 INJECTION INTRAMUSCULAR; INTRAVENOUS
Start: 2024-12-20

## 2024-10-29 RX ORDER — MEPERIDINE HYDROCHLORIDE 25 MG/ML
25 INJECTION INTRAMUSCULAR; INTRAVENOUS; SUBCUTANEOUS
OUTPATIENT
Start: 2024-12-20

## 2024-10-29 RX ORDER — ALBUTEROL SULFATE 90 UG/1
1-2 INHALANT RESPIRATORY (INHALATION)
Start: 2024-12-20

## 2024-10-29 RX ORDER — ALBUTEROL SULFATE 0.83 MG/ML
2.5 SOLUTION RESPIRATORY (INHALATION)
OUTPATIENT
Start: 2024-12-20

## 2024-10-29 RX ORDER — METHYLPREDNISOLONE SODIUM SUCCINATE 40 MG/ML
40 INJECTION INTRAMUSCULAR; INTRAVENOUS
Start: 2024-12-20

## 2024-10-29 RX ORDER — ALBUTEROL SULFATE 0.83 MG/ML
2.5 SOLUTION RESPIRATORY (INHALATION)
OUTPATIENT
Start: 2024-11-29

## 2024-10-29 RX ORDER — EPINEPHRINE 1 MG/ML
0.3 INJECTION, SOLUTION INTRAMUSCULAR; SUBCUTANEOUS EVERY 5 MIN PRN
OUTPATIENT
Start: 2024-11-29

## 2024-10-29 RX ORDER — DIPHENHYDRAMINE HYDROCHLORIDE 50 MG/ML
25 INJECTION INTRAMUSCULAR; INTRAVENOUS
Status: CANCELLED
Start: 2024-11-08

## 2024-10-29 RX ORDER — ALBUTEROL SULFATE 90 UG/1
1-2 INHALANT RESPIRATORY (INHALATION)
Start: 2024-11-29

## 2024-10-29 RX ORDER — DIPHENHYDRAMINE HYDROCHLORIDE 50 MG/ML
50 INJECTION INTRAMUSCULAR; INTRAVENOUS
Status: CANCELLED
Start: 2024-11-08

## 2024-10-29 RX ORDER — DIPHENHYDRAMINE HYDROCHLORIDE 50 MG/ML
25 INJECTION INTRAMUSCULAR; INTRAVENOUS
Start: 2024-11-29

## 2024-10-29 RX ORDER — DIPHENHYDRAMINE HYDROCHLORIDE 50 MG/ML
50 INJECTION INTRAMUSCULAR; INTRAVENOUS
Start: 2024-11-29

## 2024-10-29 RX ORDER — METHYLPREDNISOLONE SODIUM SUCCINATE 40 MG/ML
40 INJECTION INTRAMUSCULAR; INTRAVENOUS
Status: CANCELLED
Start: 2024-11-08

## 2024-10-29 RX ORDER — ALBUTEROL SULFATE 90 UG/1
1-2 INHALANT RESPIRATORY (INHALATION)
Status: CANCELLED
Start: 2024-11-08

## 2024-10-29 RX ORDER — MEPERIDINE HYDROCHLORIDE 25 MG/ML
25 INJECTION INTRAMUSCULAR; INTRAVENOUS; SUBCUTANEOUS
OUTPATIENT
Start: 2024-11-29

## 2024-10-29 RX ORDER — DIPHENHYDRAMINE HYDROCHLORIDE 50 MG/ML
50 INJECTION INTRAMUSCULAR; INTRAVENOUS
Start: 2024-12-20

## 2024-10-29 RX ORDER — ALBUTEROL SULFATE 0.83 MG/ML
2.5 SOLUTION RESPIRATORY (INHALATION)
Status: CANCELLED | OUTPATIENT
Start: 2024-11-08

## 2024-10-29 RX ORDER — EPINEPHRINE 1 MG/ML
0.3 INJECTION, SOLUTION INTRAMUSCULAR; SUBCUTANEOUS EVERY 5 MIN PRN
OUTPATIENT
Start: 2024-12-20

## 2024-10-29 RX ORDER — EPINEPHRINE 1 MG/ML
0.3 INJECTION, SOLUTION INTRAMUSCULAR; SUBCUTANEOUS EVERY 5 MIN PRN
Status: CANCELLED | OUTPATIENT
Start: 2024-11-08

## 2024-10-29 RX ORDER — METHYLPREDNISOLONE SODIUM SUCCINATE 40 MG/ML
40 INJECTION INTRAMUSCULAR; INTRAVENOUS
Start: 2024-11-29

## 2024-10-31 ENCOUNTER — TRANSFERRED RECORDS (OUTPATIENT)
Dept: HEALTH INFORMATION MANAGEMENT | Facility: CLINIC | Age: 54
End: 2024-10-31
Payer: COMMERCIAL

## 2024-10-31 DIAGNOSIS — C50.919 BREAST CANCER (H): Primary | ICD-10-CM

## 2024-11-04 ENCOUNTER — LAB (OUTPATIENT)
Dept: LAB | Facility: CLINIC | Age: 54
End: 2024-11-04
Payer: COMMERCIAL

## 2024-11-04 DIAGNOSIS — Z17.0 MALIGNANT NEOPLASM OF UPPER-OUTER QUADRANT OF RIGHT BREAST IN FEMALE, ESTROGEN RECEPTOR POSITIVE (H): Primary | ICD-10-CM

## 2024-11-04 DIAGNOSIS — C50.919 BREAST CANCER (H): ICD-10-CM

## 2024-11-04 DIAGNOSIS — C50.411 MALIGNANT NEOPLASM OF UPPER-OUTER QUADRANT OF RIGHT BREAST IN FEMALE, ESTROGEN RECEPTOR POSITIVE (H): Primary | ICD-10-CM

## 2024-11-04 LAB — HCG UR QL: NEGATIVE

## 2024-11-04 PROCEDURE — 81025 URINE PREGNANCY TEST: CPT

## 2024-11-04 RX ORDER — TAMOXIFEN CITRATE 20 MG/1
20 TABLET ORAL DAILY
Qty: 90 TABLET | Refills: 3 | Status: SHIPPED | OUTPATIENT
Start: 2024-11-04

## 2024-11-05 ENCOUNTER — VIRTUAL VISIT (OUTPATIENT)
Dept: ONCOLOGY | Facility: CLINIC | Age: 54
End: 2024-11-05
Attending: GENETIC COUNSELOR, MS
Payer: COMMERCIAL

## 2024-11-05 DIAGNOSIS — Z17.0 MALIGNANT NEOPLASM OF UPPER-OUTER QUADRANT OF RIGHT BREAST IN FEMALE, ESTROGEN RECEPTOR POSITIVE (H): Primary | ICD-10-CM

## 2024-11-05 DIAGNOSIS — C50.411 MALIGNANT NEOPLASM OF UPPER-OUTER QUADRANT OF RIGHT BREAST IN FEMALE, ESTROGEN RECEPTOR POSITIVE (H): Primary | ICD-10-CM

## 2024-11-05 DIAGNOSIS — Z80.3 FAMILY HISTORY OF MALIGNANT NEOPLASM OF BREAST: ICD-10-CM

## 2024-11-05 DIAGNOSIS — Z80.42 FAMILY HISTORY OF MALIGNANT NEOPLASM OF PROSTATE: ICD-10-CM

## 2024-11-05 PROCEDURE — 96040 HC GENETIC COUNSELING, EACH 30 MINUTES: CPT | Mod: TEL,95 | Performed by: GENETIC COUNSELOR, MS

## 2024-11-05 NOTE — PROGRESS NOTES
2024    Referring Provider: Jonathon Mariee MD    Presenting Information:   I met with Jenni for her telephone genetic counseling visit, through the Cancer Risk Management Program, to discuss her personal history of breast cancer and family history of breast and prostate cancer. Today we reviewed this history, cancer screening recommendations, and available genetic testing options.     Personal History:  Jenni is a 54 year old year old female. She was diagnosed with invasive ductal carcinoma (IDC) in her right breast at age 53 (triple positive); treatment included lumpectomy with re-excision, adjuvant chemotherapy, and adjuvant herceptin.    She had her first menstrual period at age 16, her first child at age 29, and is premenopausal. Jenni has her ovaries, fallopian tubes and uterus in place, and she has had no ovarian cancer screening to date. She reports that she has not used hormone replacement therapy.      She has annual clinical breast exams and mammograms; her most recent mammogram in May 2024 found a hypoechoic mass that was identified as IDC. Jenni began having colonoscopies at the age of 51. Her most recent colonoscopy in 2022 found two 4mm sessile serrated adenomas and follow-up was recommended in 5 years. She does not regularly do any other cancer screening at this time.     Family History: (Please see scanned pedigree for detailed family history information)  Jenni's maternal grandfather was diagnosed with metastatic prostate cancer at age 70 and  at age 74.  Jenni's paternal grandfather was diagnosed with prostate cancer in his mid-90's and  from unrelated cause at age 100.  Jenni's paternal grandmother was diagnosed with breast cancer at age 70 and  at age 91.  There is no other reported family history of cancer on either side of her family.  Her maternal ethnicity is Irish. Her paternal ethnicity is [place]. There is no known Ashkenazi [place]ian ancestry on either side  of her family. There is no reported consanguinity.    Discussion:  Jenni's personal and family history of breast and prostate cancer is suggestive of a hereditary cancer syndrome.  We reviewed the features of sporadic, familial, and hereditary cancers. We discussed that mutations in either BRCA1 or BRCA2 could be possible hereditary explanations for her family history of cancer. Mutations in the BRCA1 or BRCA2 gene are known to cause Hereditary Breast and Ovarian Cancer Syndrome (HBOC). HBOC typically presents with multiple family members diagnosed with breast cancer before age 50 and/or ovarian cancer. Other cancer risks associated with HBOC include male breast cancer, prostate cancer, pancreatic cancer, and melanoma.   We discussed the natural history and genetics of hereditary cancer. A detailed handout regarding hereditary cancer, along with the other information we discussed, will be mailed to Jenni at the end of our appointment today and can be found in the after visit summary. Topics included: inheritance pattern, cancer risks, cancer screening recommendations, and also risks, benefits and limitations of testing.  Based on her personal and family history, Jenni does not meet current National Comprehensive Cancer Network (NCCN) criteria for genetic testing of BRCA1/2.  However, according to the American Society of Breast Surgeons Consensus Guideline on Genetic Testing for Hereditary Breast Cancer, genetic testing should be available to all patients who have been diagnosed with breast cancer.  The guidelines state that testing should include BRCA1, BRCA2, and PALB2, and that additional testing may be warranted based on personal and/or family history.   We discussed that there are additional genes that could cause increased risk for breast and/or prostate cancer. As many of these genes present with overlapping features in a family and accurate cancer risk cannot always be established based upon the pedigree  analysis alone, it would be reasonable for Jenni to consider panel genetic testing to analyze multiple genes at once.  Genetic testing is available for 48 genes associated with hereditary cancer: Common Hereditary Cancers panel (APC, PATSY, AXIN2, BAP1, BARD1, BMPR1A, BRCA1, BRCA2, BRIP1, CDH1, CDK4, CDKN2A, CHEK2, CTNNA1, DICER1, EPCAM, FH, GREM1, HOXB13, KIT, MBD4, MEN1, MLH1, MSH2, MSH3, MSH6, MUTYH, NF1, NTHL1, PALB2, PDGFRA, PMS2, POLD1, POLE, PTEN, RAD51C, RAD51D, SDHA, SDHB, SDHC, SDHD, SMAD4, SMARCA4, STK11, TP53, TSC1, TSC2, and VHL).  We discussed that many of the genes in the Common Hereditary Cancers panel are associated with specific hereditary cancer syndromes and published management guidelines: Hereditary Breast and Ovarian Cancer syndrome (BRCA1, BRCA2), Hughes syndrome (MLH1, MSH2, MSH6, PMS2, EPCAM), Familial Adenomatous Polyposis (APC), Hereditary Diffuse Gastric Cancer (CDH1), Familial Atypical Multiple Mole Melanoma syndrome (CDK4, CDKN2A), Juvenile Polyposis syndrome (BMPR1A, SMAD4), Cowden syndrome (PTEN), Li Fraumeni syndrome (TP53), Peutz-Jeghers syndrome (STK11), MUTYH Associated Polyposis (MUTYH), Tuberous Sclerosis complex (TSC1, TSC2), Neurofibromatosis type 1 (NF1), Hereditary Leiomyomatosis and Renal Cell Carcinoma (FH), Multiple Endocrine Neoplasia type 1 (MEN1), Hereditary Paraganglioma and Pheochromocytoma (SDHA, SDHB, SDHC, SDHD), and von Hippel-Lindau (VHL).   The PATSY, AXIN2, BRIP1, CHEK2, GREM1, MSH3, NTHL1, PALB2, POLD1, POLE, RAD51C, and RAD51D genes are associated with increased cancer risk and have published management guidelines for certain cancers.    The remaining genes (BAP1, BARD1, CTNNA1, DICER1, HOXB13, KIT, MBD4, PDGFRA, and SMARCA4) are associated with increased cancer risk and may allow us to make medical recommendations when mutations are identified.    Jenni would like to submit a blood sample for her genetic testing. She will go to her [facility]  clinic at her earliest convenience to get her blood drawn for her genetic testing.   Verbal consent was given over the phone and written on the consent form. Turnaround time is approximately 4 weeks once the lab receives the sample.  Should Jenni have any questions regarding the billing for her genetic testing, she should contact [facility]'s billing department at [email or web address] or call them at [phone].  The possible outcomes of positive, negative, and uncertain were discussed with Jenni. A detailed handout that explains this in detail was provided to the patient.  Medical Management: For Jenni, we reviewed that the information from genetic testing may determine:  additional cancer screening for which Jenni may qualify (i.e. mammogram and breast MRI, more frequent colonoscopies, more frequent dermatologic exams, etc.),  options for risk reducing surgeries Jenni could consider (i.e. bilateral mastectomy, surgery to remove her ovaries and/or uterus, etc.),    and targeted chemotherapies for Jenni's active cancer, if indicated, or if she were to develop certain cancers in the future (i.e. immunotherapy for individuals with Hughes syndrome, PARP inhibitors, etc.).   These recommendations and possible targeted chemotherapies will be discussed in detail once genetic testing is completed.     Plan:  1) Today Jenni elected to proceed with [facility]'s Common Hereditary Cancers panel.  2) A copy of the consent form and the after visit summary will be sent to Jenni.  3) This information should be available in approximately 4 weeks, once the lab receives the sample.  4) Jenni will be contacted to schedule a follow up visit with me once the results become available.    Time spent on the phone: 28 minutes    Erik Beck MS, [facility]  Licensed, Certified Genetic Counselor      Virtual Visit Details    Type of service:  Telephone Visit     Originating Location (pt. Location): Home  Distant Location (provider  location):  Off-site

## 2024-11-05 NOTE — Clinical Note
"11/5/2024      Jenni Torres  [address]  [place] [address]      Dear Colleague,    Thank you for referring your patient, Jenni Torres, to the [facility]. Please see a copy of my visit note below.    Virtual Visit Details    Type of service:  Telephone Visit   Phone call duration: *** minutes   Originating Location (pt. Location): {patient location:977768::\"Home\"}  {PROVIDER LOCATION On-site should be selected for visits conducted from your clinic location or adjoining [facility] hospital, academic office, or other nearby [facility] building. Off-site should be selected for all other provider locations, including home:[id number]}  Distant Location (provider location):  {virtual location provider:[id number]}      Again, thank you for allowing me to participate in the care of your patient.        Sincerely,        Erik Beck GC  "

## 2024-11-05 NOTE — NURSING NOTE
Current patient location:  [facility] in [place], [place]    Is the patient currently in the state of [place]? YES    Visit mode:TELEPHONE    If the visit is dropped, the patient can be reconnected by: TELEPHONE VISIT: Phone number:   Telephone Information:   Mobile [phone]       Will anyone else be joining the visit? NO  (If patient encounters technical issues they should call [phone])    Are changes needed to the allergy or medication list? N/A    Are refills needed on medications prescribed by this physician? NO    Rooming Documentation:  Not applicable    Reason for visit: Consult    DAHLIA OLIVAREZ

## 2024-11-05 NOTE — LETTER
2024    Jenni GOGO Torres  [address]  [facility] [address]    Dear Jenni,    It was a pleasure speaking with you on the phone on 2024. Here is a copy of the progress note from our discussion. If you have any additional questions, please feel free to call.    Referring Provider: Jonathon Mariee MD    Presenting Information:   I met with Jenni for her telephone genetic counseling visit, through the Cancer Risk Management Program, to discuss her personal history of breast cancer and family history of breast and prostate cancer. Today we reviewed this history, cancer screening recommendations, and available genetic testing options.     Personal History:  Jenni is a 54 year old year old female. She was diagnosed with invasive ductal carcinoma (IDC) in her right breast at age 53 (triple positive); treatment included lumpectomy with re-excision, adjuvant chemotherapy, and adjuvant herceptin.    She had her first menstrual period at age 16, her first child at age 29, and is premenopausal. Jenni has her ovaries, fallopian tubes and uterus in place, and she has had no ovarian cancer screening to date. She reports that she has not used hormone replacement therapy.      She has annual clinical breast exams and mammograms; her most recent mammogram in May 2024 found a hypoechoic mass that was identified as IDC. Jenni began having colonoscopies at the age of 51. Her most recent colonoscopy in 2022 found two 4mm sessile serrated adenomas and follow-up was recommended in 5 years. She does not regularly do any other cancer screening at this time.     Family History: (Please see scanned pedigree for detailed family history information)  Jenni's maternal grandfather was diagnosed with metastatic prostate cancer at age 70 and  at age 74.  Jenni's paternal grandfather was diagnosed with prostate cancer in his mid-90's and  from unrelated cause at age 100.  Jenni's paternal grandmother was  diagnosed with breast cancer at age 70 and  at age 91.  There is no other reported family history of cancer on either side of her family.  Her maternal ethnicity is Irish. Her paternal ethnicity is [place]. There is no known Ashkenazi Gnosticist ancestry on either side of her family. There is no reported consanguinity.  Discussion:  Jenni's personal and family history of breast and prostate cancer is suggestive of a hereditary cancer syndrome.  We reviewed the features of sporadic, familial, and hereditary cancers. We discussed that mutations in either BRCA1 or BRCA2 could be possible hereditary explanations for her family history of cancer. Mutations in the BRCA1 or BRCA2 gene are known to cause Hereditary Breast and Ovarian Cancer Syndrome (HBOC). HBOC typically presents with multiple family members diagnosed with breast cancer before age 50 and/or ovarian cancer. Other cancer risks associated with HBOC include male breast cancer, prostate cancer, pancreatic cancer, and melanoma.   We discussed the natural history and genetics of hereditary cancer. A detailed handout regarding hereditary cancer, along with the other information we discussed, will be mailed to Jenni at the end of our appointment today and can be found in the after visit summary. Topics included: inheritance pattern, cancer risks, cancer screening recommendations, and also risks, benefits and limitations of testing.  Based on her personal and family history, Jenni does not meet current National Comprehensive Cancer Network (NCCN) criteria for genetic testing of BRCA1/2.  However, according to the American Society of Breast Surgeons Consensus Guideline on Genetic Testing for Hereditary Breast Cancer, genetic testing should be available to all patients who have been diagnosed with breast cancer.  The guidelines state that testing should include BRCA1, BRCA2, and PALB2, and that additional testing may be warranted based on personal and/or family  history.   We discussed that there are additional genes that could cause increased risk for breast and/or prostate cancer. As many of these genes present with overlapping features in a family and accurate cancer risk cannot always be established based upon the pedigree analysis alone, it would be reasonable for Jenni to consider panel genetic testing to analyze multiple genes at once.  Genetic testing is available for 48 genes associated with hereditary cancer: Common Hereditary Cancers panel (APC, PATSY, AXIN2, BAP1, BARD1, BMPR1A, BRCA1, BRCA2, BRIP1, CDH1, CDK4, CDKN2A, CHEK2, CTNNA1, DICER1, EPCAM, FH, GREM1, HOXB13, KIT, MBD4, MEN1, MLH1, MSH2, MSH3, MSH6, MUTYH, NF1, NTHL1, PALB2, PDGFRA, PMS2, POLD1, POLE, PTEN, RAD51C, RAD51D, SDHA, SDHB, SDHC, SDHD, SMAD4, SMARCA4, STK11, TP53, TSC1, TSC2, and VHL).  We discussed that many of the genes in the Common Hereditary Cancers panel are associated with specific hereditary cancer syndromes and published management guidelines: Hereditary Breast and Ovarian Cancer syndrome (BRCA1, BRCA2), Hughes syndrome (MLH1, MSH2, MSH6, PMS2, EPCAM), Familial Adenomatous Polyposis (APC), Hereditary Diffuse Gastric Cancer (CDH1), Familial Atypical Multiple Mole Melanoma syndrome (CDK4, CDKN2A), Juvenile Polyposis syndrome (BMPR1A, SMAD4), Cowden syndrome (PTEN), Li Fraumeni syndrome (TP53), Peutz-Jeghers syndrome (STK11), MUTYH Associated Polyposis (MUTYH), Tuberous Sclerosis complex (TSC1, TSC2), Neurofibromatosis type 1 (NF1), Hereditary Leiomyomatosis and Renal Cell Carcinoma (FH), Multiple Endocrine Neoplasia type 1 (MEN1), Hereditary Paraganglioma and Pheochromocytoma (SDHA, SDHB, SDHC, SDHD), and von Hippel-Lindau (VHL).   The PATSY, AXIN2, BRIP1, CHEK2, GREM1, MSH3, NTHL1, PALB2, POLD1, POLE, RAD51C, and RAD51D genes are associated with increased cancer risk and have published management guidelines for certain cancers.    The remaining genes (BAP1, BARD1, CTNNA1, DICER1, HOXB13,  KIT, MBD4, PDGFRA, and SMARCA4) are associated with increased cancer risk and may allow us to make medical recommendations when mutations are identified.    Jenni would like to submit a blood sample for her genetic testing. She will go to her [facility] at her earliest convenience to get her blood drawn for her genetic testing.   Verbal consent was given over the phone and written on the consent form. Turnaround time is approximately 4 weeks once the lab receives the sample.  Should Jenni have any questions regarding the billing for her genetic testing, she should contact [facility] billing department at [email or web address] or call them at [phone].    The possible outcomes of positive, negative, and uncertain were discussed with Jenni. A detailed handout that explains this in detail was provided to the patient.  Medical Management: For Jenni, we reviewed that the information from genetic testing may determine:  additional cancer screening for which Jenni may qualify (i.e. mammogram and breast MRI, more frequent colonoscopies, more frequent dermatologic exams, etc.),  options for risk reducing surgeries Jenni could consider (i.e. bilateral mastectomy, surgery to remove her ovaries and/or uterus, etc.),    and targeted chemotherapies for Jenni's active cancer, if indicated, or if she were to develop certain cancers in the future (i.e. immunotherapy for individuals with Hughes syndrome, PARP inhibitors, etc.).   These recommendations and possible targeted chemotherapies will be discussed in detail once genetic testing is completed.     Plan:  1) Today Jenni elected to proceed with [facility]'s Common Hereditary Cancers panel.  2) A copy of the consent form and the after visit summary will be sent to Jenni.  3) This information should be available in approximately 4 weeks, once the lab receives the sample.  4) Jenni will be contacted to schedule a follow up visit with me once  the results become available.    Time spent on the phone: 28 minutes    Erik Beck MS, [facility]  Licensed, Certified Genetic Counselor

## 2024-11-06 ENCOUNTER — INFUSION THERAPY VISIT (OUTPATIENT)
Dept: INFUSION THERAPY | Facility: CLINIC | Age: 54
End: 2024-11-06
Attending: INTERNAL MEDICINE
Payer: COMMERCIAL

## 2024-11-06 VITALS
TEMPERATURE: 98.6 F | DIASTOLIC BLOOD PRESSURE: 60 MMHG | OXYGEN SATURATION: 97 % | HEART RATE: 66 BPM | WEIGHT: 146.6 LBS | BODY MASS INDEX: 23.04 KG/M2 | RESPIRATION RATE: 16 BRPM | SYSTOLIC BLOOD PRESSURE: 93 MMHG

## 2024-11-06 DIAGNOSIS — Z17.0 MALIGNANT NEOPLASM OF UPPER-OUTER QUADRANT OF RIGHT BREAST IN FEMALE, ESTROGEN RECEPTOR POSITIVE (H): Primary | ICD-10-CM

## 2024-11-06 DIAGNOSIS — C50.411 MALIGNANT NEOPLASM OF UPPER-OUTER QUADRANT OF RIGHT BREAST IN FEMALE, ESTROGEN RECEPTOR POSITIVE (H): Primary | ICD-10-CM

## 2024-11-06 PROCEDURE — 250N000011 HC RX IP 250 OP 636: Performed by: INTERNAL MEDICINE

## 2024-11-06 PROCEDURE — 96413 CHEMO IV INFUSION 1 HR: CPT

## 2024-11-06 PROCEDURE — 96375 TX/PRO/DX INJ NEW DRUG ADDON: CPT

## 2024-11-06 PROCEDURE — 258N000003 HC RX IP 258 OP 636: Performed by: INTERNAL MEDICINE

## 2024-11-06 RX ORDER — HEPARIN SODIUM (PORCINE) LOCK FLUSH IV SOLN 100 UNIT/ML 100 UNIT/ML
5 SOLUTION INTRAVENOUS
Status: DISCONTINUED | OUTPATIENT
Start: 2024-11-06 | End: 2024-11-06 | Stop reason: HOSPADM

## 2024-11-06 RX ORDER — ONDANSETRON 2 MG/ML
8 INJECTION INTRAMUSCULAR; INTRAVENOUS ONCE
Status: COMPLETED | OUTPATIENT
Start: 2024-11-06 | End: 2024-11-06

## 2024-11-06 RX ADMIN — SODIUM CHLORIDE 380 MG: 9 INJECTION, SOLUTION INTRAVENOUS at 09:27

## 2024-11-06 RX ADMIN — HEPARIN 5 ML: 100 SYRINGE at 10:00

## 2024-11-06 RX ADMIN — ONDANSETRON 8 MG: 2 INJECTION INTRAMUSCULAR; INTRAVENOUS at 09:17

## 2024-11-06 NOTE — PATIENT INSTRUCTIONS
Assessing Cancer Risk  Cancer is a common diagnosis which impacts many families.  Individuals may develop cancer due to environmental factors (such as exposures and lifestyle), aging, genetic predisposition, or a combination of these factors.      Only about 5-10% of cancers are thought to be due to an inherited cancer susceptibility gene.    These families often have:  Several people with the same or related types of cancer  Cancers diagnosed at a young age (before age 50)  Individuals with more than one primary cancer  Multiple generations of the family affected with cancer    Comprehensive Breast and Gynecologic Cancer Panel  We each inherit two copies of every gene in our bodies: one from our mother, and one from our father. Each gene has a specific job to do.  When a gene has a mistake or  mutation  in it, it does not work like it should.     Some people may be candidates for genetic testing of more than one gene.  For these families, genetic testing using a cancer panel may be offered. These panels will test different genes at once known to increase the risk for breast, ovarian, uterine, and/or other cancers.    This handout will review common hereditary breast and gynecologic cancer syndromes. The genes that will be discussed in this handout are: PATSY, BRCA1, BRCA2, BRIP1, CDH1, CHEK2, MLH1, MSH2, MSH6, PMS2, EPCAM, PTEN, PALB2, RAD51C, RAD51D, and TP53.    The purpose of this handout is to serve as a brief summary of the breast and gynecologic cancer risk genes that have published clinical management guidelines for individuals who are found to carry a mutation. Inheriting a mutation does not mean a person will develop cancer, but it does significantly increase their risk above the general population risk.     ______________________________________________________________________________    Hereditary Breast and Ovarian Cancer Syndrome (BRCA1 and BRCA2)  A single mutation in one of the copies of BRCA1 or  BRCA2 increases the risk for breast and ovarian cancer, among others.  The risk for pancreatic cancer and melanoma may also be slightly increased in some families.  The chart below shows the chance that someone with a BRCA mutation would develop cancer in his or her lifetime1,2,3,4.       Lifetime Cancer Risks    General Population BRCA1  BRCA2   Breast  12% >60% >60%   Ovarian  1-2% 39-58% 13-29%   Prostate 12% 7-26% 19-61%   Male Breast 0.1% 0.2-1.2% 1.8-7.1%   Pancreas 1-2% Up to 5% 5-10%     A person s ethnic background is also important to consider, as individuals of Ashkenazi Orthodoxy ancestry have a higher chance of having a BRCA gene mutation.  There are three BRCA mutations that occur more frequently in this population.      Hughes Syndrome (MLH1, MSH2, MSH6, PMS2, and EPCAM)  Currently five genes are known to cause Hughes Syndrome: MLH1, MSH2, MSH6, PMS2, and EPCAM.  A single mutation in one of the Hughes Syndrome genes increases the risk for colon, endometrial, ovarian, and stomach cancers.  Other cancers that occur less commonly in Hughes Syndrome include urinary tract, skin, and brain cancers.  The chart below shows the chance that a person with Hughes syndrome would develop cancer in his or her lifetime5.      Lifetime Cancer Risks    General Population Hughes Syndrome   Colon 5% 10-61%   Endometrial 3% 13-57%   Ovarian 1-2% 1-38%   Stomach <1% 1-9%   *Cancer risk varies depending on Hughes syndrome gene found      Cowden Syndrome (PTEN)  Cowden syndrome is a hereditary condition that increases the risk for breast, thyroid, endometrial, colon, and kidney cancer.  Cowden syndrome is caused by a mutation in the PTEN gene.  A single mutation in one of the copies of PTEN causes Cowden syndrome and increases cancer risk.  The chart below shows the chance that someone with a PTEN mutation would develop cancer in their lifetime6,7.  Other benign features seen in some individuals with Cowden syndrome include benign  skin lesions (facial papules, keratoses, lipomas), learning disability, autism, thyroid nodules, colon polyps, and larger head size.     Lifetime Cancer Risks    General Population Cowden   Breast 12% 40-60%*   Thyroid 1% Up to 38%   Renal 1-2% Up to 35%   Endometrial 3% Up to 28%   Colon 5% Up to 9%   Melanoma 2-3% Up to 6%   *Emerging data suggests the risk for breast cancer could be greater than 60%               Li-Fraumeni Syndrome (TP53)  Li-Fraumeni Syndrome (LFS) is a cancer predisposition syndrome caused by a mutation in the TP53 gene. A single mutation in one of the copies of TP53 increases the risk for multiple cancers. Individuals with LFS are at an increased risk for developing cancer at a young age. The lifetime risk for development of a LFS-associated cancer is 50% by age 30 and 90% by age 60.   Core Cancers: Sarcomas, Breast, Brain, Lung, Leukemias/Lymphomas, Adrenocortical carcinomas  Other Cancers: Gastrointestinal, Thyroid, Skin, Genitourinary       Hereditary Diffuse Gastric Cancer (CDH1)  Currently, one gene is known to cause hereditary diffuse gastric cancer (HDGC): CDH1.  Individuals with HDGC are at increased risk for diffuse gastric cancer and lobular breast cancer. Of people diagnosed with HDGC, 30-50% have a mutation in the CDH1 gene.  This suggests there are likely other genes that may cause HDGC that have not been identified yet.      Lifetime Cancer Risks    General Population HDGC   Diffuse Gastric  <1% ~80%   Breast 12% 41-60%       Additional Genes    PATSY  PATSY is a moderate-risk breast cancer gene. Women who have a mutation in PATSY can have between a 2-4 fold increased risk for breast cancer compared to the general population8. PATSY mutations have also been associated with increased risk for pancreatic cancer between 5-10%9. Individuals who inherit two PATSY mutations have a condition called ataxia-telangiectasia (AT).  This rare autosomal recessive condition affects the nervous system  and immune system, and is associated with progressive cerebellar ataxia beginning in childhood. Individuals with ataxia-telangiectasia often have a weakened immune system and have an increased risk for childhood cancers.    PALB2  Mutations in PALB2 have been shown to increase the risk of breast cancer up to 41-60% in some families; where individuals fall within this risk range is dependent upon family [id number]. PALB2 mutations have also been associated with increased risk for pancreatic cancer between 5-10%.  Individuals who inherit two PALB2 mutations--one from their mother and one from their father--have a condition called Fanconi Anemia.  This rare autosomal recessive condition is associated with short stature, developmental delay, bone marrow failure, and increased risk for childhood cancers.    CHEK2   CHEK2 is a moderate-risk breast cancer gene.  Women who have a mutation in CHEK2 have around a 2-4 fold increased risk for breast cancer compared to the general population, and this risk may be higher depending upon family history.11,12,13 The risk of colon cancer may be twice as high as the general population risk of colon cancer of 5%. Mutations in CHEK2 have also been shown to increase the risk of other cancers, including prostate, however these cancer risks are currently not well understood.    BRIP1, RAD51C and RAD51D  Mutations in RAD51C and RAD51D have been shown to increase the risk of ovarian cancer and breast cancer 14,. Mutations in BRIP1 have been shown to increase the risk of ovarian cancer and possibly female breast cancer 15 .       Lifetime Cancer Risk    General Population        BRIP1   RAD51C  RAD51D   Breast 12% Not well defined 20-40% 20-40%   Ovarian 1-2% 5-15% 10-15% 10-20%     ______________________________________________________________  Inheritance  All of the cancer syndromes reviewed above are inherited in an autosomal dominant pattern.  This means that if a parent has a mutation,  each of their children will have a 50% chance of inheriting that same mutation. Therefore, each child --male or female-- would have a 50% chance of being at increased risk for developing cancer.    Image obtained from Genetics Home Reference, 2013     Mutations in some genes can occur de alverto, which means that a person s mutation occurred for the first time in them and was not inherited from a parent.  Now that they have the mutation, however, it can be passed on to future generations.    Genetic Testing  Genetic testing involves a blood test and will look for any harmful mutations that are associated with increased cancer risk.  If possible, it is recommended that the person(s) who has had cancer be tested before other family members.  That person will give us the most useful information about whether or not a specific gene is associated with the cancer in the family.    Results  There are three possible results of genetic testing:  Positive--a harmful mutation was identified in one or more of the genes  Negative--no mutations were identified in any of the genes tested  Variant of unknown significance--a variation in one of the genes was identified, but it is unclear how this impacts cancer risk in the family    Advantages and Disadvantages   There are advantages and disadvantages to genetic testing.    Advantages  May clarify your cancer risk  Can help you make medical decisions  May explain the cancers in your family  May give useful information to your family members (if you share your results)    Disadvantages  Possible negative emotional impact of learning about inherited cancer risk  Uncertainty in interpreting a negative test result in some situations  Possible genetic discrimination concerns (see below)    Genetic Information Nondiscrimination Act (NIHARIKA)  The Genetic Information Nondiscrimination Act of 2008 (NIHARIKA) is a federal law that protects individuals from health insurance or employment discrimination  based on a genetic test result alone (with some exceptions, including employers with fewer than 15 employees, and ).  Although rare, NIHARIKA  does not cover discrimination protections in terms of life insurance, long term care, or disability insurances.  Visit the National Human [employer] Research [place] website to learn more.    Reducing Cancer Risk  All of the genes described in this handout have nationally recognized cancer screening guidelines that would be recommended for individuals who test positive.  In addition to increased cancer screening, surgeries may be offered or recommended to reduce cancer risk.  Recommendations are based upon an individual s genetic test result as well as their personal and family history of cancer.    Questions to Think About Regarding Genetic Testing:  What effect will the test result have on me and my relationship with my family members if I have an inherited gene mutation?  If I don t have a gene mutation?  Should I share my test results, and how will my family react to this news, which may also affect them?  Are my children ready to learn new information that may one day affect their own health?    Hereditary Cancer Resources    FORCE: Facing Our Risk of Cancer Empowered facingourrisk.org   Bright Pink bebrightpink.org   Li-Fraumeni Syndrome Association lfsassociation.org   PTEN World PTENworld.com   No stomach for cancer, Inc. nostomachforcancer.org   Stomach cancer relief network Scrnet.org   Collaborative Group of the Americas on Inherited Colorectal Cancer (CGA) cgaicc.com    Cancer Care cancercare.org   American Cancer Society (ACS) cancer.org   National Cancer [place] (NCI) cancer.gov     Please call us if you have any questions or concerns.   Cancer Risk Management Program [phone]-[facility]-CANCER ([phone])  Erik Beck, MS [facility] [place]  [phone]  Chelsey Zacarias, MS, [facility] [phone]  Mariam Troy, MS, [facility] [place]  [phone]  Penelope Ruggiero, MS, [facility] [place]  [phone]  Stella Bhandari,  MS, [facility]  [phone]  Aster Torres, MS, [facility] [phone]  Deja Son, MS, [facility] [phone]    References  Jesus Torres PDP, Sabrina S, Ata SERRANO, Lucas JE, Kailash JL, Racquel N, Rosa Maria H, Olga O, Ekaterina A, Pasini B, Radiottoniel P, Manisaac S, Jolie DM, Ingram N, Vickie E, Rosette H, Galeano E, Rosario J, Gronlenin J, Davi B, Tulinius H, Thorlacius S, Eerola H, Nevanlinna H, Mac K, Abe OP. Average risks of breast and ovarian cancer associated with BRCA1 or BRCA2 mutations detected in case series unselected for family history: a combined analysis of 222 studies. Am J Hum Yeseina. 2003;72:1117-30.  Rosalina N, Sabrina M, Kayley G.  BRCA1 and BRCA2 Hereditary Breast and Ovarian Cancer. Gene Reviews online. 2013.  Juno YC, Katia S, Paz G, Hayes S. Breast cancer risk among male BRCA1 and BRCA2 mutation carriers. J Natl Cancer Inst. 2007;99:1811-4.  Reid DELANEY, Catarino I, Delano J, Ousmane E, Ashly ER, Tay F. Risk of breast cancer in male BRCA2 carriers. J Med Yesenia. 2010;47:710-1.  National Comprehensive Cancer Network. Clinical practice guidelines in oncology, colorectal cancer screening. Available online (registration required). 2015.  Efrem MH, Juan J, Marielle J, Edmund CAMACHO, Chapin MS, Eng C. Lifetime cancer risks in individuals with germline PTEN mutations. Clin Cancer Res. 2012;18:400-7.  Loreto R. Cowden Syndrome: A Critical Review of the Clinical Literature. J [place] . 2009:18:13-27.  Milvia SPENCER, Dandre LECHUGA, Lianne S, Whitney P, Abhijeet T, Lesley M, Dimas B, Natalia H, Bart R, Blair K, Earnestine L, Reid DELANEY, Jolie LECHUGA, Jonel DF, Geoff MR, The Breast Cancer Susceptibility Collaboration (UK) & Oc MARSHALL. PATSY mutations that cause ataxia-telangiectasia are breast cancer susceptibility alleles. Nature Genetics. 2006;38:873-875  Jonathan N , Alejandro Y, Laurel J, Nancy L, Anabel ALONZO , Lizzie ML, Wade S, Loly AG, Merlin S, Andrea ML, Amada J , Blaine R, Tawny SIMEON, Karma  JR, Andrey VE, Julianne M, Volbstein B, Andrew N, Sri RH, Sara KW, and Yenni AP. PATSY mutations in patients with hereditary pancreatic cancer. Cancer Discover. 2012;2:41-46  Ramakrishna ROBLES., et al. Breast-Cancer Risk in Families with Mutations in PALB2. NEJM. 2014; 371(6):497-506.  CHEK2 Breast Cancer Case-Control Consortium. CHEK2*1100delC and susceptibility to breast cancer: A collaborative analysis involving 10,860 breast cancer cases and 9,065 controls from 10 studies. Am J Hum [place], 74 (2004), pp. 1333-9685  Daniella T, George S, Brian K, et al. Spectrum of Mutations in BRCA1, BRCA2, CHEK2, and TP53 in Families at High Risk of Breast Cancer. MITCHELL. 2006;295(12):0681-6573.   Hector C, Inez D, Siena SPENCER, et al. Risk of breast cancer in women with a CHEK2 mutation with and without a family history of breast cancer. J Clin Oncol. 2011;29:1231-8084.  Song H, Mateuszs E, Ramus SJ, et al. Contribution of germline mutations in the RAD51B, RAD51C, and RAD51D genes to ovarian cancer in the population. J Clin Oncol. 2015;33(26):7816-8962. Doi:10.1200/JCO.2015.61.2408.  Farzaneh T, Shahana DF, Deysi P, et al. Mutations in BRIP1 confer high risk of ovarian cancer. Mora Yesenia. 2011;43(11):6040-6722. doi:10.1038/ng.955.

## 2024-11-06 NOTE — PROGRESS NOTES
Infusion Nursing Note:  Jenni Torres presents today for C14D1 Trazimera.    Patient seen by provider today: No   present during visit today: Not Applicable.    Note: Feeling well, no concerns today.       Intravenous Access:  Implanted Port.    Treatment Conditions:  ECHO/MUGA completed 10/24/24  EF 60%.      Post Infusion Assessment:  Patient tolerated infusion without incident.  Blood return noted pre and post infusion.  Site patent and intact, free from redness, edema or discomfort.  No evidence of extravasations.  Access discontinued per protocol.       Discharge Plan:   Discharge instructions reviewed with: Patient.  Patient and/or family verbalized understanding of discharge instructions and all questions answered.  AVS to patient via [employer].  Patient will return 11/26/24 for next appointment.   Patient discharged in stable condition accompanied by: self.  Departure Mode: Ambulatory.      Sharmaine Flores RN

## 2024-11-08 ENCOUNTER — LAB (OUTPATIENT)
Dept: LAB | Facility: CLINIC | Age: 54
End: 2024-11-08
Payer: COMMERCIAL

## 2024-11-08 DIAGNOSIS — C50.411 MALIGNANT NEOPLASM OF UPPER-OUTER QUADRANT OF RIGHT BREAST IN FEMALE, ESTROGEN RECEPTOR POSITIVE (H): ICD-10-CM

## 2024-11-08 DIAGNOSIS — Z80.3 FAMILY HISTORY OF MALIGNANT NEOPLASM OF BREAST: ICD-10-CM

## 2024-11-08 DIAGNOSIS — Z80.42 FAMILY HISTORY OF MALIGNANT NEOPLASM OF PROSTATE: ICD-10-CM

## 2024-11-08 DIAGNOSIS — Z17.0 MALIGNANT NEOPLASM OF UPPER-OUTER QUADRANT OF RIGHT BREAST IN FEMALE, ESTROGEN RECEPTOR POSITIVE (H): ICD-10-CM

## 2024-11-08 PROCEDURE — 36415 COLL VENOUS BLD VENIPUNCTURE: CPT

## 2024-11-10 LAB
Lab: 1102
PERFORMING LABORATORY: NORMAL
SPECIMEN STATUS: NORMAL
TEST NAME: NORMAL

## 2024-11-11 ENCOUNTER — TRANSFERRED RECORDS (OUTPATIENT)
Dept: HEALTH INFORMATION MANAGEMENT | Facility: CLINIC | Age: 54
End: 2024-11-11

## 2024-11-18 DIAGNOSIS — Z17.0 MALIGNANT NEOPLASM OF UPPER-OUTER QUADRANT OF RIGHT BREAST IN FEMALE, ESTROGEN RECEPTOR POSITIVE (H): Primary | ICD-10-CM

## 2024-11-18 DIAGNOSIS — Z80.42 FAMILY HISTORY OF MALIGNANT NEOPLASM OF PROSTATE: ICD-10-CM

## 2024-11-18 DIAGNOSIS — Z80.3 FAMILY HISTORY OF MALIGNANT NEOPLASM OF BREAST: ICD-10-CM

## 2024-11-18 DIAGNOSIS — C50.411 MALIGNANT NEOPLASM OF UPPER-OUTER QUADRANT OF RIGHT BREAST IN FEMALE, ESTROGEN RECEPTOR POSITIVE (H): Primary | ICD-10-CM

## 2024-11-19 LAB
SCANNED LAB RESULT: NORMAL
TEST NAME: NORMAL

## 2024-11-24 DIAGNOSIS — E03.9 ACQUIRED HYPOTHYROIDISM: ICD-10-CM

## 2024-11-25 RX ORDER — LEVOTHYROXINE SODIUM 137 UG/1
137 TABLET ORAL DAILY
Qty: 90 TABLET | Refills: 1 | Status: SHIPPED | OUTPATIENT
Start: 2024-11-25

## 2024-11-26 ENCOUNTER — INFUSION THERAPY VISIT (OUTPATIENT)
Dept: INFUSION THERAPY | Facility: CLINIC | Age: 54
End: 2024-11-26
Attending: PHYSICIAN ASSISTANT
Payer: COMMERCIAL

## 2024-11-26 ENCOUNTER — ONCOLOGY VISIT (OUTPATIENT)
Dept: ONCOLOGY | Facility: CLINIC | Age: 54
End: 2024-11-26
Attending: INTERNAL MEDICINE
Payer: COMMERCIAL

## 2024-11-26 VITALS
TEMPERATURE: 97.9 F | DIASTOLIC BLOOD PRESSURE: 63 MMHG | HEART RATE: 54 BPM | BODY MASS INDEX: 22.75 KG/M2 | SYSTOLIC BLOOD PRESSURE: 94 MMHG | WEIGHT: 144.8 LBS | OXYGEN SATURATION: 98 % | RESPIRATION RATE: 18 BRPM

## 2024-11-26 DIAGNOSIS — Z17.0 MALIGNANT NEOPLASM OF UPPER-OUTER QUADRANT OF RIGHT BREAST IN FEMALE, ESTROGEN RECEPTOR POSITIVE (H): Primary | ICD-10-CM

## 2024-11-26 DIAGNOSIS — C50.411 MALIGNANT NEOPLASM OF UPPER-OUTER QUADRANT OF RIGHT BREAST IN FEMALE, ESTROGEN RECEPTOR POSITIVE (H): Primary | ICD-10-CM

## 2024-11-26 LAB
BASOPHILS # BLD AUTO: 0.1 10E3/UL (ref 0–0.2)
BASOPHILS NFR BLD AUTO: 1 %
EOSINOPHIL # BLD AUTO: 0.1 10E3/UL (ref 0–0.7)
EOSINOPHIL NFR BLD AUTO: 1 %
ERYTHROCYTE [DISTWIDTH] IN BLOOD BY AUTOMATED COUNT: 13 % (ref 10–15)
HCT VFR BLD AUTO: 38.4 % (ref 35–47)
HGB BLD-MCNC: 12.4 G/DL (ref 11.7–15.7)
IMM GRANULOCYTES # BLD: 0 10E3/UL
IMM GRANULOCYTES NFR BLD: 0 %
LYMPHOCYTES # BLD AUTO: 1.2 10E3/UL (ref 0.8–5.3)
LYMPHOCYTES NFR BLD AUTO: 24 %
MCH RBC QN AUTO: 29.8 PG (ref 26.5–33)
MCHC RBC AUTO-ENTMCNC: 32.3 G/DL (ref 31.5–36.5)
MCV RBC AUTO: 92 FL (ref 78–100)
MONOCYTES # BLD AUTO: 0.4 10E3/UL (ref 0–1.3)
MONOCYTES NFR BLD AUTO: 8 %
NEUTROPHILS # BLD AUTO: 3.3 10E3/UL (ref 1.6–8.3)
NEUTROPHILS NFR BLD AUTO: 65 %
NRBC # BLD AUTO: 0 10E3/UL
NRBC BLD AUTO-RTO: 0 /100
PLATELET # BLD AUTO: 227 10E3/UL (ref 150–450)
RBC # BLD AUTO: 4.16 10E6/UL (ref 3.8–5.2)
WBC # BLD AUTO: 5.1 10E3/UL (ref 4–11)

## 2024-11-26 PROCEDURE — 85014 HEMATOCRIT: CPT | Performed by: PHYSICIAN ASSISTANT

## 2024-11-26 PROCEDURE — 250N000011 HC RX IP 250 OP 636: Performed by: INTERNAL MEDICINE

## 2024-11-26 PROCEDURE — 96413 CHEMO IV INFUSION 1 HR: CPT

## 2024-11-26 PROCEDURE — 99214 OFFICE O/P EST MOD 30 MIN: CPT | Performed by: INTERNAL MEDICINE

## 2024-11-26 PROCEDURE — 85004 AUTOMATED DIFF WBC COUNT: CPT | Performed by: PHYSICIAN ASSISTANT

## 2024-11-26 PROCEDURE — 36591 DRAW BLOOD OFF VENOUS DEVICE: CPT

## 2024-11-26 PROCEDURE — 258N000003 HC RX IP 258 OP 636: Performed by: INTERNAL MEDICINE

## 2024-11-26 PROCEDURE — 96375 TX/PRO/DX INJ NEW DRUG ADDON: CPT

## 2024-11-26 PROCEDURE — G2211 COMPLEX E/M VISIT ADD ON: HCPCS | Performed by: INTERNAL MEDICINE

## 2024-11-26 RX ORDER — ONDANSETRON 2 MG/ML
8 INJECTION INTRAMUSCULAR; INTRAVENOUS ONCE
OUTPATIENT
Start: 2025-01-10

## 2024-11-26 RX ORDER — EPINEPHRINE 1 MG/ML
0.3 INJECTION, SOLUTION INTRAMUSCULAR; SUBCUTANEOUS EVERY 5 MIN PRN
OUTPATIENT
Start: 2025-02-21

## 2024-11-26 RX ORDER — MEPERIDINE HYDROCHLORIDE 25 MG/ML
25 INJECTION INTRAMUSCULAR; INTRAVENOUS; SUBCUTANEOUS
OUTPATIENT
Start: 2025-02-21

## 2024-11-26 RX ORDER — METHYLPREDNISOLONE SODIUM SUCCINATE 40 MG/ML
40 INJECTION INTRAMUSCULAR; INTRAVENOUS
Start: 2025-02-21

## 2024-11-26 RX ORDER — ONDANSETRON 2 MG/ML
8 INJECTION INTRAMUSCULAR; INTRAVENOUS ONCE
OUTPATIENT
Start: 2025-01-31

## 2024-11-26 RX ORDER — ALBUTEROL SULFATE 0.83 MG/ML
2.5 SOLUTION RESPIRATORY (INHALATION)
OUTPATIENT
Start: 2025-01-10

## 2024-11-26 RX ORDER — LORAZEPAM 2 MG/ML
0.5 INJECTION INTRAMUSCULAR EVERY 4 HOURS PRN
OUTPATIENT
Start: 2025-02-21

## 2024-11-26 RX ORDER — HEPARIN SODIUM,PORCINE 10 UNIT/ML
5-20 VIAL (ML) INTRAVENOUS DAILY PRN
Status: CANCELLED | OUTPATIENT
Start: 2024-11-26

## 2024-11-26 RX ORDER — HEPARIN SODIUM (PORCINE) LOCK FLUSH IV SOLN 100 UNIT/ML 100 UNIT/ML
5 SOLUTION INTRAVENOUS
OUTPATIENT
Start: 2025-01-10

## 2024-11-26 RX ORDER — HEPARIN SODIUM,PORCINE 10 UNIT/ML
5-20 VIAL (ML) INTRAVENOUS DAILY PRN
OUTPATIENT
Start: 2025-01-10

## 2024-11-26 RX ORDER — DIPHENHYDRAMINE HYDROCHLORIDE 50 MG/ML
50 INJECTION INTRAMUSCULAR; INTRAVENOUS
Start: 2025-02-21

## 2024-11-26 RX ORDER — HEPARIN SODIUM (PORCINE) LOCK FLUSH IV SOLN 100 UNIT/ML 100 UNIT/ML
5 SOLUTION INTRAVENOUS
OUTPATIENT
Start: 2025-02-21

## 2024-11-26 RX ORDER — HEPARIN SODIUM (PORCINE) LOCK FLUSH IV SOLN 100 UNIT/ML 100 UNIT/ML
5 SOLUTION INTRAVENOUS
OUTPATIENT
Start: 2025-01-31

## 2024-11-26 RX ORDER — LORAZEPAM 2 MG/ML
0.5 INJECTION INTRAMUSCULAR EVERY 4 HOURS PRN
OUTPATIENT
Start: 2025-01-31

## 2024-11-26 RX ORDER — ACETAMINOPHEN 325 MG/1
650 TABLET ORAL
OUTPATIENT
Start: 2025-01-10

## 2024-11-26 RX ORDER — DIPHENHYDRAMINE HCL 25 MG
50 CAPSULE ORAL
Start: 2025-01-10

## 2024-11-26 RX ORDER — ACETAMINOPHEN 325 MG/1
650 TABLET ORAL
OUTPATIENT
Start: 2025-02-21

## 2024-11-26 RX ORDER — DIPHENHYDRAMINE HYDROCHLORIDE 50 MG/ML
50 INJECTION INTRAMUSCULAR; INTRAVENOUS
Start: 2025-01-31

## 2024-11-26 RX ORDER — HEPARIN SODIUM,PORCINE 10 UNIT/ML
5-20 VIAL (ML) INTRAVENOUS DAILY PRN
OUTPATIENT
Start: 2025-01-31

## 2024-11-26 RX ORDER — ALBUTEROL SULFATE 90 UG/1
1-2 INHALANT RESPIRATORY (INHALATION)
Start: 2025-01-31

## 2024-11-26 RX ORDER — DIPHENHYDRAMINE HYDROCHLORIDE 50 MG/ML
25 INJECTION INTRAMUSCULAR; INTRAVENOUS
Start: 2025-02-21

## 2024-11-26 RX ORDER — HEPARIN SODIUM,PORCINE 10 UNIT/ML
5-20 VIAL (ML) INTRAVENOUS DAILY PRN
OUTPATIENT
Start: 2025-02-21

## 2024-11-26 RX ORDER — EPINEPHRINE 1 MG/ML
0.3 INJECTION, SOLUTION INTRAMUSCULAR; SUBCUTANEOUS EVERY 5 MIN PRN
OUTPATIENT
Start: 2025-01-31

## 2024-11-26 RX ORDER — ACETAMINOPHEN 325 MG/1
650 TABLET ORAL
OUTPATIENT
Start: 2025-01-31

## 2024-11-26 RX ORDER — HEPARIN SODIUM (PORCINE) LOCK FLUSH IV SOLN 100 UNIT/ML 100 UNIT/ML
5 SOLUTION INTRAVENOUS
Status: CANCELLED | OUTPATIENT
Start: 2024-11-26

## 2024-11-26 RX ORDER — METHYLPREDNISOLONE SODIUM SUCCINATE 40 MG/ML
40 INJECTION INTRAMUSCULAR; INTRAVENOUS
Start: 2025-01-10

## 2024-11-26 RX ORDER — DIPHENHYDRAMINE HYDROCHLORIDE 50 MG/ML
50 INJECTION INTRAMUSCULAR; INTRAVENOUS
Start: 2025-01-10

## 2024-11-26 RX ORDER — LORAZEPAM 2 MG/ML
0.5 INJECTION INTRAMUSCULAR EVERY 4 HOURS PRN
OUTPATIENT
Start: 2025-01-10

## 2024-11-26 RX ORDER — HEPARIN SODIUM (PORCINE) LOCK FLUSH IV SOLN 100 UNIT/ML 100 UNIT/ML
5 SOLUTION INTRAVENOUS
OUTPATIENT
Start: 2024-11-26

## 2024-11-26 RX ORDER — DIPHENHYDRAMINE HCL 25 MG
50 CAPSULE ORAL
Start: 2025-02-21

## 2024-11-26 RX ORDER — ALBUTEROL SULFATE 90 UG/1
1-2 INHALANT RESPIRATORY (INHALATION)
Start: 2025-01-10

## 2024-11-26 RX ORDER — MEPERIDINE HYDROCHLORIDE 25 MG/ML
25 INJECTION INTRAMUSCULAR; INTRAVENOUS; SUBCUTANEOUS
OUTPATIENT
Start: 2025-01-10

## 2024-11-26 RX ORDER — ALBUTEROL SULFATE 90 UG/1
1-2 INHALANT RESPIRATORY (INHALATION)
Start: 2025-02-21

## 2024-11-26 RX ORDER — ONDANSETRON 2 MG/ML
8 INJECTION INTRAMUSCULAR; INTRAVENOUS ONCE
OUTPATIENT
Start: 2025-02-21

## 2024-11-26 RX ORDER — DIPHENHYDRAMINE HYDROCHLORIDE 50 MG/ML
25 INJECTION INTRAMUSCULAR; INTRAVENOUS
Start: 2025-01-10

## 2024-11-26 RX ORDER — METHYLPREDNISOLONE SODIUM SUCCINATE 40 MG/ML
40 INJECTION INTRAMUSCULAR; INTRAVENOUS
Start: 2025-01-31

## 2024-11-26 RX ORDER — ALBUTEROL SULFATE 0.83 MG/ML
2.5 SOLUTION RESPIRATORY (INHALATION)
OUTPATIENT
Start: 2025-01-31

## 2024-11-26 RX ORDER — ONDANSETRON 2 MG/ML
8 INJECTION INTRAMUSCULAR; INTRAVENOUS ONCE
Status: COMPLETED | OUTPATIENT
Start: 2024-11-26 | End: 2024-11-26

## 2024-11-26 RX ORDER — HEPARIN SODIUM,PORCINE 10 UNIT/ML
5-20 VIAL (ML) INTRAVENOUS DAILY PRN
OUTPATIENT
Start: 2024-11-26

## 2024-11-26 RX ORDER — DIPHENHYDRAMINE HYDROCHLORIDE 50 MG/ML
25 INJECTION INTRAMUSCULAR; INTRAVENOUS
Start: 2025-01-31

## 2024-11-26 RX ORDER — HEPARIN SODIUM (PORCINE) LOCK FLUSH IV SOLN 100 UNIT/ML 100 UNIT/ML
5 SOLUTION INTRAVENOUS
Status: DISCONTINUED | OUTPATIENT
Start: 2024-11-26 | End: 2024-11-26 | Stop reason: HOSPADM

## 2024-11-26 RX ORDER — EPINEPHRINE 1 MG/ML
0.3 INJECTION, SOLUTION INTRAMUSCULAR; SUBCUTANEOUS EVERY 5 MIN PRN
OUTPATIENT
Start: 2025-01-10

## 2024-11-26 RX ORDER — DIPHENHYDRAMINE HCL 25 MG
50 CAPSULE ORAL
Start: 2025-01-31

## 2024-11-26 RX ORDER — ALBUTEROL SULFATE 0.83 MG/ML
2.5 SOLUTION RESPIRATORY (INHALATION)
OUTPATIENT
Start: 2025-02-21

## 2024-11-26 RX ORDER — MEPERIDINE HYDROCHLORIDE 25 MG/ML
25 INJECTION INTRAMUSCULAR; INTRAVENOUS; SUBCUTANEOUS
OUTPATIENT
Start: 2025-01-31

## 2024-11-26 RX ADMIN — ONDANSETRON 8 MG: 2 INJECTION INTRAMUSCULAR; INTRAVENOUS at 09:37

## 2024-11-26 RX ADMIN — HEPARIN 5 ML: 100 SYRINGE at 11:00

## 2024-11-26 RX ADMIN — SODIUM CHLORIDE 380 MG: 9 INJECTION, SOLUTION INTRAVENOUS at 09:58

## 2024-11-26 ASSESSMENT — PAIN SCALES - GENERAL: PAINLEVEL_OUTOF10: NO PAIN (0)

## 2024-11-26 NOTE — NURSING NOTE
"Oncology Rooming Note    November 26, 2024 9:12 AM   Jenni Torres is a 54 year old female who presents for:    Chief Complaint   Patient presents with    Oncology Clinic Visit     Initial Vitals: BP 94/63   Pulse 54   Temp 97.9  F (36.6  C) (Oral)   Resp 18   Wt 65.7 kg (144 lb 12.8 oz)   SpO2 98%   BMI 22.75 kg/m   Estimated body mass index is 22.75 kg/m  as calculated from the following:    Height as of 10/11/24: 1.699 m (5' 6.89\").    Weight as of this encounter: 65.7 kg (144 lb 12.8 oz). Body surface area is 1.76 meters squared.  No Pain (0) Comment: Data Unavailable   No LMP recorded.  Allergies reviewed: Yes  Medications reviewed: Yes    Medications: Medication refills not needed today.  Pharmacy name entered into EPIC: [facility] - [place], [place] - [address]    Frailty Screening:   Is the patient here for a new oncology consult visit in cancer care? 2. No      Clinical concerns: f/u       Dionna Jackson CMA              "

## 2024-11-26 NOTE — LETTER
2024      Jenni Torres  [address]  [place] [address]      Dear Colleague,    Thank you for referring your patient, Jenni Torres, to the [facility] CANCER [facility]. Please see a copy of my visit note below.    Oncology progress note    Name: Jenni Torres  : 1970  MRN: [id number]    CC: Breast Cancer    HPI: Jenni Torres is a 53 year old female with a right pT1cN0 hormone receptor positive, HER2 positive breast cancer s/p lumpectomy s/p adjuvant Taxol/Herceptin currently on maintenance Herceptin here for follow up.    Had genetic testing which was negative.  Radiation in progress, due to end 12/10.   Plans to start tamoxifen after radiation ends.  Has MRI scheduled for .    History  24: right lumpectomy 1.3cm positive DCIS margins. 0/3 LN   24 re-excision: negative margins  24: week 1 Taxol/Herceptin  24: JORGITO  24: week 10 Taxol/Herceptin  10/11/24: week 12 Taxol/Herceptin  Negative genetics  Nov-Dec 2024: radiation  2025 (planned): tamoxifen    Review of systems: All other systems reviewed and are negative except for what is described in the history of present illness.      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  Last seizure  before starting medicine       Acquired hypothyroidism 2006     Priority: Medium      Allergic rhinitis 04/04/2006     Priority: Medium     Pain in joint 04/04/2006     Priority: Medium     Premenstrual tension syndrome 04/04/2006     Priority: Medium     Past Medical History:   Diagnosis Date     Hypothyroidism      Kidney stone 11/2018     Seizures (H)        Medications:   Current Outpatient Medications:      levETIRAcetam (KEPPRA) 500 MG tablet, Take 1 tablet (500 mg) by mouth daily, Disp: 90 tablet, Rfl: 3     levothyroxine (SYNTHROID/LEVOTHROID) 137 MCG tablet, Take 1 tablet by mouth daily., Disp: 90 tablet, Rfl: 1     lidocaine-prilocaine (EMLA) 2.5-2.5 % external cream, Apply topically as needed for other (apply to port site 30 minutes prior to arrival at infusion center), Disp: 30 g, Rfl: 1     Multiple Vitamin (ONE-A-DAY ESSENTIAL) TABS, Take 1 tablet by mouth daily, Disp: , Rfl:      prochlorperazine (COMPAZINE) 10 MG tablet, Take 1 tablet (10 mg) by mouth every 6 hours as needed for nausea or vomiting, Disp: 30 tablet, Rfl: 2     sertraline (ZOLOFT) 50 MG tablet, Take 1 tablet by mouth daily., Disp: 90 tablet, Rfl: 3     valACYclovir (VALTREX) 1000 mg tablet, Take 2 tablets (2,000 mg) by mouth 2 times daily, Disp: 4 tablet, Rfl: 3     tamoxifen (NOLVADEX) 20 MG tablet, Take 1 tablet (20 mg) by mouth daily. (Patient not taking: Reported on 11/26/2024), Disp: 90 tablet, Rfl: 3  No current facility-administered medications for this visit.    Facility-Administered Medications Ordered in Other Visits:      sodium chloride (PF) 0.9% PF flush 3-20 mL, 3-20 mL, Intracatheter, q1 min prn, Love Hammonds PA-C, 20 mL at 11/26/24 0912    Allergies: No Known Allergies    Family history:  Family History   Problem Relation Age of Onset     Hypertension Mother      Hyperlipidemia Mother      Obesity Mother      Hypertension Father      Hyperlipidemia Father      Asthma Maternal Grandmother      Obesity Maternal Grandmother      Prostate Cancer Paternal Grandfather      Breast Cancer  Paternal Grandmother      Thyroid Disease Paternal Grandmother        Physical exam:  Vitals:BP 94/63   Pulse 54   Temp 97.9  F (36.6  C) (Oral)   Resp 18   Wt 65.7 kg (144 lb 12.8 oz)   SpO2 98%   BMI 22.75 kg/m    GENERAL: No acute distress, pleasant, PS 0  EYES: Sclera anicteric  NECK: Supple  LYMPH NODES: No cervical, supraclavicular lymphadenopathy  CARDIOVASCULAR: Regular rate and rhythm  LUNGS: Clear to auscultation bilaterally  GASTROINTESTINAL: Soft, nontender, nondistended.  No palpable hepatosplenomegaly  EXTREMITIES: No edema  SKIN: No rashes or petechiae, port clear  BACK: No midline tenderness  NEURO: Normal gait    Labs:  Lab Results   Component Value Date    WBC 5.1 11/26/2024    HGB 12.4 11/26/2024    HCT 38.4 11/26/2024     11/26/2024     09/24/2024    POTASSIUM 4.1 09/24/2024    CHLORIDE 104 09/24/2024    CO2 25 09/24/2024    BUN 12.6 09/24/2024    CR 0.88 09/24/2024     (H) 09/24/2024    SED 8 11/26/2023    AST 19 09/24/2024    ALT 13 09/24/2024    ALKPHOS 63 09/24/2024    BILITOTAL 0.3 09/24/2024      Radiology  Echo 10/24/24  1. The left ventricle is normal in structure, function and size. The visual ejection fraction is estimated at 60%. Global peak LV longitudinal strain is averaged at -26%. This is within reported normal limits (normal <-18%).   2. The right ventricle is normal in structure, function and size.  3. No valve disease.     Assessment/plan:   Jenni Torres is a 53 year old female with a right pT1cN0 hormone receptor positive, HER2 positive breast cancer s/p lumpectomy s/p adjuvant Taxol/Herceptin currently on maintenance Herceptin here for follow up.    1.  Breast Cancer: continue adjuvant Herceptin q3 weeks to finish a year of therapy. No need for labs while on herceptin. Echo reviewed and up to date, due again Jan 2025.  Breast MRI due in Dec but given radiation, will push back to March 2025.  Mammogram due May 2025 but will likely push back to  summer 2025.  Plans to start tamoxifen in early January 2025.  Discussed that we will check hormone levels in Fall 2025 if remains amenorrheic to determine if she is in menopause. We discussed plans for monitoring.    All of the patient's questions were answered.    Return to the office in 9 weeks or sooner as needed    Thank you for allowing me to participate in the care of your patient.  Please call with any questions.    I personally reviewed the recent studies and I explained the rationale of the tests ordered today to the patient.     The longitudinal plan of care for the diagnosis(es)/condition(s) as documented were addressed during this visit. Due to the added complexity in care, I will continue to support Jenni in the subsequent management and with ongoing continuity of care.      Orders Placed This Encounter   Procedures     Echocardiogram Limited          Again, thank you for allowing me to participate in the care of your patient.        Sincerely,        Christiana Tomas MD

## 2024-11-26 NOTE — PROGRESS NOTES
Nursing Note:  Jenni Torres presents today for port labs.    Patient seen by provider today: Yes: Dr. Tomas   present during visit today: Not Applicable.    Note: N/A.    Intravenous Access:  Labs drawn without difficulty.  Implanted Port.    Discharge Plan:   Patient was sent to [facility] for provider appointment.    Sharmaine Flores RN

## 2024-11-26 NOTE — PROGRESS NOTES
Oncology progress note    Name: Jenni Torres  : 1970  MRN: [id number]    CC: Breast Cancer    HPI: Jenni Torres is a 53 year old female with a right pT1cN0 hormone receptor positive, HER2 positive breast cancer s/p lumpectomy s/p adjuvant Taxol/Herceptin currently on maintenance Herceptin here for follow up.    Had genetic testing which was negative.  Radiation in progress, due to end 12/10.   Plans to start tamoxifen after radiation ends.  Has MRI scheduled for .    History  24: right lumpectomy 1.3cm positive DCIS margins. 0/3 LN   24 re-excision: negative margins  24: week 1 Taxol/Herceptin  24: JORGITO  24: week 10 Taxol/Herceptin  10/11/24: week 12 Taxol/Herceptin  Negative genetics  Nov-Dec 2024: radiation  2025 (planned): tamoxifen    Review of systems: All other systems reviewed and are negative except for what is described in the history of present illness.      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  Last seizure  before starting medicine      Acquired hypothyroidism 2006     Priority: Medium    Allergic rhinitis 2006     Priority: Medium    Pain in joint 2006     Priority: Medium    Premenstrual tension syndrome 2006     Priority: Medium     Past Medical History:   Diagnosis Date    Hypothyroidism     Kidney stone 2018     Seizures (H)        Medications:   Current Outpatient Medications:     levETIRAcetam (KEPPRA) 500 MG tablet, Take 1 tablet (500 mg) by mouth daily, Disp: 90 tablet, Rfl: 3    levothyroxine (SYNTHROID/LEVOTHROID) 137 MCG tablet, Take 1 tablet by mouth daily., Disp: 90 tablet, Rfl: 1    lidocaine-prilocaine (EMLA) 2.5-2.5 % external cream, Apply topically as needed for other (apply to port site 30 minutes prior to arrival at infusion center), Disp: 30 g, Rfl: 1    Multiple Vitamin (ONE-A-DAY ESSENTIAL) TABS, Take 1 tablet by mouth daily, Disp: , Rfl:     prochlorperazine (COMPAZINE) 10 MG tablet, Take 1 tablet (10 mg) by mouth every 6 hours as needed for nausea or vomiting, Disp: 30 tablet, Rfl: 2    sertraline (ZOLOFT) 50 MG tablet, Take 1 tablet by mouth daily., Disp: 90 tablet, Rfl: 3    valACYclovir (VALTREX) 1000 mg tablet, Take 2 tablets (2,000 mg) by mouth 2 times daily, Disp: 4 tablet, Rfl: 3    tamoxifen (NOLVADEX) 20 MG tablet, Take 1 tablet (20 mg) by mouth daily. (Patient not taking: Reported on 11/26/2024), Disp: 90 tablet, Rfl: 3  No current facility-administered medications for this visit.    Facility-Administered Medications Ordered in Other Visits:     sodium chloride (PF) 0.9% PF flush 3-20 mL, 3-20 mL, Intracatheter, q1 min prn, Love Hammonds PA-C, 20 mL at 11/26/24 0912    Allergies: No Known Allergies    Family history:  Family History   Problem Relation Age of Onset    Hypertension Mother     Hyperlipidemia Mother     Obesity Mother     Hypertension Father     Hyperlipidemia Father     Asthma Maternal Grandmother     Obesity Maternal Grandmother     Prostate Cancer Paternal Grandfather     Breast Cancer Paternal Grandmother     Thyroid Disease Paternal Grandmother        Physical exam:  Vitals:BP 94/63   Pulse 54   Temp 97.9  F (36.6  C) (Oral)   Resp 18   Wt 65.7 kg (144 lb 12.8 oz)   SpO2 98%   BMI 22.75 kg/m    GENERAL: No acute distress, pleasant, PS 0  EYES: Sclera  anicteric  NECK: Supple  LYMPH NODES: No cervical, supraclavicular lymphadenopathy  CARDIOVASCULAR: Regular rate and rhythm  LUNGS: Clear to auscultation bilaterally  GASTROINTESTINAL: Soft, nontender, nondistended.  No palpable hepatosplenomegaly  EXTREMITIES: No edema  SKIN: No rashes or petechiae, port clear  BACK: No midline tenderness  NEURO: Normal gait    Labs:  Lab Results   Component Value Date    WBC 5.1 11/26/2024    HGB 12.4 11/26/2024    HCT 38.4 11/26/2024     11/26/2024     09/24/2024    POTASSIUM 4.1 09/24/2024    CHLORIDE 104 09/24/2024    CO2 25 09/24/2024    BUN 12.6 09/24/2024    CR 0.88 09/24/2024     (H) 09/24/2024    SED 8 11/26/2023    AST 19 09/24/2024    ALT 13 09/24/2024    ALKPHOS 63 09/24/2024    BILITOTAL 0.3 09/24/2024      Radiology  Echo 10/24/24  1. The left ventricle is normal in structure, function and size. The visual ejection fraction is estimated at 60%. Global peak LV longitudinal strain is averaged at -26%. This is within reported normal limits (normal <-18%).   2. The right ventricle is normal in structure, function and size.  3. No valve disease.     Assessment/plan:   Jenni Torres is a 53 year old female with a right pT1cN0 hormone receptor positive, HER2 positive breast cancer s/p lumpectomy s/p adjuvant Taxol/Herceptin currently on maintenance Herceptin here for follow up.    1.  Breast Cancer: continue adjuvant Herceptin q3 weeks to finish a year of therapy. No need for labs while on herceptin. Echo reviewed and up to date, due again Jan 2025.  Breast MRI due in Dec but given radiation, will push back to March 2025.  Mammogram due May 2025 but will likely push back to summer 2025.  Plans to start tamoxifen in early January 2025.  Discussed that we will check hormone levels in Fall 2025 if remains amenorrheic to determine if she is in menopause. We discussed plans for monitoring.    All of the patient's questions were answered.    Return to the  office in 9 weeks or sooner as needed    Thank you for allowing me to participate in the care of your patient.  Please call with any questions.    I personally reviewed the recent studies and I explained the rationale of the tests ordered today to the patient.     The longitudinal plan of care for the diagnosis(es)/condition(s) as documented were addressed during this visit. Due to the added complexity in care, I will continue to support Jenni in the subsequent management and with ongoing continuity of care.      Orders Placed This Encounter   Procedures    Echocardiogram Limited

## 2024-11-26 NOTE — PROGRESS NOTES
Infusion Nursing Note:    Jenni Torres presents today for D1C15 Trastuzumab    Pre-Meds - 8mg PIV zofran  .    Patient seen by provider today: Yes: Dr. Tomas     present during visit today: Not Applicable.    Note: N/A.      Intravenous Access:  Implanted Port and labs prior to infusion appt today.    Treatment Conditions:  Lab Results   Component Value Date    HGB 12.4 11/26/2024    WBC 5.1 11/26/2024    ANEU 5.3 11/22/2018    ANEUTAUTO 3.3 11/26/2024     11/26/2024        Results reviewed, labs MET treatment parameters, ok to proceed with treatment.      Post Infusion Assessment:  Patient tolerated infusion without incident.  Blood return noted pre and post infusion.  Site patent and intact, free from redness, edema or discomfort.  No evidence of extravasations.  Access discontinued per protocol.       Discharge Plan:   Discharge instructions reviewed with: Patient.  Patient and/or family verbalized understanding of discharge instructions and all questions answered.  AVS to patient via [employer].  Patient will return 1/28/25 (needs to be scheduled) for next appointment.   Patient discharged in stable condition accompanied by: self.  Departure Mode: Ambulatory.      Marivel Paris RN

## 2024-12-04 ENCOUNTER — VIRTUAL VISIT (OUTPATIENT)
Dept: ONCOLOGY | Facility: CLINIC | Age: 54
End: 2024-12-04
Attending: GENETIC COUNSELOR, MS
Payer: COMMERCIAL

## 2024-12-04 DIAGNOSIS — Z17.0 MALIGNANT NEOPLASM OF UPPER-OUTER QUADRANT OF RIGHT BREAST IN FEMALE, ESTROGEN RECEPTOR POSITIVE (H): Primary | ICD-10-CM

## 2024-12-04 DIAGNOSIS — C50.411 MALIGNANT NEOPLASM OF UPPER-OUTER QUADRANT OF RIGHT BREAST IN FEMALE, ESTROGEN RECEPTOR POSITIVE (H): Primary | ICD-10-CM

## 2024-12-04 DIAGNOSIS — Z80.42 FAMILY HISTORY OF MALIGNANT NEOPLASM OF PROSTATE: ICD-10-CM

## 2024-12-04 DIAGNOSIS — Z80.3 FAMILY HISTORY OF MALIGNANT NEOPLASM OF BREAST: ICD-10-CM

## 2024-12-04 PROCEDURE — 999N000069 HC STATISTIC GENETIC COUNSELING, < 16 MIN: Mod: TEL,95 | Performed by: GENETIC COUNSELOR, MS

## 2024-12-04 NOTE — NURSING NOTE
Current patient location:  [place], [place]    Is the patient currently in the state of [place]? YES    Visit mode:TELEPHONE    If the visit is dropped, the patient can be reconnected by: TELEPHONE VISIT: Phone number:   Telephone Information:   Mobile [phone]       Will anyone else be joining the visit? NO  (If patient encounters technical issues they should call [phone] :[phone])    How would you like to obtain your AVS? MyChart    Are changes needed to the allergy or medication list? N/A    Are refills needed on medications prescribed by this physician? NO    Reason for visit: SASCHA OLIVAREZ

## 2024-12-04 NOTE — Clinical Note
"12/4/2024      Jenni Torres  [address]  [place] [address]      Dear Colleague,    Thank you for referring your patient, Jenni Torres, to the [facility]. Please see a copy of my visit note below.    Virtual Visit Details    Type of service:  Telephone Visit   Phone call duration: *** minutes   Originating Location (pt. Location): {patient location:[id number]::\"Home\"}  {PROVIDER LOCATION On-site should be selected for visits conducted from your clinic location or adjoining [facility] hospital, academic office, or other nearby [facility] building. Off-site should be selected for all other provider locations, including home:[id number]}  Distant Location (provider location):  {virtual location provider:[id number]}    12/4/2024    Referring Provider: Jonathon Mariee MD    Presenting Information:  I spoke to Jenni by telephone today to discuss her genetic testing results. Her blood was drawn on 11/8/24. The Common Hereditary Cancers panel was ordered from [facility]. This testing was done because of Jenni's personal and family history of breast and prostate cancer.    Genetic Testing Result: NEGATIVE  Jenni is negative for any pathogenic (harmful) mutations in APC, PATSY, AXIN2, BAP1, BARD1, BMPR1A, BRCA1, BRCA2, BRIP1, CDH1, CDK4, CDKN2A, CHEK2, CTNNA1, DICER1, EPCAM, FH, GREM1, HOXB13, KIT, MBD4, MEN1, MLH1, MSH2, MSH3, MSH6, MUTYH, NF1, NTHL1, PALB2, PDGFRA, PMS2, POLD1, POLE, PTEN, RAD51C, RAD51D, SDHA, SDHB, SDHC, SDHD, SMAD4, SMARCA4, STK11, TP53, TSC1, TSC2, and VHL. No pathogenic mutations were found in any of the 48 genes analyzed. This test involved sequencing and deletion/duplication analysis of all genes with the exceptions of EPCAM and GREM1 (deletions/duplications only) and SDHA (sequencing only).     A copy of the test report can be found in the Laboratory tab, dated 11/8/24, and named \"LABORATORY MISCELLANEOUS RESULT\". The report is scanned in as a linked document.    Interpretation:  We " discussed a couple different interpretations of this negative test result.    One explanation may be that there is a different gene or combination of genes and environment that are associated with the cancers in this family.  There is also a small possibility that there is a mutation in one of these genes, and the testing laboratory could not find it with their current testing methods.       Screening:  Based on this negative test result, it is important for Jenni and her relatives to refer back to the family history for appropriate cancer screening.    Jenni's close female relatives remain at increased risk for breast cancer given their family history. Breast cancer screening is generally recommended to begin approximately 10 years younger than the earliest age of breast cancer diagnosis in the family, or at age 40, whichever comes first. In this family, screening may begin at age 40. Breast screening options should be discussed with an individual's primary care provider and a Genetic Counselor, to determine at what age to begin screening, what screening is appropriate, and if additional screening (such as breast MRI) is necessary based on personal/family history factors.   Other population cancer screening options, such as those recommended by the American Cancer Society and the National Comprehensive Cancer Network (NCCN), are also appropriate for Jenni and her family. These screening recommendations may change if there are changes to Jenni's personal and/or family history of cancer. Final screening recommendations should be made in consultation with each individual's primary care provider.      Inheritance:  We reviewed autosomal dominant inheritance. We discussed that Jenni did not pass on an identifiable mutation in these genes to her children based on this test result. Mutations in these genes do not skip generations.      Summary:  We do not have an explanation for Jenni's personal or family history  cancer. While no genetic changes were identified, Jenni may still be at risk for certain cancers due to family history, environmental factors, or other genetic causes not identified by this test. Because of that, it is important that she continue with cancer screening based on her personal and family history as discussed above.    Genetic testing is rapidly advancing, and new cancer susceptibility genes will most likely be identified in the future. Therefore, I encouraged Jenni to contact me regularly or if there are changes in her personal or family history. This may change how we assess her cancer risk, screening, and the testing we would offer.    Plan:  1.  A copy of the test results will be sent to Jenni.  2. She plans to follow-up with her other providers.  3. She should contact me regularly, or sooner if her family history changes.    If Jenni has any further questions, I encouraged her to contact me via [employer].    Time spent on the phone: *** minutes.    Erik Beck MS, [facility]  Licensed, Certified Genetic Counselor    ***      Again, thank you for allowing me to participate in the care of your patient.        Sincerely,        Erik Beck, [place]

## 2024-12-04 NOTE — PROGRESS NOTES
"12/4/2024    Referring Provider: Jonathon Mariee MD    Presenting Information:  I spoke to Jenni by telephone today to discuss her genetic testing results. Her blood was drawn on 11/8/24. The Common Hereditary Cancers panel was ordered from [employer]. This testing was done because of Jenni's personal and family history of breast and prostate cancer.    Genetic Testing Result: NEGATIVE  Jenni is negative for any pathogenic (harmful) mutations in APC, PATSY, AXIN2, BAP1, BARD1, BMPR1A, BRCA1, BRCA2, BRIP1, CDH1, CDK4, CDKN2A, CHEK2, CTNNA1, DICER1, EPCAM, FH, GREM1, HOXB13, KIT, MBD4, MEN1, MLH1, MSH2, MSH3, MSH6, MUTYH, NF1, NTHL1, PALB2, PDGFRA, PMS2, POLD1, POLE, PTEN, RAD51C, RAD51D, SDHA, SDHB, SDHC, SDHD, SMAD4, SMARCA4, STK11, TP53, TSC1, TSC2, and VHL. No pathogenic mutations were found in any of the 48 genes analyzed. This test involved sequencing and deletion/duplication analysis of all genes with the exceptions of EPCAM and GREM1 (deletions/duplications only) and SDHA (sequencing only).     A copy of the test report can be found in the Laboratory tab, dated 11/8/24, and named \"LABORATORY MISCELLANEOUS RESULT\". The report is scanned in as a linked document.    Interpretation:  We discussed a couple different interpretations of this negative test result.    One explanation may be that there is a different gene or combination of genes and environment that are associated with the cancers in this family.  There is also a small possibility that there is a mutation in one of these genes, and the testing laboratory could not find it with their current testing methods.       Screening:  Based on this negative test result, it is important for Jenni and her relatives to refer back to the family history for appropriate cancer screening.    Jenni's close female relatives remain at increased risk for breast cancer given their family history. Breast cancer screening is generally recommended to begin approximately 10 " years younger than the earliest age of breast cancer diagnosis in the family, or at age 40, whichever comes first. In this family, screening may begin at age 40. Breast screening options should be discussed with an individual's primary care provider and a Genetic Counselor, to determine at what age to begin screening, what screening is appropriate, and if additional screening (such as breast MRI) is necessary based on personal/family history factors.   Other population cancer screening options, such as those recommended by the American Cancer Society and the National Comprehensive Cancer Network (NCCN), are also appropriate for Jenni and her family. These screening recommendations may change if there are changes to Jenni's personal and/or family history of cancer. Final screening recommendations should be made in consultation with each individual's primary care provider.      Inheritance:  We reviewed autosomal dominant inheritance. We discussed that Jenni did not pass on an identifiable mutation in these genes to her children based on this test result. Mutations in these genes do not skip generations.      Summary:  We do not have an explanation for Jenni's personal or family history cancer. While no genetic changes were identified, Jenni may still be at risk for certain cancers due to family history, environmental factors, or other genetic causes not identified by this test. Because of that, it is important that she continue with cancer screening based on her personal and family history as discussed above.    Genetic testing is rapidly advancing, and new cancer susceptibility genes will most likely be identified in the future. Therefore, I encouraged Jenni to contact me regularly or if there are changes in her personal or family history. This may change how we assess her cancer risk, screening, and the testing we would offer.    Plan:  1.  A copy of the test results will be sent to Jenni.  2. She plans to  follow-up with her other providers.  3. She should contact me regularly, or sooner if her family history changes.    If Jenni has any further questions, I encouraged her to contact me via "[employer].    Time spent on the phone: 5 minutes.    Erik Beck MS, [facility] [place]  Licensed, Certified Genetic Counselor      Virtual Visit Details    Type of service:  Telephone Visit     Originating Location (pt. Location): Home  Distant Location (provider location):  Off-site

## 2024-12-04 NOTE — LETTER
"December 4, 2024    Jenni GOGO Torres  [address]  [place] [address]      Dear Jenni,    It was a pleasure speaking with you on the phone on 12/4/2024. Here is a copy of the progress note from our discussion. If you have any additional questions, please feel free to call.    Referring Provider: Jonathon Mariee MD    Presenting Information:  I spoke to Jenni by telephone today to discuss her genetic testing results. Her blood was drawn on 11/8/24. The Common Hereditary Cancers panel was ordered from [email or web address]. This testing was done because of Jenni's personal and family history of breast and prostate cancer.    Genetic Testing Result: NEGATIVE  Jenni is negative for any pathogenic (harmful) mutations in APC, PATSY, AXIN2, BAP1, BARD1, BMPR1A, BRCA1, BRCA2, BRIP1, CDH1, CDK4, CDKN2A, CHEK2, CTNNA1, DICER1, EPCAM, FH, GREM1, HOXB13, KIT, MBD4, MEN1, MLH1, MSH2, MSH3, MSH6, MUTYH, NF1, NTHL1, PALB2, PDGFRA, PMS2, POLD1, POLE, PTEN, RAD51C, RAD51D, SDHA, SDHB, SDHC, SDHD, SMAD4, SMARCA4, STK11, TP53, TSC1, TSC2, and VHL. No pathogenic mutations were found in any of the 48 genes analyzed. This test involved sequencing and deletion/duplication analysis of all genes with the exceptions of EPCAM and GREM1 (deletions/duplications only) and SDHA (sequencing only).     A copy of the test report can be found in the Laboratory tab, dated 11/8/24, and named \"LABORATORY MISCELLANEOUS RESULT\". The report is scanned in as a linked document.    Interpretation:  We discussed a couple different interpretations of this negative test result.    One explanation may be that there is a different gene or combination of genes and environment that are associated with the cancers in this family.  There is also a small possibility that there is a mutation in one of these genes, and the testing laboratory could not find it with their current testing methods.             Screening:  Based on this negative test result, it is important for " Jenni and her relatives to refer back to the family history for appropriate cancer screening.    Jenni's close female relatives remain at increased risk for breast cancer given their family history. Breast cancer screening is generally recommended to begin approximately 10 years younger than the earliest age of breast cancer diagnosis in the family, or at age 40, whichever comes first. In this family, screening may begin at age 40. Breast screening options should be discussed with an individual's primary care provider and a Genetic Counselor, to determine at what age to begin screening, what screening is appropriate, and if additional screening (such as breast MRI) is necessary based on personal/family history factors.   Other population cancer screening options, such as those recommended by the American Cancer Society and the National Comprehensive Cancer Network (NCCN), are also appropriate for Jenni and her family. These screening recommendations may change if there are changes to Jenni's personal and/or family history of cancer. Final screening recommendations should be made in consultation with each individual's primary care provider.      Inheritance:  We reviewed autosomal dominant inheritance. We discussed that Jenni did not pass on an identifiable mutation in these genes to her children based on this test result. Mutations in these genes do not skip generations.      Summary:  We do not have an explanation for Jenni's personal or family history cancer. While no genetic changes were identified, Jenni may still be at risk for certain cancers due to family history, environmental factors, or other genetic causes not identified by this test. Because of that, it is important that she continue with cancer screening based on her personal and family history as discussed above.    Genetic testing is rapidly advancing, and new cancer susceptibility genes will most likely be identified in the future. Therefore,  I encouraged Jenni to contact me regularly or if there are changes in her personal or family history. This may change how we assess her cancer risk, screening, and the testing we would offer.    Plan:  1.  A copy of the test results will be sent to Jenni.  2. She plans to follow-up with her other providers.  3. She should contact me regularly, or sooner if her family history changes.    If Jenni has any further questions, I encouraged her to contact me via [employer].    Time spent on the phone: 5 minutes.    Erik Beck MS, [facility] [place]  Licensed, Certified Genetic Counselor

## 2024-12-12 ENCOUNTER — TRANSFERRED RECORDS (OUTPATIENT)
Dept: SURGERY | Facility: CLINIC | Age: 54
End: 2024-12-12
Payer: COMMERCIAL

## 2024-12-17 ENCOUNTER — INFUSION THERAPY VISIT (OUTPATIENT)
Dept: INFUSION THERAPY | Facility: CLINIC | Age: 54
End: 2024-12-17
Attending: INTERNAL MEDICINE
Payer: COMMERCIAL

## 2024-12-17 VITALS
DIASTOLIC BLOOD PRESSURE: 60 MMHG | SYSTOLIC BLOOD PRESSURE: 94 MMHG | OXYGEN SATURATION: 97 % | HEART RATE: 65 BPM | BODY MASS INDEX: 22.94 KG/M2 | WEIGHT: 146 LBS | TEMPERATURE: 98.3 F | RESPIRATION RATE: 16 BRPM

## 2024-12-17 DIAGNOSIS — C50.411 MALIGNANT NEOPLASM OF UPPER-OUTER QUADRANT OF RIGHT BREAST IN FEMALE, ESTROGEN RECEPTOR POSITIVE (H): Primary | ICD-10-CM

## 2024-12-17 DIAGNOSIS — Z17.0 MALIGNANT NEOPLASM OF UPPER-OUTER QUADRANT OF RIGHT BREAST IN FEMALE, ESTROGEN RECEPTOR POSITIVE (H): Primary | ICD-10-CM

## 2024-12-17 PROCEDURE — 96413 CHEMO IV INFUSION 1 HR: CPT

## 2024-12-17 PROCEDURE — 258N000003 HC RX IP 258 OP 636: Performed by: INTERNAL MEDICINE

## 2024-12-17 PROCEDURE — 250N000011 HC RX IP 250 OP 636: Performed by: INTERNAL MEDICINE

## 2024-12-17 PROCEDURE — 96375 TX/PRO/DX INJ NEW DRUG ADDON: CPT

## 2024-12-17 RX ORDER — ONDANSETRON 2 MG/ML
8 INJECTION INTRAMUSCULAR; INTRAVENOUS ONCE
Status: COMPLETED | OUTPATIENT
Start: 2024-12-17 | End: 2024-12-17

## 2024-12-17 RX ORDER — HEPARIN SODIUM (PORCINE) LOCK FLUSH IV SOLN 100 UNIT/ML 100 UNIT/ML
5 SOLUTION INTRAVENOUS
Status: DISCONTINUED | OUTPATIENT
Start: 2024-12-17 | End: 2024-12-17 | Stop reason: HOSPADM

## 2024-12-17 RX ADMIN — Medication 5 ML: at 09:40

## 2024-12-17 RX ADMIN — ONDANSETRON 8 MG: 2 INJECTION INTRAMUSCULAR; INTRAVENOUS at 08:47

## 2024-12-17 RX ADMIN — SODIUM CHLORIDE 380 MG: 9 INJECTION, SOLUTION INTRAVENOUS at 09:03

## 2024-12-17 RX ADMIN — SODIUM CHLORIDE 100 ML: 9 INJECTION, SOLUTION INTRAVENOUS at 08:48

## 2024-12-17 ASSESSMENT — PAIN SCALES - GENERAL: PAINLEVEL_OUTOF10: NO PAIN (0)

## 2024-12-17 NOTE — PROGRESS NOTES
Infusion Nursing Note:    Jenni BOWENS Melissa presents today for D1C16 Trastuzumab.      Pre-meds: 8mg PIV Zofran    Patient seen by provider today: No   present during visit today: Not Applicable.    Note: Just finished radiation.  Right sided.  No blood pressures on right side per radiation, but was told to run this by Dr. Quinn as to how long to withhold blood pressures on that right side.  Denies any pain to radiation site, just some itching.      Intravenous Access:  Implanted Port.    Treatment Conditions:  Not Applicable.      Post Infusion Assessment:  Patient tolerated infusion without incident.  Blood return noted pre and post infusion.  Site patent and intact, free from redness, edema or discomfort.  No evidence of extravasations.  Access discontinued per protocol.       Discharge Plan:   Discharge instructions reviewed with: Patient.  Patient and/or family verbalized understanding of discharge instructions and all questions answered.  AVS to patient via [employer].  Patient will return 1/7/2025 for next appointment.   Patient discharged in stable condition accompanied by: self.  Departure Mode: Ambulatory.      Marivel Paris RN

## 2024-12-25 ENCOUNTER — MYC MEDICAL ADVICE (OUTPATIENT)
Dept: ONCOLOGY | Facility: CLINIC | Age: 54
End: 2024-12-25

## 2024-12-25 NOTE — LETTER
2025    To:   [facility]  [id number]  [place] [address]    RE: Jenni Torres  [address] [facility] [place] [address]  : 1970  MRN: [id number]  Policy #: [id number]   Case/Reference: [id number]    To Whom It May Concern,    I am writing on behalf of my patient, Jenni Torres to document the medical necessity of the ECHO with myocardial strain imaging completed on 10/24/2024. This letter provides information about the patient's medical history and diagnosis and a statement summarizing my treatment rationale.     Jenni Torres has a stage I HER2 positive breast cancer and is currently on maintenance Herceptin through 2025.     Standard monitoring of HER2 therapy per FDA includes measurement of ejection fraction every 3 months given the potential risk of cardiotoxicity and cardiomyopathy.  Strain is checked as a standard component of an echocardiogram and as a more sensitive measurement of subclinical cardiotoxicity before impact on ejection fraction.     In summary, an ECHO with myocardial strain imaging is medically necessary for this patient s medical condition. Please call my office at [phone] if I can provide you with any additional information to approve my request. I look forward to receiving your timely response and approval of this request.     Sincerely,    Christiana Tomas MD

## 2024-12-27 NOTE — CONFIDENTIAL NOTE
[facility]: Cancer Care                                                                                          Writer called Billing team and spoke with team member Malcolm, who advised to schedule at peer to peer for denied/partially denied claim for ECHO completed on 10/24/24. Malcolm indicated that re-submitted, even under a new CPT code, would not get approval at this point and a letter of medical necessity would not likely be sufficient either since the claim was partially covered/already submitted.     Writer called patient to update her of the above and request that our team get a copy of the denial letter so that a peer to peer can be scheduled to further assist with this. Patient to drop off Monday, 12/30/24.    Writer will also plan to outreach to prior authorization team after receiving the denial letter for scheduled ECHO on 1/24/25, in an attempt to mitigate a repeat of the denial/partial denial.     Jayna Downs RN, BSN, PHN, OCN     12/27/2024 at 3:27 PM  Nurse Care Coordinator  [facility] [place]  P: [phone]   F: [phone]

## 2024-12-31 NOTE — TELEPHONE ENCOUNTER
Writer received letter from patient Resolution of Appeal'. Writer called [facility] (P: [phone]) team, per instructions on letter. Writer spoke with team member Jonnie, who indicated that our team would need work with claim appeal team and transferred the call to [phone], [facility] claim team. Call was dropped.     Writer called back and was transferred to provider services (P: [phone]) team. Spoke with Melecio who indicate in order to set up a peer to peer our team would need the denied authorization number, which writer was not given. Don advised writer to follow up with patient to obtain this information and then call the provider services back to schedule a peer to peer. Reference # for call today: [id number].

## 2025-01-03 NOTE — TELEPHONE ENCOUNTER
"Writer called Provider Services and spoke to team member Frantz (sp?). Per Frantz, our team needs to send an Appeal Request Form with supporting documentation (letter from provider and office visit notes) before scheduling a peer to peer. Form Frantz advised our team to submit the documentation to Fax: [phone]. Reference for call today: [id number].    Writer also reached out to Neha Walker, with the prior authorization team, to inquire about what can be done for patient planned ECHO on 1/24/25. Per Neha, \"[facility] does not allow auth for 91018 (code for Mycardial strain) because it is not a covered benefit with them\". She relayed that in cases such as this, \"if provider feels like it is medically necessary, they can still proceed with it and have supporting documentation prepared for when the denial comes back. but there is no guarantee that [facility] will approve an appeal for this code. this medical policy has a little snippet saying \"Claims for this service are subject to retrospective review and denial of coverage, as investigative services are not eligible for reimbursement\".     Writer will update patient and provider and assist as needed moving forward.     Jayna Downs RN on 1/3/2025 at 2:55 PM        "

## 2025-01-07 ENCOUNTER — INFUSION THERAPY VISIT (OUTPATIENT)
Dept: INFUSION THERAPY | Facility: CLINIC | Age: 55
End: 2025-01-07
Attending: PHYSICIAN ASSISTANT
Payer: COMMERCIAL

## 2025-01-07 VITALS
DIASTOLIC BLOOD PRESSURE: 58 MMHG | BODY MASS INDEX: 22.58 KG/M2 | RESPIRATION RATE: 16 BRPM | TEMPERATURE: 98.7 F | OXYGEN SATURATION: 95 % | HEART RATE: 65 BPM | WEIGHT: 143.7 LBS | SYSTOLIC BLOOD PRESSURE: 95 MMHG

## 2025-01-07 DIAGNOSIS — C50.411 MALIGNANT NEOPLASM OF UPPER-OUTER QUADRANT OF RIGHT BREAST IN FEMALE, ESTROGEN RECEPTOR POSITIVE (H): Primary | ICD-10-CM

## 2025-01-07 DIAGNOSIS — Z17.0 MALIGNANT NEOPLASM OF UPPER-OUTER QUADRANT OF RIGHT BREAST IN FEMALE, ESTROGEN RECEPTOR POSITIVE (H): Primary | ICD-10-CM

## 2025-01-07 DIAGNOSIS — Z51.81 ENCOUNTER FOR MONITORING CARDIOTOXIC DRUG THERAPY: ICD-10-CM

## 2025-01-07 DIAGNOSIS — Z79.899 ENCOUNTER FOR MONITORING CARDIOTOXIC DRUG THERAPY: ICD-10-CM

## 2025-01-07 PROCEDURE — 258N000003 HC RX IP 258 OP 636: Performed by: INTERNAL MEDICINE

## 2025-01-07 PROCEDURE — 96413 CHEMO IV INFUSION 1 HR: CPT

## 2025-01-07 PROCEDURE — 250N000011 HC RX IP 250 OP 636: Performed by: INTERNAL MEDICINE

## 2025-01-07 PROCEDURE — 96375 TX/PRO/DX INJ NEW DRUG ADDON: CPT

## 2025-01-07 RX ORDER — HEPARIN SODIUM (PORCINE) LOCK FLUSH IV SOLN 100 UNIT/ML 100 UNIT/ML
5 SOLUTION INTRAVENOUS
Status: DISCONTINUED | OUTPATIENT
Start: 2025-01-07 | End: 2025-01-07 | Stop reason: HOSPADM

## 2025-01-07 RX ORDER — ONDANSETRON 2 MG/ML
8 INJECTION INTRAMUSCULAR; INTRAVENOUS ONCE
Status: COMPLETED | OUTPATIENT
Start: 2025-01-07 | End: 2025-01-07

## 2025-01-07 RX ADMIN — ONDANSETRON 8 MG: 2 INJECTION INTRAMUSCULAR; INTRAVENOUS at 10:06

## 2025-01-07 RX ADMIN — Medication 5 ML: at 10:45

## 2025-01-07 RX ADMIN — SODIUM CHLORIDE 250 ML: 9 INJECTION, SOLUTION INTRAVENOUS at 10:05

## 2025-01-07 RX ADMIN — SODIUM CHLORIDE 380 MG: 9 INJECTION, SOLUTION INTRAVENOUS at 10:09

## 2025-01-07 NOTE — TELEPHONE ENCOUNTER
Writer met with patient in infusion clinic today to update her that appeal request, along with letter of medical necessity and most recent office visit note, to be faxed to [facility] (F: [phone]).    Writer also updated patient that ECHO order for planned procedure on 1/24/25 has been adjusted by Dr. Tomas to not include myocardial straining. Patient aware to let the staff know completing the ECHO that day as well.     Above paperwork faxed to [facility], per patient request; confirmation of receipt via [employer].     Jayna Downs RN on 1/7/2025 at 12:02 PM

## 2025-01-07 NOTE — PROGRESS NOTES
Infusion Nursing Note:  Jenni Torres presents today for C17D1 trazimera    Patient seen by provider today: No   present during visit today: Not Applicable.    Note: n/a.      Intravenous Access:  Implanted Port.    Treatment Conditions:  Results reviewed, labs MET treatment parameters, ok to proceed with treatment.  ECHO/MUGA completed 10/24/24  EF 60%. Next echo scheduled for 1/24/25      Post Infusion Assessment:  Patient tolerated infusion without incident.  Blood return noted pre and post infusion.  Site patent and intact, free from redness, edema or discomfort.  No evidence of extravasations.  Access discontinued per protocol.       Discharge Plan:   Discharge instructions reviewed with: Patient.  Patient and/or family verbalized understanding of discharge instructions and all questions answered.  AVS to patient via [employer].  Patient will return 1/28/25 for next appointment.   Patient discharged in stable condition accompanied by: self.  Departure Mode: Ambulatory.      Alexia Vega RN

## 2025-01-24 ENCOUNTER — HOSPITAL ENCOUNTER (OUTPATIENT)
Dept: CARDIOLOGY | Facility: CLINIC | Age: 55
Discharge: HOME | End: 2025-01-24
Attending: INTERNAL MEDICINE | Admitting: INTERNAL MEDICINE
Payer: COMMERCIAL

## 2025-01-24 DIAGNOSIS — C50.411 MALIGNANT NEOPLASM OF UPPER-OUTER QUADRANT OF RIGHT BREAST IN FEMALE, ESTROGEN RECEPTOR POSITIVE (H): ICD-10-CM

## 2025-01-24 DIAGNOSIS — Z79.899 ENCOUNTER FOR MONITORING CARDIOTOXIC DRUG THERAPY: ICD-10-CM

## 2025-01-24 DIAGNOSIS — Z17.0 MALIGNANT NEOPLASM OF UPPER-OUTER QUADRANT OF RIGHT BREAST IN FEMALE, ESTROGEN RECEPTOR POSITIVE (H): ICD-10-CM

## 2025-01-24 DIAGNOSIS — Z51.81 ENCOUNTER FOR MONITORING CARDIOTOXIC DRUG THERAPY: ICD-10-CM

## 2025-01-24 LAB — BI-PLANE LVEF ECHO: NORMAL

## 2025-01-24 PROCEDURE — 93308 TTE F-UP OR LMTD: CPT | Mod: 26 | Performed by: INTERNAL MEDICINE

## 2025-01-24 PROCEDURE — 93325 DOPPLER ECHO COLOR FLOW MAPG: CPT

## 2025-01-24 PROCEDURE — 93325 DOPPLER ECHO COLOR FLOW MAPG: CPT | Mod: 26 | Performed by: INTERNAL MEDICINE

## 2025-01-24 PROCEDURE — 93321 DOPPLER ECHO F-UP/LMTD STD: CPT | Mod: 26 | Performed by: INTERNAL MEDICINE

## 2025-01-27 DIAGNOSIS — Z17.0 MALIGNANT NEOPLASM OF UPPER-OUTER QUADRANT OF RIGHT BREAST IN FEMALE, ESTROGEN RECEPTOR POSITIVE (H): Primary | ICD-10-CM

## 2025-01-27 DIAGNOSIS — C50.411 MALIGNANT NEOPLASM OF UPPER-OUTER QUADRANT OF RIGHT BREAST IN FEMALE, ESTROGEN RECEPTOR POSITIVE (H): Primary | ICD-10-CM

## 2025-01-27 NOTE — PROGRESS NOTES
Oncology/Hematology Visit Note    Jan 28, 2025    Reason for visit: Follow up breast cancer    Oncology HPI: Jenni Torres is a 54 year old female with right-sided breast cancer, ER/NY positive, HER2 positive s/p right-sided lumpectomy with sentinel lymph node biopsy.  Pathology with invasive ductal carcinoma, grade 2, measured 1.3 cm in greatest dimension and associated DCIS.  All margins were negative on the invasive tumor, but she did have positive margins on the DCIS portion.  All 3 sentinel lymph nodes were negative.    Dr. Mariee recommended Taxol/Herceptin x 12 weeks, then followed by single agent Herceptin for total x 1 year.  Echo on 7/18/2024 with EF of 60-65%.  Taxol/Herceptin C1 D1 completed on 7/25/2024 and she completed 12 cycles.    She was started on maintenance Herceptin every 3 weeks with first dose on 10/17/2024.  She has established care with Dr. Tomas with Dr. Mariee's departure.  She underwent adjuvant radiation therapy from 11/11-12/10/24.  She started tamoxifen 1/1/25. Echo 1/24/25 stable.  Plan for breast MRI in March 2025, mammogram in summer 2025.     She is here today for Herceptin.     Interval History: Jenni is here today unaccompanied.  She continues to tolerate Herceptin really well with no significant side effects.  Her daughter is in medical school and questions came up about ovarian suppression and we chatted about that today.  She denies any CP, SOB, vomiting, diarrhea, bleeding or urinary changes.    Review of Systems: See interval hx. Denies fevers, chills, HA, dizziness, CP, SOB, abdominal pain, NV, changes in urination, bruising, rash.     PMHx and Social Hx reviewed per [employer].      Medications:  Current Outpatient Medications   Medication Sig Dispense Refill    levETIRAcetam (KEPPRA) 500 MG tablet Take 1 tablet (500 mg) by mouth daily 90 tablet 3    levothyroxine (SYNTHROID/LEVOTHROID) 137 MCG tablet Take 1 tablet by mouth daily. 90 tablet 1    lidocaine-prilocaine  (EMLA) 2.5-2.5 % external cream Apply topically as needed for other (apply to port site 30 minutes prior to arrival at infusion center) 30 g 1    Multiple Vitamin (ONE-A-DAY ESSENTIAL) TABS Take 1 tablet by mouth daily      prochlorperazine (COMPAZINE) 10 MG tablet Take 1 tablet (10 mg) by mouth every 6 hours as needed for nausea or vomiting 30 tablet 2    sertraline (ZOLOFT) 50 MG tablet Take 1 tablet by mouth daily. 90 tablet 3    tamoxifen (NOLVADEX) 20 MG tablet Take 1 tablet (20 mg) by mouth daily. 90 tablet 3    valACYclovir (VALTREX) 1000 mg tablet Take 2 tablets (2,000 mg) by mouth 2 times daily 4 tablet 3       No Known Allergies      EXAM:    BP 99/64   Pulse 58   Temp 97.8  F (36.6  C) (Tympanic)   Resp 16   Wt 66.4 kg (146 lb 6.4 oz)   SpO2 98%   BMI 23.00 kg/m      GENERAL:  Female, in no acute distress.  Alert and oriented x3.   HEENT:  Normocephalic, atraumatic.  .  CV:  RRR, No murmurs, gallops, or rubs.   LUNGS:  Clear to auscultation bilaterally.   ABDOMEN:  Soft, nontender and nondistended.  Bowel sounds heard x4.  No apparent hepatosplenomegaly.   EXTREMITIES:  No clubbing, cyanosis, or edema.   SKIN: No rash  PSYCH: Calm and cooperative     Labs:     01/28/25 07:39   Sodium 140   Potassium 4.4   Chloride 106   Carbon Dioxide (CO2) 25   Urea Nitrogen 16.9   Creatinine 0.96 (H)   GFR Estimate 70   Calcium 8.7 (L)   Anion Gap 9   Albumin 4.2   Protein Total 6.5   Alkaline Phosphatase 44   ALT 9   AST 19   Bilirubin Total 0.3   Glucose 120 (H)   WBC 4.6   Hemoglobin 12.3   Hematocrit 37.8   Platelet Count 185   RBC Count 4.18   MCV 90   MCH 29.4   MCHC 32.5   RDW 12.5   % Neutrophils 63   % Lymphocytes 27   % Monocytes 7   % Eosinophils 2   % Basophils 1   Absolute Basophils 0.0   Absolute Eosinophils 0.1   Absolute Immature Granulocytes 0.0   Absolute Lymphocytes 1.2   Absolute Monocytes 0.3   % Immature Granulocytes 0   Absolute Neutrophils 2.9   Absolute NRBCs 0.0   NRBCs per 100 WBC 0      Imaging: n/a    Impression/Plan: Jenni Torres is a 53 year old female with right-sided ER/MS positive, HER2 positive breast cancer s/p lumpectomy, adjuvant Taxol/Herceptin x 12, radiation therapy and now on maintenance Herceptin.    Breast cancer: Invasive ductal carcinoma, grade 2, 1.3 cm with associated DCIS.  All margins were negative on the invasive tumor, but she did have positive margin on the DCIS.  All 3 sentinel lymph nodes negative.  She is now s/p right-sided lumpectomy, Taxol/Herceptin x 12 cycles, radiation therapy and she is now on maintenance Herceptin, tolerating this really well.  She has now established care with Dr. Tomas with Dr. Mariee's departure.  -Okay to proceed with Herceptin today, continue every 3 weeks  -Continue tamoxifen  -Spoke to Dr. Tomas, ovarian suppressant not indicated given the low risk and we will monitor hormone levels in the fall, if menopausal, likely changed to AI therapy  -Echo 1/20/2025 stable, due April 2025  -Request follow-up with Dr. Tomas 3/11/25    Abdominal cramping: Initially occurring after treatment, but last week, it occurred during infusion.  Atropine was added to the plan and improved.     Nausea: Secondary to chemotherapy, likely Taxol.  As needed.    Insomnia: Improved with melatonin.      Chart documentation with Dragon Voice recognition Software. Although reviewed after completion, some words and grammatical errors may remain.    30 minutes spent on the date of the encounter doing chart review, review of test results, interpretation of tests, patient visit, and documentation     The longitudinal plan of care for the diagnosis(es)/condition(s) as documented were addressed during this visit. Due to the added complexity in care, I will continue to support Jenni in the subsequent management and with ongoing continuity of care.    Love Coates PA-C  Hematology/Oncology  [facility]

## 2025-01-28 ENCOUNTER — ONCOLOGY VISIT (OUTPATIENT)
Dept: ONCOLOGY | Facility: CLINIC | Age: 55
End: 2025-01-28
Attending: PHYSICIAN ASSISTANT
Payer: COMMERCIAL

## 2025-01-28 ENCOUNTER — INFUSION THERAPY VISIT (OUTPATIENT)
Dept: INFUSION THERAPY | Facility: CLINIC | Age: 55
End: 2025-01-28
Attending: PHYSICIAN ASSISTANT
Payer: COMMERCIAL

## 2025-01-28 VITALS
HEART RATE: 58 BPM | BODY MASS INDEX: 23 KG/M2 | SYSTOLIC BLOOD PRESSURE: 99 MMHG | WEIGHT: 146.4 LBS | RESPIRATION RATE: 16 BRPM | OXYGEN SATURATION: 98 % | DIASTOLIC BLOOD PRESSURE: 64 MMHG | TEMPERATURE: 97.8 F

## 2025-01-28 DIAGNOSIS — Z17.0 MALIGNANT NEOPLASM OF UPPER-OUTER QUADRANT OF RIGHT BREAST IN FEMALE, ESTROGEN RECEPTOR POSITIVE (H): Primary | ICD-10-CM

## 2025-01-28 DIAGNOSIS — Z17.0 MALIGNANT NEOPLASM OF UPPER-OUTER QUADRANT OF RIGHT BREAST IN FEMALE, ESTROGEN RECEPTOR POSITIVE (H): ICD-10-CM

## 2025-01-28 DIAGNOSIS — C50.411 MALIGNANT NEOPLASM OF UPPER-OUTER QUADRANT OF RIGHT BREAST IN FEMALE, ESTROGEN RECEPTOR POSITIVE (H): Primary | ICD-10-CM

## 2025-01-28 DIAGNOSIS — C50.411 MALIGNANT NEOPLASM OF UPPER-OUTER QUADRANT OF RIGHT BREAST IN FEMALE, ESTROGEN RECEPTOR POSITIVE (H): ICD-10-CM

## 2025-01-28 LAB
ALBUMIN SERPL BCG-MCNC: 4.2 G/DL (ref 3.5–5.2)
ALP SERPL-CCNC: 44 U/L (ref 40–150)
ALT SERPL W P-5'-P-CCNC: 9 U/L (ref 0–50)
ANION GAP SERPL CALCULATED.3IONS-SCNC: 9 MMOL/L (ref 7–15)
AST SERPL W P-5'-P-CCNC: 19 U/L (ref 0–45)
BASOPHILS # BLD AUTO: 0 10E3/UL (ref 0–0.2)
BASOPHILS NFR BLD AUTO: 1 %
BILIRUB SERPL-MCNC: 0.3 MG/DL
BUN SERPL-MCNC: 16.9 MG/DL (ref 6–20)
CALCIUM SERPL-MCNC: 8.7 MG/DL (ref 8.8–10.4)
CHLORIDE SERPL-SCNC: 106 MMOL/L (ref 98–107)
CREAT SERPL-MCNC: 0.96 MG/DL (ref 0.51–0.95)
EGFRCR SERPLBLD CKD-EPI 2021: 70 ML/MIN/1.73M2
EOSINOPHIL # BLD AUTO: 0.1 10E3/UL (ref 0–0.7)
EOSINOPHIL NFR BLD AUTO: 2 %
ERYTHROCYTE [DISTWIDTH] IN BLOOD BY AUTOMATED COUNT: 12.5 % (ref 10–15)
GLUCOSE SERPL-MCNC: 120 MG/DL (ref 70–99)
HCO3 SERPL-SCNC: 25 MMOL/L (ref 22–29)
HCT VFR BLD AUTO: 37.8 % (ref 35–47)
HGB BLD-MCNC: 12.3 G/DL (ref 11.7–15.7)
IMM GRANULOCYTES # BLD: 0 10E3/UL
IMM GRANULOCYTES NFR BLD: 0 %
LYMPHOCYTES # BLD AUTO: 1.2 10E3/UL (ref 0.8–5.3)
LYMPHOCYTES NFR BLD AUTO: 27 %
MCH RBC QN AUTO: 29.4 PG (ref 26.5–33)
MCHC RBC AUTO-ENTMCNC: 32.5 G/DL (ref 31.5–36.5)
MCV RBC AUTO: 90 FL (ref 78–100)
MONOCYTES # BLD AUTO: 0.3 10E3/UL (ref 0–1.3)
MONOCYTES NFR BLD AUTO: 7 %
NEUTROPHILS # BLD AUTO: 2.9 10E3/UL (ref 1.6–8.3)
NEUTROPHILS NFR BLD AUTO: 63 %
NRBC # BLD AUTO: 0 10E3/UL
NRBC BLD AUTO-RTO: 0 /100
PLATELET # BLD AUTO: 185 10E3/UL (ref 150–450)
POTASSIUM SERPL-SCNC: 4.4 MMOL/L (ref 3.4–5.3)
PROT SERPL-MCNC: 6.5 G/DL (ref 6.4–8.3)
RBC # BLD AUTO: 4.18 10E6/UL (ref 3.8–5.2)
SODIUM SERPL-SCNC: 140 MMOL/L (ref 135–145)
WBC # BLD AUTO: 4.6 10E3/UL (ref 4–11)

## 2025-01-28 PROCEDURE — 96375 TX/PRO/DX INJ NEW DRUG ADDON: CPT

## 2025-01-28 PROCEDURE — 99214 OFFICE O/P EST MOD 30 MIN: CPT | Performed by: PHYSICIAN ASSISTANT

## 2025-01-28 PROCEDURE — 96413 CHEMO IV INFUSION 1 HR: CPT

## 2025-01-28 PROCEDURE — 85025 COMPLETE CBC W/AUTO DIFF WBC: CPT | Performed by: PHYSICIAN ASSISTANT

## 2025-01-28 PROCEDURE — 258N000003 HC RX IP 258 OP 636: Performed by: INTERNAL MEDICINE

## 2025-01-28 PROCEDURE — 250N000011 HC RX IP 250 OP 636: Performed by: INTERNAL MEDICINE

## 2025-01-28 PROCEDURE — 82310 ASSAY OF CALCIUM: CPT | Performed by: PHYSICIAN ASSISTANT

## 2025-01-28 PROCEDURE — 36591 DRAW BLOOD OFF VENOUS DEVICE: CPT

## 2025-01-28 PROCEDURE — 84075 ASSAY ALKALINE PHOSPHATASE: CPT | Performed by: PHYSICIAN ASSISTANT

## 2025-01-28 PROCEDURE — 99214 OFFICE O/P EST MOD 30 MIN: CPT | Mod: 25 | Performed by: PHYSICIAN ASSISTANT

## 2025-01-28 PROCEDURE — G2211 COMPLEX E/M VISIT ADD ON: HCPCS | Performed by: PHYSICIAN ASSISTANT

## 2025-01-28 RX ORDER — ONDANSETRON 2 MG/ML
8 INJECTION INTRAMUSCULAR; INTRAVENOUS ONCE
Status: COMPLETED | OUTPATIENT
Start: 2025-01-28 | End: 2025-01-28

## 2025-01-28 RX ORDER — HEPARIN SODIUM (PORCINE) LOCK FLUSH IV SOLN 100 UNIT/ML 100 UNIT/ML
5 SOLUTION INTRAVENOUS
Status: DISCONTINUED | OUTPATIENT
Start: 2025-01-28 | End: 2025-01-28 | Stop reason: HOSPADM

## 2025-01-28 RX ADMIN — Medication 5 ML: at 09:25

## 2025-01-28 RX ADMIN — SODIUM CHLORIDE 380 MG: 9 INJECTION, SOLUTION INTRAVENOUS at 08:54

## 2025-01-28 RX ADMIN — ONDANSETRON 8 MG: 2 INJECTION INTRAMUSCULAR; INTRAVENOUS at 08:52

## 2025-01-28 ASSESSMENT — PAIN SCALES - GENERAL: PAINLEVEL_OUTOF10: NO PAIN (0)

## 2025-01-28 NOTE — PROGRESS NOTES
Nursing Note:  Jenni Torres presents today for port labs.    Patient seen by provider today: Yes   present during visit today: Not Applicable.    Note: N/A.    Intravenous Access:  Labs drawn without difficulty.  Implanted Port.    Discharge Plan:   Patient was sent to lobby for provider appointment.    Rosa Soto RN

## 2025-01-28 NOTE — PROGRESS NOTES
Infusion Nursing Note:  Jenni Torres presents today for C18D1 Trazimera.    Patient seen by provider today: Yes: Love Coates PA-C   present during visit today: Not Applicable.    Note: N/A.      Intravenous Access:  Implanted Port, accessed in [employer].    Treatment Conditions:  Not Applicable.      Post Infusion Assessment:  Patient tolerated infusion without incident.  Blood return noted pre and post infusion.  Site patent and intact, free from redness, edema or discomfort.  No evidence of extravasations.  Access discontinued per protocol.       Discharge Plan:   Discharge instructions reviewed with: Patient.  Patient and/or family verbalized understanding of discharge instructions and all questions answered.  AVS to patient via [employer].  Patient will return 2/18 for next appointment.   Patient discharged in stable condition accompanied by: self.  Departure Mode: Ambulatory.      Mandi Lund RN

## 2025-01-28 NOTE — LETTER
1/28/2025      Jenni Torres  [address]  [place] [address]      Dear Colleague,    Thank you for referring your patient, Jenni Torres, to the [facility]. Please see a copy of my visit note below.    Oncology/Hematology Visit Note    Jan 28, 2025    Reason for visit: Follow up breast cancer    Oncology HPI: Jenni Torres is a 54 year old female with right-sided breast cancer, ER/ME positive, HER2 positive s/p right-sided lumpectomy with sentinel lymph node biopsy.  Pathology with invasive ductal carcinoma, grade 2, measured 1.3 cm in greatest dimension and associated DCIS.  All margins were negative on the invasive tumor, but she did have positive margins on the DCIS portion.  All 3 sentinel lymph nodes were negative.    Dr. Mariee recommended Taxol/Herceptin x 12 weeks, then followed by single agent Herceptin for total x 1 year.  Echo on 7/18/2024 with EF of 60-65%.  Taxol/Herceptin C1 D1 completed on 7/25/2024 and she completed 12 cycles.    She was started on maintenance Herceptin every 3 weeks with first dose on 10/17/2024.  She has established care with Dr. Tomas with Dr. Mariee's departure.  She underwent adjuvant radiation therapy from 11/11-12/10/24.  She started tamoxifen 1/1/25. Echo 1/24/25 stable.  Plan for breast MRI in March 2025, mammogram in summer 2025.     She is here today for Herceptin.     Interval History: Jenni is here today unaccompanied.  She continues to tolerate Herceptin really well with no significant side effects.  Her daughter is in medical school and questions came up about ovarian suppression and we chatted about that today.  She denies any CP, SOB, vomiting, diarrhea, bleeding or urinary changes.    Review of Systems: See interval hx. Denies fevers, chills, HA, dizziness, CP, SOB, abdominal pain, NV, changes in urination, bruising, rash.     PMHx and Social Hx reviewed per EPIC.      Medications:  Current Outpatient Medications    Medication Sig Dispense Refill     levETIRAcetam (KEPPRA) 500 MG tablet Take 1 tablet (500 mg) by mouth daily 90 tablet 3     levothyroxine (SYNTHROID/LEVOTHROID) 137 MCG tablet Take 1 tablet by mouth daily. 90 tablet 1     lidocaine-prilocaine (EMLA) 2.5-2.5 % external cream Apply topically as needed for other (apply to port site 30 minutes prior to arrival at infusion center) 30 g 1     Multiple Vitamin (ONE-A-DAY ESSENTIAL) TABS Take 1 tablet by mouth daily       prochlorperazine (COMPAZINE) 10 MG tablet Take 1 tablet (10 mg) by mouth every 6 hours as needed for nausea or vomiting 30 tablet 2     sertraline (ZOLOFT) 50 MG tablet Take 1 tablet by mouth daily. 90 tablet 3     tamoxifen (NOLVADEX) 20 MG tablet Take 1 tablet (20 mg) by mouth daily. 90 tablet 3     valACYclovir (VALTREX) 1000 mg tablet Take 2 tablets (2,000 mg) by mouth 2 times daily 4 tablet 3       No Known Allergies      EXAM:    BP 99/64   Pulse 58   Temp 97.8  F (36.6  C) (Tympanic)   Resp 16   Wt 66.4 kg (146 lb 6.4 oz)   SpO2 98%   BMI 23.00 kg/m      GENERAL:  Female, in no acute distress.  Alert and oriented x3.   HEENT:  Normocephalic, atraumatic.  .  CV:  RRR, No murmurs, gallops, or rubs.   LUNGS:  Clear to auscultation bilaterally.   ABDOMEN:  Soft, nontender and nondistended.  Bowel sounds heard x4.  No apparent hepatosplenomegaly.   EXTREMITIES:  No clubbing, cyanosis, or edema.   SKIN: No rash  PSYCH: Calm and cooperative     Labs:     01/28/25 07:39   Sodium 140   Potassium 4.4   Chloride 106   Carbon Dioxide (CO2) 25   Urea Nitrogen 16.9   Creatinine 0.96 (H)   GFR Estimate 70   Calcium 8.7 (L)   Anion Gap 9   Albumin 4.2   Protein Total 6.5   Alkaline Phosphatase 44   ALT 9   AST 19   Bilirubin Total 0.3   Glucose 120 (H)   WBC 4.6   Hemoglobin 12.3   Hematocrit 37.8   Platelet Count 185   RBC Count 4.18   MCV 90   MCH 29.4   MCHC 32.5   RDW 12.5   % Neutrophils 63   % Lymphocytes 27   % Monocytes 7   % Eosinophils 2   %  Basophils 1   Absolute Basophils 0.0   Absolute Eosinophils 0.1   Absolute Immature Granulocytes 0.0   Absolute Lymphocytes 1.2   Absolute Monocytes 0.3   % Immature Granulocytes 0   Absolute Neutrophils 2.9   Absolute NRBCs 0.0   NRBCs per 100 WBC 0     Imaging: n/a    Impression/Plan: Jenni Torres is a 53 year old female with right-sided ER/NM positive, HER2 positive breast cancer s/p lumpectomy, adjuvant Taxol/Herceptin x 12, radiation therapy and now on maintenance Herceptin.    Breast cancer: Invasive ductal carcinoma, grade 2, 1.3 cm with associated DCIS.  All margins were negative on the invasive tumor, but she did have positive margin on the DCIS.  All 3 sentinel lymph nodes negative.  She is now s/p right-sided lumpectomy, Taxol/Herceptin x 12 cycles, radiation therapy and she is now on maintenance Herceptin, tolerating this really well.  She has now established care with Dr. Tomas with Dr. Mariee's departure.  -Okay to proceed with Herceptin today, continue every 3 weeks  -Echo 1/20/2025 stable, due April 2025  -Request follow-up with Dr. Tomas 3/11/25    Abdominal cramping: Initially occurring after treatment, but last week, it occurred during infusion.  Atropine was added to the plan and improved.     Nausea: Secondary to chemotherapy, likely Taxol.  As needed.    Insomnia: Improved with melatonin.      Chart documentation with Dragon Voice recognition Software. Although reviewed after completion, some words and grammatical errors may remain.    30 minutes spent on the date of the encounter doing chart review, review of test results, interpretation of tests, patient visit, and documentation     The longitudinal plan of care for the diagnosis(es)/condition(s) as documented were addressed during this visit. Due to the added complexity in care, I will continue to support Jenni in the subsequent management and with ongoing continuity of care.    Love Coates  ELVIA  Hematology/Oncology  [facility]                  Again, thank you for allowing me to participate in the care of your patient.        Sincerely,        Love Coates PA-C    Electronically signed

## 2025-01-28 NOTE — NURSING NOTE
"Oncology Rooming Note    January 28, 2025 7:56 AM   Jenni Torres is a 54 year old female who presents for:    Chief Complaint   Patient presents with    Oncology Clinic Visit     Initial Vitals: BP (!) 86/56   Pulse 58   Temp 97.8  F (36.6  C) (Tympanic)   Resp 16   Wt 66.4 kg (146 lb 6.4 oz)   SpO2 98%   BMI 23.00 kg/m   Estimated body mass index is 23 kg/m  as calculated from the following:    Height as of 10/11/24: 1.699 m (5' 6.89\").    Weight as of this encounter: 66.4 kg (146 lb 6.4 oz). Body surface area is 1.77 meters squared.  No Pain (0) Comment: Data Unavailable   No LMP recorded.  Allergies reviewed: Yes  Medications reviewed: Yes    Medications: Medication refills not needed today.  Pharmacy name entered into EPIC: [facility] - [place], [place] - [address]    Frailty Screening:   Is the patient here for a new oncology consult visit in cancer care? 2. No      Clinical concerns: f/u       Dionna Jackson CMA              "

## 2025-02-18 ENCOUNTER — INFUSION THERAPY VISIT (OUTPATIENT)
Dept: INFUSION THERAPY | Facility: CLINIC | Age: 55
End: 2025-02-18
Attending: INTERNAL MEDICINE
Payer: COMMERCIAL

## 2025-02-18 VITALS
WEIGHT: 147.4 LBS | TEMPERATURE: 98.2 F | HEART RATE: 71 BPM | DIASTOLIC BLOOD PRESSURE: 59 MMHG | SYSTOLIC BLOOD PRESSURE: 86 MMHG | BODY MASS INDEX: 23.16 KG/M2 | OXYGEN SATURATION: 97 % | RESPIRATION RATE: 16 BRPM

## 2025-02-18 DIAGNOSIS — C50.411 MALIGNANT NEOPLASM OF UPPER-OUTER QUADRANT OF RIGHT BREAST IN FEMALE, ESTROGEN RECEPTOR POSITIVE (H): Primary | ICD-10-CM

## 2025-02-18 DIAGNOSIS — Z17.0 MALIGNANT NEOPLASM OF UPPER-OUTER QUADRANT OF RIGHT BREAST IN FEMALE, ESTROGEN RECEPTOR POSITIVE (H): Primary | ICD-10-CM

## 2025-02-18 PROCEDURE — 258N000003 HC RX IP 258 OP 636: Performed by: INTERNAL MEDICINE

## 2025-02-18 PROCEDURE — 96375 TX/PRO/DX INJ NEW DRUG ADDON: CPT

## 2025-02-18 PROCEDURE — 96413 CHEMO IV INFUSION 1 HR: CPT

## 2025-02-18 PROCEDURE — 250N000011 HC RX IP 250 OP 636: Performed by: INTERNAL MEDICINE

## 2025-02-18 RX ORDER — HEPARIN SODIUM (PORCINE) LOCK FLUSH IV SOLN 100 UNIT/ML 100 UNIT/ML
5 SOLUTION INTRAVENOUS
Status: DISCONTINUED | OUTPATIENT
Start: 2025-02-18 | End: 2025-02-18 | Stop reason: HOSPADM

## 2025-02-18 RX ORDER — ONDANSETRON 2 MG/ML
8 INJECTION INTRAMUSCULAR; INTRAVENOUS ONCE
Status: COMPLETED | OUTPATIENT
Start: 2025-02-18 | End: 2025-02-18

## 2025-02-18 RX ADMIN — Medication 5 ML: at 09:05

## 2025-02-18 RX ADMIN — SODIUM CHLORIDE 380 MG: 9 INJECTION, SOLUTION INTRAVENOUS at 08:34

## 2025-02-18 RX ADMIN — ONDANSETRON 8 MG: 2 INJECTION INTRAMUSCULAR; INTRAVENOUS at 08:30

## 2025-02-18 NOTE — PROGRESS NOTES
Infusion Nursing Note:  Jenni Torres presents today for C19D1 Trazimera.    Patient seen by provider today: No   present during visit today: Not Applicable.    Note: Patient denies any new symptoms or side effects.      Intravenous Access:  Implanted Port.    Treatment Conditions:  Results reviewed, labs MET treatment parameters, ok to proceed with treatment.  ECHO/MUGA completed 1/24/25  EF 64%.      Post Infusion Assessment:  Patient tolerated infusion without incident.  Blood return noted pre and post infusion.  Site patent and intact, free from redness, edema or discomfort.  No evidence of extravasations.  Access discontinued per protocol.       Discharge Plan:   AVS to patient via MYCHART.  Patient will return 3/11/25 for next appointment.   Patient discharged in stable condition accompanied by: self.  Departure Mode: Ambulatory.      Rosa Soto RN

## 2025-03-10 RX ORDER — DIPHENHYDRAMINE HYDROCHLORIDE 50 MG/ML
50 INJECTION, SOLUTION INTRAMUSCULAR; INTRAVENOUS
Start: 2025-04-22

## 2025-03-10 RX ORDER — ALBUTEROL SULFATE 90 UG/1
1-2 INHALANT RESPIRATORY (INHALATION)
Status: CANCELLED
Start: 2025-03-11

## 2025-03-10 RX ORDER — HEPARIN SODIUM (PORCINE) LOCK FLUSH IV SOLN 100 UNIT/ML 100 UNIT/ML
5 SOLUTION INTRAVENOUS
OUTPATIENT
Start: 2025-04-01

## 2025-03-10 RX ORDER — ACETAMINOPHEN 325 MG/1
650 TABLET ORAL
OUTPATIENT
Start: 2025-04-01

## 2025-03-10 RX ORDER — HEPARIN SODIUM,PORCINE 10 UNIT/ML
5-20 VIAL (ML) INTRAVENOUS DAILY PRN
OUTPATIENT
Start: 2025-04-01

## 2025-03-10 RX ORDER — DIPHENHYDRAMINE HYDROCHLORIDE 50 MG/ML
25 INJECTION, SOLUTION INTRAMUSCULAR; INTRAVENOUS
Status: CANCELLED
Start: 2025-03-11

## 2025-03-10 RX ORDER — ALBUTEROL SULFATE 0.83 MG/ML
2.5 SOLUTION RESPIRATORY (INHALATION)
OUTPATIENT
Start: 2025-04-22

## 2025-03-10 RX ORDER — HEPARIN SODIUM,PORCINE 10 UNIT/ML
5-20 VIAL (ML) INTRAVENOUS DAILY PRN
OUTPATIENT
Start: 2025-04-22

## 2025-03-10 RX ORDER — EPINEPHRINE 1 MG/ML
0.3 INJECTION, SOLUTION INTRAMUSCULAR; SUBCUTANEOUS EVERY 5 MIN PRN
OUTPATIENT
Start: 2025-04-22

## 2025-03-10 RX ORDER — EPINEPHRINE 1 MG/ML
0.3 INJECTION, SOLUTION INTRAMUSCULAR; SUBCUTANEOUS EVERY 5 MIN PRN
Status: CANCELLED | OUTPATIENT
Start: 2025-03-11

## 2025-03-10 RX ORDER — DIPHENHYDRAMINE HYDROCHLORIDE 50 MG/ML
50 INJECTION, SOLUTION INTRAMUSCULAR; INTRAVENOUS
Start: 2025-04-01

## 2025-03-10 RX ORDER — ALBUTEROL SULFATE 90 UG/1
1-2 INHALANT RESPIRATORY (INHALATION)
Start: 2025-04-22

## 2025-03-10 RX ORDER — EPINEPHRINE 1 MG/ML
0.3 INJECTION, SOLUTION INTRAMUSCULAR; SUBCUTANEOUS EVERY 5 MIN PRN
OUTPATIENT
Start: 2025-04-01

## 2025-03-10 RX ORDER — HEPARIN SODIUM,PORCINE 10 UNIT/ML
5-20 VIAL (ML) INTRAVENOUS DAILY PRN
Status: CANCELLED | OUTPATIENT
Start: 2025-03-11

## 2025-03-10 RX ORDER — METHYLPREDNISOLONE SODIUM SUCCINATE 40 MG/ML
40 INJECTION INTRAMUSCULAR; INTRAVENOUS
Start: 2025-04-01

## 2025-03-10 RX ORDER — ONDANSETRON 2 MG/ML
8 INJECTION INTRAMUSCULAR; INTRAVENOUS ONCE
Status: CANCELLED | OUTPATIENT
Start: 2025-03-11

## 2025-03-10 RX ORDER — ONDANSETRON 2 MG/ML
8 INJECTION INTRAMUSCULAR; INTRAVENOUS ONCE
OUTPATIENT
Start: 2025-04-01

## 2025-03-10 RX ORDER — ACETAMINOPHEN 325 MG/1
650 TABLET ORAL
Status: CANCELLED | OUTPATIENT
Start: 2025-03-11

## 2025-03-10 RX ORDER — ALBUTEROL SULFATE 90 UG/1
1-2 INHALANT RESPIRATORY (INHALATION)
Start: 2025-04-01

## 2025-03-10 RX ORDER — ACETAMINOPHEN 325 MG/1
650 TABLET ORAL
OUTPATIENT
Start: 2025-04-22

## 2025-03-10 RX ORDER — LORAZEPAM 2 MG/ML
0.5 INJECTION INTRAMUSCULAR EVERY 4 HOURS PRN
Status: CANCELLED | OUTPATIENT
Start: 2025-03-11

## 2025-03-10 RX ORDER — MEPERIDINE HYDROCHLORIDE 25 MG/ML
25 INJECTION INTRAMUSCULAR; INTRAVENOUS; SUBCUTANEOUS
Status: CANCELLED | OUTPATIENT
Start: 2025-03-11

## 2025-03-10 RX ORDER — LORAZEPAM 2 MG/ML
0.5 INJECTION INTRAMUSCULAR EVERY 4 HOURS PRN
OUTPATIENT
Start: 2025-04-01

## 2025-03-10 RX ORDER — DIPHENHYDRAMINE HYDROCHLORIDE 50 MG/ML
25 INJECTION, SOLUTION INTRAMUSCULAR; INTRAVENOUS
Start: 2025-04-22

## 2025-03-10 RX ORDER — METHYLPREDNISOLONE SODIUM SUCCINATE 40 MG/ML
40 INJECTION INTRAMUSCULAR; INTRAVENOUS
Status: CANCELLED
Start: 2025-03-11

## 2025-03-10 RX ORDER — DIPHENHYDRAMINE HYDROCHLORIDE 50 MG/ML
25 INJECTION, SOLUTION INTRAMUSCULAR; INTRAVENOUS
Start: 2025-04-01

## 2025-03-10 RX ORDER — DIPHENHYDRAMINE HCL 25 MG
50 CAPSULE ORAL
Status: CANCELLED
Start: 2025-03-11

## 2025-03-10 RX ORDER — HEPARIN SODIUM (PORCINE) LOCK FLUSH IV SOLN 100 UNIT/ML 100 UNIT/ML
5 SOLUTION INTRAVENOUS
Status: CANCELLED | OUTPATIENT
Start: 2025-03-11

## 2025-03-10 RX ORDER — ALBUTEROL SULFATE 0.83 MG/ML
2.5 SOLUTION RESPIRATORY (INHALATION)
OUTPATIENT
Start: 2025-04-01

## 2025-03-10 RX ORDER — ALBUTEROL SULFATE 0.83 MG/ML
2.5 SOLUTION RESPIRATORY (INHALATION)
Status: CANCELLED | OUTPATIENT
Start: 2025-03-11

## 2025-03-10 RX ORDER — METHYLPREDNISOLONE SODIUM SUCCINATE 40 MG/ML
40 INJECTION INTRAMUSCULAR; INTRAVENOUS
Start: 2025-04-22

## 2025-03-10 RX ORDER — MEPERIDINE HYDROCHLORIDE 25 MG/ML
25 INJECTION INTRAMUSCULAR; INTRAVENOUS; SUBCUTANEOUS
OUTPATIENT
Start: 2025-04-01

## 2025-03-10 RX ORDER — DIPHENHYDRAMINE HYDROCHLORIDE 50 MG/ML
50 INJECTION, SOLUTION INTRAMUSCULAR; INTRAVENOUS
Status: CANCELLED
Start: 2025-03-11

## 2025-03-10 RX ORDER — ONDANSETRON 2 MG/ML
8 INJECTION INTRAMUSCULAR; INTRAVENOUS ONCE
OUTPATIENT
Start: 2025-04-22

## 2025-03-10 RX ORDER — HEPARIN SODIUM (PORCINE) LOCK FLUSH IV SOLN 100 UNIT/ML 100 UNIT/ML
5 SOLUTION INTRAVENOUS
OUTPATIENT
Start: 2025-04-22

## 2025-03-10 RX ORDER — MEPERIDINE HYDROCHLORIDE 25 MG/ML
25 INJECTION INTRAMUSCULAR; INTRAVENOUS; SUBCUTANEOUS
OUTPATIENT
Start: 2025-04-22

## 2025-03-10 RX ORDER — DIPHENHYDRAMINE HCL 25 MG
50 CAPSULE ORAL
Start: 2025-04-22

## 2025-03-10 RX ORDER — DIPHENHYDRAMINE HCL 25 MG
50 CAPSULE ORAL
Start: 2025-04-01

## 2025-03-10 RX ORDER — LORAZEPAM 2 MG/ML
0.5 INJECTION INTRAMUSCULAR EVERY 4 HOURS PRN
OUTPATIENT
Start: 2025-04-22

## 2025-03-10 NOTE — PROGRESS NOTES
Oncology progress note    Name: Jenni Torres  : 1970  MRN: [id number]    CC: Breast Cancer    HPI: Jenni Torres is a 53 year old female with a right pT1cN0 hormone receptor positive, HER2 positive breast cancer s/p lumpectomy s/p adjuvant Taxol/Herceptin currently on maintenance Herceptin and tamoxifen here for follow up.    MRI scheduled 3/25/25.  Tolerating tamoxifen well.  No hot flashes. No breast masses.  No menses. Small stitch right breast incision     History  24: right lumpectomy 1.3cm positive DCIS margins. 0/3 LN   24 re-excision: negative margins  24: week 1 Taxol/Herceptin  24: JORGITO  24: week 10 Taxol/Herceptin  10/11/24: week 12 Taxol/Herceptin  Negative genetics  Nov-Dec 2024: radiation  2025: tamoxifen    Review of systems: All other systems reviewed and are negative except for what is described in the history of present illness.      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  Last seizure  before starting medicine      Acquired hypothyroidism 2006     Priority: Medium    Allergic rhinitis 2006     Priority: Medium    Pain in joint 2006     Priority: Medium    Premenstrual tension syndrome 2006     Priority: Medium     Past Medical History:   Diagnosis Date    Hypothyroidism     Kidney stone 2018    Seizures (H)         Medications:   Current Outpatient Medications:     levETIRAcetam (KEPPRA) 500 MG tablet, Take 1 tablet (500 mg) by mouth daily, Disp: 90 tablet, Rfl: 3    levothyroxine (SYNTHROID/LEVOTHROID) 137 MCG tablet, Take 1 tablet by mouth daily., Disp: 90 tablet, Rfl: 1    lidocaine-prilocaine (EMLA) 2.5-2.5 % external cream, Apply topically as needed for other (apply to port site 30 minutes prior to arrival at infusion center), Disp: 30 g, Rfl: 1    Multiple Vitamin (ONE-A-DAY ESSENTIAL) TABS, Take 1 tablet by mouth daily, Disp: , Rfl:     prochlorperazine (COMPAZINE) 10 MG tablet, Take 1 tablet (10 mg) by mouth every 6 hours as needed for nausea or vomiting, Disp: 30 tablet, Rfl: 2    sertraline (ZOLOFT) 50 MG tablet, Take 1 tablet by mouth daily., Disp: 90 tablet, Rfl: 3    tamoxifen (NOLVADEX) 20 MG tablet, Take 1 tablet (20 mg) by mouth daily., Disp: 90 tablet, Rfl: 3    valACYclovir (VALTREX) 1000 mg tablet, Take 2 tablets (2,000 mg) by mouth 2 times daily, Disp: 4 tablet, Rfl: 3  No current facility-administered medications for this visit.    Facility-Administered Medications Ordered in Other Visits:     heparin lock flush 100 unit/mL injection 5 mL, 5 mL, Intracatheter, Once PRN, Christiana Tomas MD    ondansetron (ZOFRAN) injection 8 mg, 8 mg, Intravenous, Once, Christiana Tomas MD    sodium chloride (PF) 0.9% PF flush 3-20 mL, 3-20 mL, Intracatheter, q1 min prn, Christiana Tomas MD    sodium chloride 0.9% BOLUS 250 mL, 250 mL, Intravenous, Once, Christiana Tomas MD    trastuzumab-qyyp (TRAZIMERA) 380 mg in sodium chloride 0.9 % 293.1 mL infusion, 6 mg/kg (Treatment Plan Recorded), Intravenous, Once, Christiana Tomas MD    Allergies: No Known Allergies    Family history:  Family History   Problem Relation Age of Onset    Hypertension Mother     Hyperlipidemia Mother     Obesity Mother     Hypertension Father     Hyperlipidemia Father     Asthma Maternal Grandmother     Obesity Maternal  Grandmother     Prostate Cancer Paternal Grandfather     Breast Cancer Paternal Grandmother     Thyroid Disease Paternal Grandmother        Physical exam:  Vitals:/63   Pulse 58   Temp 97.8  F (36.6  C) (Temporal)   Resp 16   Wt 64.9 kg (143 lb)   SpO2 98%   BMI 22.47 kg/m    GENERAL: No acute distress, pleasant, PS 0  EYES: Sclera anicteric  NECK: Supple  LYMPH NODES: No cervical, supraclavicular lymphadenopathy  CARDIOVASCULAR: Regular rate and rhythm  LUNGS: Clear to auscultation bilaterally  GASTROINTESTINAL: Soft, nontender, nondistended.  No palpable hepatosplenomegaly  EXTREMITIES: No edema  SKIN: No rashes or petechiae, port clear  BACK: No midline tenderness  NEURO: Normal gait  BREASTS: No dominant masses palpated bilaterally.  Right breast incision with what appears to be a stitch. No axillary lymphadenopathy bilaterally.    Labs:  Lab Results   Component Value Date    WBC 4.6 01/28/2025    HGB 12.3 01/28/2025    HCT 37.8 01/28/2025     01/28/2025     01/28/2025    POTASSIUM 4.4 01/28/2025    CHLORIDE 106 01/28/2025    CO2 25 01/28/2025    BUN 16.9 01/28/2025    CR 0.96 (H) 01/28/2025     (H) 01/28/2025    SED 8 11/26/2023    AST 19 01/28/2025    ALT 9 01/28/2025    ALKPHOS 44 01/28/2025    BILITOTAL 0.3 01/28/2025      Radiology  Echo 10/24/24  1. The left ventricle is normal in structure, function and size. The visual ejection fraction is estimated at 60%. Global peak LV longitudinal strain is averaged at -26%. This is within reported normal limits (normal <-18%).   2. The right ventricle is normal in structure, function and size.  3. No valve disease.     Assessment/plan:   Jenni Torres is a 53 year old female with a right pT1cN0 hormone receptor positive, HER2 positive breast cancer s/p lumpectomy s/p adjuvant Taxol/Herceptin currently on maintenance Herceptin and Tamoxifen here for follow up.    1.  Breast Cancer: continue adjuvant Herceptin q3 weeks to finish a  year of therapy (last due in July 2025). Plan to draw labs in July before port removed. Echo ordered for April 2025. She is tolerating tamoxifen well. Breast MRI scheduled for March 2025.  Mammogram to be done late summer 2025.  Will check hormone levels in Fall 2025 if remains amenorrheic to determine if she is in menopause with then transition to AI (will also need baseline DEXA). We discussed plans for monitoring and signs and symptoms to monitor for.    All of the patient's questions were answered.    Return to the office in 9 weeks or sooner as needed    Thank you for allowing me to participate in the care of your patient.  Please call with any questions.    I personally reviewed the recent studies and I explained the rationale of the tests ordered today to the patient.     The longitudinal plan of care for the diagnosis(es)/condition(s) as documented were addressed during this visit. Due to the added complexity in care, I will continue to support Jenni in the subsequent management and with ongoing continuity of care.      Orders Placed This Encounter   Procedures    COMPREHENSIVE METABOLIC PANEL    Vitamin D Deficiency    Echocardiogram Limited    CBC with Platelets & Differential

## 2025-03-11 ENCOUNTER — ONCOLOGY VISIT (OUTPATIENT)
Dept: ONCOLOGY | Facility: CLINIC | Age: 55
End: 2025-03-11
Attending: INTERNAL MEDICINE
Payer: COMMERCIAL

## 2025-03-11 ENCOUNTER — INFUSION THERAPY VISIT (OUTPATIENT)
Dept: INFUSION THERAPY | Facility: CLINIC | Age: 55
End: 2025-03-11
Attending: INTERNAL MEDICINE
Payer: COMMERCIAL

## 2025-03-11 VITALS
DIASTOLIC BLOOD PRESSURE: 63 MMHG | WEIGHT: 143 LBS | BODY MASS INDEX: 22.47 KG/M2 | OXYGEN SATURATION: 98 % | TEMPERATURE: 97.8 F | HEART RATE: 58 BPM | SYSTOLIC BLOOD PRESSURE: 101 MMHG | RESPIRATION RATE: 16 BRPM

## 2025-03-11 DIAGNOSIS — Z17.0 MALIGNANT NEOPLASM OF UPPER-OUTER QUADRANT OF RIGHT BREAST IN FEMALE, ESTROGEN RECEPTOR POSITIVE (H): Primary | ICD-10-CM

## 2025-03-11 DIAGNOSIS — C50.411 MALIGNANT NEOPLASM OF UPPER-OUTER QUADRANT OF RIGHT BREAST IN FEMALE, ESTROGEN RECEPTOR POSITIVE (H): Primary | ICD-10-CM

## 2025-03-11 PROCEDURE — G2211 COMPLEX E/M VISIT ADD ON: HCPCS | Performed by: INTERNAL MEDICINE

## 2025-03-11 PROCEDURE — 96413 CHEMO IV INFUSION 1 HR: CPT

## 2025-03-11 PROCEDURE — 250N000011 HC RX IP 250 OP 636: Performed by: INTERNAL MEDICINE

## 2025-03-11 PROCEDURE — 99214 OFFICE O/P EST MOD 30 MIN: CPT | Performed by: INTERNAL MEDICINE

## 2025-03-11 PROCEDURE — 258N000003 HC RX IP 258 OP 636: Performed by: INTERNAL MEDICINE

## 2025-03-11 PROCEDURE — 96375 TX/PRO/DX INJ NEW DRUG ADDON: CPT

## 2025-03-11 PROCEDURE — 99213 OFFICE O/P EST LOW 20 MIN: CPT | Performed by: INTERNAL MEDICINE

## 2025-03-11 RX ORDER — LORAZEPAM 2 MG/ML
0.5 INJECTION INTRAMUSCULAR EVERY 4 HOURS PRN
OUTPATIENT
Start: 2025-07-15

## 2025-03-11 RX ORDER — HEPARIN SODIUM,PORCINE 10 UNIT/ML
5-20 VIAL (ML) INTRAVENOUS DAILY PRN
OUTPATIENT
Start: 2025-06-24

## 2025-03-11 RX ORDER — METHYLPREDNISOLONE SODIUM SUCCINATE 40 MG/ML
40 INJECTION INTRAMUSCULAR; INTRAVENOUS
Start: 2025-07-15

## 2025-03-11 RX ORDER — DIPHENHYDRAMINE HYDROCHLORIDE 50 MG/ML
25 INJECTION, SOLUTION INTRAMUSCULAR; INTRAVENOUS
Start: 2025-05-13

## 2025-03-11 RX ORDER — DIPHENHYDRAMINE HYDROCHLORIDE 50 MG/ML
50 INJECTION, SOLUTION INTRAMUSCULAR; INTRAVENOUS
Start: 2025-07-15

## 2025-03-11 RX ORDER — EPINEPHRINE 1 MG/ML
0.3 INJECTION, SOLUTION INTRAMUSCULAR; SUBCUTANEOUS EVERY 5 MIN PRN
OUTPATIENT
Start: 2025-05-13

## 2025-03-11 RX ORDER — METHYLPREDNISOLONE SODIUM SUCCINATE 40 MG/ML
40 INJECTION INTRAMUSCULAR; INTRAVENOUS
Start: 2025-06-24

## 2025-03-11 RX ORDER — ACETAMINOPHEN 325 MG/1
650 TABLET ORAL
OUTPATIENT
Start: 2025-06-24

## 2025-03-11 RX ORDER — EPINEPHRINE 1 MG/ML
0.3 INJECTION, SOLUTION INTRAMUSCULAR; SUBCUTANEOUS EVERY 5 MIN PRN
OUTPATIENT
Start: 2025-07-15

## 2025-03-11 RX ORDER — HEPARIN SODIUM (PORCINE) LOCK FLUSH IV SOLN 100 UNIT/ML 100 UNIT/ML
5 SOLUTION INTRAVENOUS
OUTPATIENT
Start: 2025-06-03

## 2025-03-11 RX ORDER — LORAZEPAM 2 MG/ML
0.5 INJECTION INTRAMUSCULAR EVERY 4 HOURS PRN
OUTPATIENT
Start: 2025-06-24

## 2025-03-11 RX ORDER — DIPHENHYDRAMINE HCL 25 MG
50 CAPSULE ORAL
Start: 2025-05-13

## 2025-03-11 RX ORDER — ALBUTEROL SULFATE 0.83 MG/ML
2.5 SOLUTION RESPIRATORY (INHALATION)
OUTPATIENT
Start: 2025-07-15

## 2025-03-11 RX ORDER — ALBUTEROL SULFATE 90 UG/1
1-2 INHALANT RESPIRATORY (INHALATION)
Start: 2025-05-13

## 2025-03-11 RX ORDER — LORAZEPAM 2 MG/ML
0.5 INJECTION INTRAMUSCULAR EVERY 4 HOURS PRN
OUTPATIENT
Start: 2025-05-13

## 2025-03-11 RX ORDER — LORAZEPAM 2 MG/ML
0.5 INJECTION INTRAMUSCULAR EVERY 4 HOURS PRN
OUTPATIENT
Start: 2025-06-03

## 2025-03-11 RX ORDER — MEPERIDINE HYDROCHLORIDE 25 MG/ML
25 INJECTION INTRAMUSCULAR; INTRAVENOUS; SUBCUTANEOUS
OUTPATIENT
Start: 2025-05-13

## 2025-03-11 RX ORDER — ONDANSETRON 2 MG/ML
8 INJECTION INTRAMUSCULAR; INTRAVENOUS ONCE
OUTPATIENT
Start: 2025-05-13

## 2025-03-11 RX ORDER — DIPHENHYDRAMINE HYDROCHLORIDE 50 MG/ML
25 INJECTION, SOLUTION INTRAMUSCULAR; INTRAVENOUS
Start: 2025-06-24

## 2025-03-11 RX ORDER — HEPARIN SODIUM (PORCINE) LOCK FLUSH IV SOLN 100 UNIT/ML 100 UNIT/ML
5 SOLUTION INTRAVENOUS
OUTPATIENT
Start: 2025-06-24

## 2025-03-11 RX ORDER — MEPERIDINE HYDROCHLORIDE 25 MG/ML
25 INJECTION INTRAMUSCULAR; INTRAVENOUS; SUBCUTANEOUS
OUTPATIENT
Start: 2025-06-24

## 2025-03-11 RX ORDER — ONDANSETRON 2 MG/ML
8 INJECTION INTRAMUSCULAR; INTRAVENOUS ONCE
OUTPATIENT
Start: 2025-06-24

## 2025-03-11 RX ORDER — EPINEPHRINE 1 MG/ML
0.3 INJECTION, SOLUTION INTRAMUSCULAR; SUBCUTANEOUS EVERY 5 MIN PRN
OUTPATIENT
Start: 2025-06-24

## 2025-03-11 RX ORDER — DIPHENHYDRAMINE HYDROCHLORIDE 50 MG/ML
50 INJECTION, SOLUTION INTRAMUSCULAR; INTRAVENOUS
Start: 2025-06-24

## 2025-03-11 RX ORDER — METHYLPREDNISOLONE SODIUM SUCCINATE 40 MG/ML
40 INJECTION INTRAMUSCULAR; INTRAVENOUS
Start: 2025-05-13

## 2025-03-11 RX ORDER — ALBUTEROL SULFATE 90 UG/1
1-2 INHALANT RESPIRATORY (INHALATION)
Start: 2025-07-15

## 2025-03-11 RX ORDER — ALBUTEROL SULFATE 0.83 MG/ML
2.5 SOLUTION RESPIRATORY (INHALATION)
OUTPATIENT
Start: 2025-06-03

## 2025-03-11 RX ORDER — HEPARIN SODIUM (PORCINE) LOCK FLUSH IV SOLN 100 UNIT/ML 100 UNIT/ML
5 SOLUTION INTRAVENOUS
OUTPATIENT
Start: 2025-05-13

## 2025-03-11 RX ORDER — DIPHENHYDRAMINE HYDROCHLORIDE 50 MG/ML
25 INJECTION, SOLUTION INTRAMUSCULAR; INTRAVENOUS
Start: 2025-06-03

## 2025-03-11 RX ORDER — ALBUTEROL SULFATE 90 UG/1
1-2 INHALANT RESPIRATORY (INHALATION)
Start: 2025-06-24

## 2025-03-11 RX ORDER — HEPARIN SODIUM,PORCINE 10 UNIT/ML
5-20 VIAL (ML) INTRAVENOUS DAILY PRN
OUTPATIENT
Start: 2025-07-15

## 2025-03-11 RX ORDER — DIPHENHYDRAMINE HCL 25 MG
50 CAPSULE ORAL
Start: 2025-06-24

## 2025-03-11 RX ORDER — DIPHENHYDRAMINE HYDROCHLORIDE 50 MG/ML
50 INJECTION, SOLUTION INTRAMUSCULAR; INTRAVENOUS
Start: 2025-05-13

## 2025-03-11 RX ORDER — MEPERIDINE HYDROCHLORIDE 25 MG/ML
25 INJECTION INTRAMUSCULAR; INTRAVENOUS; SUBCUTANEOUS
OUTPATIENT
Start: 2025-06-03

## 2025-03-11 RX ORDER — ONDANSETRON 2 MG/ML
8 INJECTION INTRAMUSCULAR; INTRAVENOUS ONCE
OUTPATIENT
Start: 2025-07-15

## 2025-03-11 RX ORDER — HEPARIN SODIUM,PORCINE 10 UNIT/ML
5-20 VIAL (ML) INTRAVENOUS DAILY PRN
OUTPATIENT
Start: 2025-05-13

## 2025-03-11 RX ORDER — ACETAMINOPHEN 325 MG/1
650 TABLET ORAL
OUTPATIENT
Start: 2025-06-03

## 2025-03-11 RX ORDER — ONDANSETRON 2 MG/ML
8 INJECTION INTRAMUSCULAR; INTRAVENOUS ONCE
OUTPATIENT
Start: 2025-06-03

## 2025-03-11 RX ORDER — DIPHENHYDRAMINE HCL 25 MG
50 CAPSULE ORAL
Start: 2025-06-03

## 2025-03-11 RX ORDER — ONDANSETRON 2 MG/ML
8 INJECTION INTRAMUSCULAR; INTRAVENOUS ONCE
Status: COMPLETED | OUTPATIENT
Start: 2025-03-11 | End: 2025-03-11

## 2025-03-11 RX ORDER — HEPARIN SODIUM (PORCINE) LOCK FLUSH IV SOLN 100 UNIT/ML 100 UNIT/ML
5 SOLUTION INTRAVENOUS
Status: DISCONTINUED | OUTPATIENT
Start: 2025-03-11 | End: 2025-03-11 | Stop reason: HOSPADM

## 2025-03-11 RX ORDER — DIPHENHYDRAMINE HCL 25 MG
50 CAPSULE ORAL
Start: 2025-07-15

## 2025-03-11 RX ORDER — ALBUTEROL SULFATE 90 UG/1
1-2 INHALANT RESPIRATORY (INHALATION)
Start: 2025-06-03

## 2025-03-11 RX ORDER — ALBUTEROL SULFATE 0.83 MG/ML
2.5 SOLUTION RESPIRATORY (INHALATION)
OUTPATIENT
Start: 2025-06-24

## 2025-03-11 RX ORDER — ACETAMINOPHEN 325 MG/1
650 TABLET ORAL
OUTPATIENT
Start: 2025-07-15

## 2025-03-11 RX ORDER — DIPHENHYDRAMINE HYDROCHLORIDE 50 MG/ML
50 INJECTION, SOLUTION INTRAMUSCULAR; INTRAVENOUS
Start: 2025-06-03

## 2025-03-11 RX ORDER — EPINEPHRINE 1 MG/ML
0.3 INJECTION, SOLUTION INTRAMUSCULAR; SUBCUTANEOUS EVERY 5 MIN PRN
OUTPATIENT
Start: 2025-06-03

## 2025-03-11 RX ORDER — METHYLPREDNISOLONE SODIUM SUCCINATE 40 MG/ML
40 INJECTION INTRAMUSCULAR; INTRAVENOUS
Start: 2025-06-03

## 2025-03-11 RX ORDER — HEPARIN SODIUM (PORCINE) LOCK FLUSH IV SOLN 100 UNIT/ML 100 UNIT/ML
5 SOLUTION INTRAVENOUS
OUTPATIENT
Start: 2025-07-15

## 2025-03-11 RX ORDER — HEPARIN SODIUM,PORCINE 10 UNIT/ML
5-20 VIAL (ML) INTRAVENOUS DAILY PRN
OUTPATIENT
Start: 2025-06-03

## 2025-03-11 RX ORDER — ALBUTEROL SULFATE 0.83 MG/ML
2.5 SOLUTION RESPIRATORY (INHALATION)
OUTPATIENT
Start: 2025-05-13

## 2025-03-11 RX ORDER — DIPHENHYDRAMINE HYDROCHLORIDE 50 MG/ML
25 INJECTION, SOLUTION INTRAMUSCULAR; INTRAVENOUS
Start: 2025-07-15

## 2025-03-11 RX ORDER — MEPERIDINE HYDROCHLORIDE 25 MG/ML
25 INJECTION INTRAMUSCULAR; INTRAVENOUS; SUBCUTANEOUS
OUTPATIENT
Start: 2025-07-15

## 2025-03-11 RX ORDER — ACETAMINOPHEN 325 MG/1
650 TABLET ORAL
OUTPATIENT
Start: 2025-05-13

## 2025-03-11 RX ADMIN — SODIUM CHLORIDE 250 ML: 0.9 INJECTION, SOLUTION INTRAVENOUS at 09:02

## 2025-03-11 RX ADMIN — ONDANSETRON 8 MG: 2 INJECTION INTRAMUSCULAR; INTRAVENOUS at 08:46

## 2025-03-11 RX ADMIN — SODIUM CHLORIDE 380 MG: 9 INJECTION, SOLUTION INTRAVENOUS at 08:59

## 2025-03-11 RX ADMIN — Medication 5 ML: at 09:02

## 2025-03-11 ASSESSMENT — PAIN SCALES - GENERAL: PAINLEVEL_OUTOF10: NO PAIN (0)

## 2025-03-11 NOTE — LETTER
3/11/2025      Jenni BOWENS Sallykierra  [address]  [place] [address]      Dear Colleague,    Thank you for referring your patient, Jenni Torres, to the [facility]. Please see a copy of my visit note below.    Oncology progress note    Name: Jenni Torres  : 1970  MRN: [id number]    CC: Breast Cancer    HPI: Jenni Torres is a 53 year old female with a right pT1cN0 hormone receptor positive, HER2 positive breast cancer s/p lumpectomy s/p adjuvant Taxol/Herceptin currently on maintenance Herceptin and tamoxifen here for follow up.    MRI scheduled 3/25/25.  Tolerating tamoxifen well.  No hot flashes. No breast masses.  No menses. Small stitch right breast incision     History  24: right lumpectomy 1.3cm positive DCIS margins. 0/3 LN   24 re-excision: negative margins  24: week 1 Taxol/Herceptin  24: JORGITO  24: week 10 Taxol/Herceptin  10/11/24: week 12 Taxol/Herceptin  Negative genetics  Nov-Dec 2024: radiation  2025: tamoxifen    Review of systems: All other systems reviewed and are negative except for what is described in the history of present illness.      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  Last seizure  before starting medicine       Acquired hypothyroidism 2006     Priority: Medium     Allergic rhinitis  04/04/2006     Priority: Medium     Pain in joint 04/04/2006     Priority: Medium     Premenstrual tension syndrome 04/04/2006     Priority: Medium     Past Medical History:   Diagnosis Date     Hypothyroidism      Kidney stone 11/2018     Seizures (H)        Medications:   Current Outpatient Medications:      levETIRAcetam (KEPPRA) 500 MG tablet, Take 1 tablet (500 mg) by mouth daily, Disp: 90 tablet, Rfl: 3     levothyroxine (SYNTHROID/LEVOTHROID) 137 MCG tablet, Take 1 tablet by mouth daily., Disp: 90 tablet, Rfl: 1     lidocaine-prilocaine (EMLA) 2.5-2.5 % external cream, Apply topically as needed for other (apply to port site 30 minutes prior to arrival at infusion center), Disp: 30 g, Rfl: 1     Multiple Vitamin (ONE-A-DAY ESSENTIAL) TABS, Take 1 tablet by mouth daily, Disp: , Rfl:      prochlorperazine (COMPAZINE) 10 MG tablet, Take 1 tablet (10 mg) by mouth every 6 hours as needed for nausea or vomiting, Disp: 30 tablet, Rfl: 2     sertraline (ZOLOFT) 50 MG tablet, Take 1 tablet by mouth daily., Disp: 90 tablet, Rfl: 3     tamoxifen (NOLVADEX) 20 MG tablet, Take 1 tablet (20 mg) by mouth daily., Disp: 90 tablet, Rfl: 3     valACYclovir (VALTREX) 1000 mg tablet, Take 2 tablets (2,000 mg) by mouth 2 times daily, Disp: 4 tablet, Rfl: 3  No current facility-administered medications for this visit.    Facility-Administered Medications Ordered in Other Visits:      heparin lock flush 100 unit/mL injection 5 mL, 5 mL, Intracatheter, Once PRN, Christiana Tomas MD     ondansetron (ZOFRAN) injection 8 mg, 8 mg, Intravenous, Once, Christiana Tomas MD     sodium chloride (PF) 0.9% PF flush 3-20 mL, 3-20 mL, Intracatheter, q1 min prn, Christiana Tomas MD     sodium chloride 0.9% BOLUS 250 mL, 250 mL, Intravenous, Once, Christiana Tomas MD     trastuzumab-qyyp (TRAZIMERA) 380 mg in sodium chloride 0.9 % 293.1 mL infusion, 6 mg/kg (Treatment Plan Recorded), Intravenous, Once, Christiana Tomas,  MD    Allergies: No Known Allergies    Family history:  Family History   Problem Relation Age of Onset     Hypertension Mother      Hyperlipidemia Mother      Obesity Mother      Hypertension Father      Hyperlipidemia Father      Asthma Maternal Grandmother      Obesity Maternal Grandmother      Prostate Cancer Paternal Grandfather      Breast Cancer Paternal Grandmother      Thyroid Disease Paternal Grandmother        Physical exam:  Vitals:/63   Pulse 58   Temp 97.8  F (36.6  C) (Temporal)   Resp 16   Wt 64.9 kg (143 lb)   SpO2 98%   BMI 22.47 kg/m    GENERAL: No acute distress, pleasant, PS 0  EYES: Sclera anicteric  NECK: Supple  LYMPH NODES: No cervical, supraclavicular lymphadenopathy  CARDIOVASCULAR: Regular rate and rhythm  LUNGS: Clear to auscultation bilaterally  GASTROINTESTINAL: Soft, nontender, nondistended.  No palpable hepatosplenomegaly  EXTREMITIES: No edema  SKIN: No rashes or petechiae, port clear  BACK: No midline tenderness  NEURO: Normal gait  BREASTS: No dominant masses palpated bilaterally.  Right breast incision with what appears to be a stitch. No axillary lymphadenopathy bilaterally.    Labs:  Lab Results   Component Value Date    WBC 4.6 01/28/2025    HGB 12.3 01/28/2025    HCT 37.8 01/28/2025     01/28/2025     01/28/2025    POTASSIUM 4.4 01/28/2025    CHLORIDE 106 01/28/2025    CO2 25 01/28/2025    BUN 16.9 01/28/2025    CR 0.96 (H) 01/28/2025     (H) 01/28/2025    SED 8 11/26/2023    AST 19 01/28/2025    ALT 9 01/28/2025    ALKPHOS 44 01/28/2025    BILITOTAL 0.3 01/28/2025      Radiology  Echo 10/24/24  1. The left ventricle is normal in structure, function and size. The visual ejection fraction is estimated at 60%. Global peak LV longitudinal strain is averaged at -26%. This is within reported normal limits (normal <-18%).   2. The right ventricle is normal in structure, function and size.  3. No valve disease.     Assessment/plan:   Jenni Torres is  a 53 year old female with a right pT1cN0 hormone receptor positive, HER2 positive breast cancer s/p lumpectomy s/p adjuvant Taxol/Herceptin currently on maintenance Herceptin and Tamoxifen here for follow up.    1.  Breast Cancer: continue adjuvant Herceptin q3 weeks to finish a year of therapy (last due in July 2025). Plan to draw labs in July before port removed. Echo ordered for April 2025. She is tolerating tamoxifen well. Breast MRI scheduled for March 2025.  Mammogram to be done late summer 2025.  Will check hormone levels in Fall 2025 if remains amenorrheic to determine if she is in menopause with then transition to AI (will also need baseline DEXA). We discussed plans for monitoring and signs and symptoms to monitor for.    All of the patient's questions were answered.    Return to the office in 9 weeks or sooner as needed    Thank you for allowing me to participate in the care of your patient.  Please call with any questions.    I personally reviewed the recent studies and I explained the rationale of the tests ordered today to the patient.     The longitudinal plan of care for the diagnosis(es)/condition(s) as documented were addressed during this visit. Due to the added complexity in care, I will continue to support Jenni in the subsequent management and with ongoing continuity of care.      Orders Placed This Encounter   Procedures     COMPREHENSIVE METABOLIC PANEL     Vitamin D Deficiency     Echocardiogram Limited     CBC with Platelets & Differential          Again, thank you for allowing me to participate in the care of your patient.        Sincerely,        Christiana Tomas MD    Electronically signed

## 2025-03-11 NOTE — NURSING NOTE
"Oncology Rooming Note    March 11, 2025 8:02 AM   Jenni Torres is a 54 year old female who presents for:    Chief Complaint   Patient presents with    Oncology Clinic Visit     Initial Vitals: /63   Pulse 58   Temp 97.8  F (36.6  C) (Temporal)   Resp 16   Wt 64.9 kg (143 lb)   SpO2 98%   BMI 22.47 kg/m   Estimated body mass index is 22.47 kg/m  as calculated from the following:    Height as of 10/11/24: 1.699 m (5' 6.89\").    Weight as of this encounter: 64.9 kg (143 lb). Body surface area is 1.75 meters squared.  No Pain (0) Comment: Data Unavailable   No LMP recorded.  Allergies reviewed: Yes  Medications reviewed: Yes    Medications: Medication refills not needed today.  Pharmacy name entered into EPIC: [facility] - [place], [place] - [address]    Frailty Screening:   Is the patient here for a new oncology consult visit in cancer care? 2. No    PHQ9:  Did this patient require a PHQ9?: No      Clinical concerns: F/U       Aniya Toro CMA              "

## 2025-03-11 NOTE — PROGRESS NOTES
Infusion Nursing Note:    Jenni Torres presents today for D1C20 Trazimera.      Pre-Meds: 8mg IV zofran    Patient seen by provider today: Yes, Dr. Quinn     present during visit today: Not Applicable.     Note: Needs an Echo in April.        Intravenous Access:  Implanted Port.     Treatment Conditions:  Results reviewed, labs MET treatment parameters, ok to proceed with treatment.    ECHO/MUGA completed 1/24/25  EF 64%.        Post Infusion Assessment:  Patient tolerated infusion without incident.  Blood return noted pre and post infusion.  Site patent and intact, free from redness, edema or discomfort.  No evidence of extravasations.  Access discontinued per protocol.         Discharge Plan:   AVS to patient via MYCHART.  Patient will return 4/1/2025 for next appointment.   Patient discharged in stable condition accompanied by: self.  Departure Mode: Ambulatory.    Marivel Paris RN

## 2025-04-01 ENCOUNTER — INFUSION THERAPY VISIT (OUTPATIENT)
Dept: INFUSION THERAPY | Facility: CLINIC | Age: 55
End: 2025-04-01
Attending: INTERNAL MEDICINE
Payer: COMMERCIAL

## 2025-04-01 VITALS
WEIGHT: 144.1 LBS | DIASTOLIC BLOOD PRESSURE: 63 MMHG | SYSTOLIC BLOOD PRESSURE: 104 MMHG | RESPIRATION RATE: 16 BRPM | OXYGEN SATURATION: 97 % | HEART RATE: 60 BPM | TEMPERATURE: 98.3 F | BODY MASS INDEX: 22.64 KG/M2

## 2025-04-01 DIAGNOSIS — C50.411 MALIGNANT NEOPLASM OF UPPER-OUTER QUADRANT OF RIGHT BREAST IN FEMALE, ESTROGEN RECEPTOR POSITIVE (H): Primary | ICD-10-CM

## 2025-04-01 DIAGNOSIS — Z17.0 MALIGNANT NEOPLASM OF UPPER-OUTER QUADRANT OF RIGHT BREAST IN FEMALE, ESTROGEN RECEPTOR POSITIVE (H): Primary | ICD-10-CM

## 2025-04-01 PROCEDURE — 96413 CHEMO IV INFUSION 1 HR: CPT

## 2025-04-01 PROCEDURE — 96375 TX/PRO/DX INJ NEW DRUG ADDON: CPT

## 2025-04-01 PROCEDURE — 250N000011 HC RX IP 250 OP 636: Performed by: INTERNAL MEDICINE

## 2025-04-01 PROCEDURE — 258N000003 HC RX IP 258 OP 636: Performed by: INTERNAL MEDICINE

## 2025-04-01 RX ORDER — HEPARIN SODIUM (PORCINE) LOCK FLUSH IV SOLN 100 UNIT/ML 100 UNIT/ML
5 SOLUTION INTRAVENOUS
Status: DISCONTINUED | OUTPATIENT
Start: 2025-04-01 | End: 2025-04-01 | Stop reason: HOSPADM

## 2025-04-01 RX ORDER — ONDANSETRON 2 MG/ML
8 INJECTION INTRAMUSCULAR; INTRAVENOUS ONCE
Status: COMPLETED | OUTPATIENT
Start: 2025-04-01 | End: 2025-04-01

## 2025-04-01 RX ADMIN — SODIUM CHLORIDE 380 MG: 0.9 INJECTION, SOLUTION INTRAVENOUS at 08:33

## 2025-04-01 RX ADMIN — ONDANSETRON 8 MG: 2 INJECTION INTRAMUSCULAR; INTRAVENOUS at 08:29

## 2025-04-01 RX ADMIN — Medication 5 ML: at 09:05

## 2025-04-01 NOTE — PROGRESS NOTES
Infusion Nursing Note:  Jenni Torres presents today for D1C21 Trazimera.    Patient seen by provider today: No   present during visit today: Not Applicable.    Note: Jenni arrives today feeling well but expresses concern that insurance has denied coverage for her upcoming MRI. Inbasket sent to Dr. Tomas and RNROZ AYALA Copy of letter from insurance provided to Jayna.      Intravenous Access:  Implanted Port.    Treatment Conditions:  Echo last on 1/24/25 EF 64%. Next due on 4/11/25 (scheduled).      Post Infusion Assessment:  Patient tolerated infusion without incident.  Blood return noted pre and post infusion.  Site patent and intact, free from redness, edema or discomfort.  No evidence of extravasations.  Access discontinued per protocol.       Discharge Plan:   Discharge instructions reviewed with: Patient.  Patient and/or family verbalized understanding of discharge instructions and all questions answered.  AVS to patient via [email or web address].  Patient will return 4/22/25 for next appointment.   Patient discharged in stable condition accompanied by: self.  Departure Mode: Ambulatory.      Chelsey Adams RN

## 2025-04-09 ENCOUNTER — HOSPITAL ENCOUNTER (OUTPATIENT)
Dept: MRI IMAGING | Facility: CLINIC | Age: 55
Discharge: HOME | End: 2025-04-09
Attending: PHYSICIAN ASSISTANT
Payer: COMMERCIAL

## 2025-04-09 ENCOUNTER — PATIENT OUTREACH (OUTPATIENT)
Dept: CARE COORDINATION | Facility: CLINIC | Age: 55
End: 2025-04-09
Payer: COMMERCIAL

## 2025-04-09 DIAGNOSIS — C50.411 MALIGNANT NEOPLASM OF UPPER-OUTER QUADRANT OF RIGHT BREAST IN FEMALE, ESTROGEN RECEPTOR POSITIVE (H): ICD-10-CM

## 2025-04-09 DIAGNOSIS — Z17.0 MALIGNANT NEOPLASM OF UPPER-OUTER QUADRANT OF RIGHT BREAST IN FEMALE, ESTROGEN RECEPTOR POSITIVE (H): ICD-10-CM

## 2025-04-09 PROCEDURE — 77049 MRI BREAST C-+ W/CAD BI: CPT

## 2025-04-09 PROCEDURE — 255N000002 HC RX 255 OP 636: Performed by: PHYSICIAN ASSISTANT

## 2025-04-09 PROCEDURE — A9585 GADOBUTROL INJECTION: HCPCS | Performed by: PHYSICIAN ASSISTANT

## 2025-04-09 RX ORDER — GADOBUTROL 604.72 MG/ML
6.5 INJECTION INTRAVENOUS ONCE
Status: COMPLETED | OUTPATIENT
Start: 2025-04-09 | End: 2025-04-09

## 2025-04-09 RX ADMIN — GADOBUTROL 6.5 ML: 604.72 INJECTION INTRAVENOUS at 11:18

## 2025-04-11 ENCOUNTER — HOSPITAL ENCOUNTER (OUTPATIENT)
Dept: CARDIOLOGY | Facility: CLINIC | Age: 55
Discharge: HOME | End: 2025-04-11
Attending: INTERNAL MEDICINE | Admitting: INTERNAL MEDICINE
Payer: COMMERCIAL

## 2025-04-11 DIAGNOSIS — Z17.0 MALIGNANT NEOPLASM OF UPPER-OUTER QUADRANT OF RIGHT BREAST IN FEMALE, ESTROGEN RECEPTOR POSITIVE (H): ICD-10-CM

## 2025-04-11 DIAGNOSIS — C50.411 MALIGNANT NEOPLASM OF UPPER-OUTER QUADRANT OF RIGHT BREAST IN FEMALE, ESTROGEN RECEPTOR POSITIVE (H): ICD-10-CM

## 2025-04-11 LAB — LVEF ECHO: NORMAL

## 2025-04-11 PROCEDURE — 93325 DOPPLER ECHO COLOR FLOW MAPG: CPT | Mod: 26 | Performed by: INTERNAL MEDICINE

## 2025-04-11 PROCEDURE — 93321 DOPPLER ECHO F-UP/LMTD STD: CPT | Mod: 26 | Performed by: INTERNAL MEDICINE

## 2025-04-11 PROCEDURE — 93325 DOPPLER ECHO COLOR FLOW MAPG: CPT

## 2025-04-11 PROCEDURE — 93308 TTE F-UP OR LMTD: CPT | Mod: 26 | Performed by: INTERNAL MEDICINE

## 2025-04-19 DIAGNOSIS — R56.9 PARTIAL SEIZURES (H): ICD-10-CM

## 2025-04-21 ENCOUNTER — PATIENT OUTREACH (OUTPATIENT)
Dept: CARE COORDINATION | Facility: CLINIC | Age: 55
End: 2025-04-21
Payer: COMMERCIAL

## 2025-04-21 RX ORDER — LEVETIRACETAM 500 MG/1
500 TABLET ORAL DAILY
Qty: 90 TABLET | Refills: 0 | Status: SHIPPED | OUTPATIENT
Start: 2025-04-21

## 2025-04-21 NOTE — TELEPHONE ENCOUNTER
Karissa 90 day refill provided today - pt is due for annual physica appt that needs to be scheduled prior to further refills.  Please call to inform and schedule.

## 2025-04-22 ENCOUNTER — INFUSION THERAPY VISIT (OUTPATIENT)
Dept: INFUSION THERAPY | Facility: CLINIC | Age: 55
End: 2025-04-22
Attending: PHYSICIAN ASSISTANT
Payer: COMMERCIAL

## 2025-04-22 VITALS
DIASTOLIC BLOOD PRESSURE: 70 MMHG | RESPIRATION RATE: 18 BRPM | HEART RATE: 60 BPM | TEMPERATURE: 98.7 F | OXYGEN SATURATION: 97 % | SYSTOLIC BLOOD PRESSURE: 104 MMHG | WEIGHT: 144.9 LBS | BODY MASS INDEX: 22.77 KG/M2

## 2025-04-22 DIAGNOSIS — C50.411 MALIGNANT NEOPLASM OF UPPER-OUTER QUADRANT OF RIGHT BREAST IN FEMALE, ESTROGEN RECEPTOR POSITIVE (H): Primary | ICD-10-CM

## 2025-04-22 DIAGNOSIS — Z17.0 MALIGNANT NEOPLASM OF UPPER-OUTER QUADRANT OF RIGHT BREAST IN FEMALE, ESTROGEN RECEPTOR POSITIVE (H): Primary | ICD-10-CM

## 2025-04-22 PROCEDURE — 96413 CHEMO IV INFUSION 1 HR: CPT

## 2025-04-22 PROCEDURE — 258N000003 HC RX IP 258 OP 636: Performed by: INTERNAL MEDICINE

## 2025-04-22 PROCEDURE — 96375 TX/PRO/DX INJ NEW DRUG ADDON: CPT

## 2025-04-22 PROCEDURE — 250N000011 HC RX IP 250 OP 636: Performed by: INTERNAL MEDICINE

## 2025-04-22 RX ORDER — HEPARIN SODIUM (PORCINE) LOCK FLUSH IV SOLN 100 UNIT/ML 100 UNIT/ML
5 SOLUTION INTRAVENOUS
Status: DISCONTINUED | OUTPATIENT
Start: 2025-04-22 | End: 2025-04-22 | Stop reason: HOSPADM

## 2025-04-22 RX ORDER — ONDANSETRON 2 MG/ML
8 INJECTION INTRAMUSCULAR; INTRAVENOUS ONCE
Status: COMPLETED | OUTPATIENT
Start: 2025-04-22 | End: 2025-04-22

## 2025-04-22 RX ADMIN — Medication 5 ML: at 10:07

## 2025-04-22 RX ADMIN — SODIUM CHLORIDE 380 MG: 0.9 INJECTION, SOLUTION INTRAVENOUS at 09:35

## 2025-04-22 RX ADMIN — ONDANSETRON 8 MG: 2 INJECTION INTRAMUSCULAR; INTRAVENOUS at 09:30

## 2025-04-22 NOTE — PROGRESS NOTES
Infusion Nursing Note:  Jenni Torres presents today for C22D1 Trazimera.    Patient seen by provider today: No   present during visit today: Not Applicable.    Note: N/A.      Intravenous Access:  Implanted Port.    Treatment Conditions:  ECHO/MUGA completed 4/11/25  EF 65%%.      Post Infusion Assessment:  Patient tolerated infusion without incident.  Blood return noted pre and post infusion.  Site patent and intact, free from redness, edema or discomfort.  No evidence of extravasations.  Access discontinued per protocol.       Discharge Plan:   Discharge instructions reviewed with: Patient.  Patient and/or family verbalized understanding of discharge instructions and all questions answered.  AVS to patient via [employer].  Patient will return 5/13/25 for next appointment.   Patient discharged in stable condition accompanied by: self.  Departure Mode: Ambulatory.      Sharmaine Flores RN

## 2025-04-22 NOTE — TELEPHONE ENCOUNTER
Sent MyChart x1 attempt.      Vicky NUNEZ, - [facility]  [facility]

## 2025-04-23 NOTE — TELEPHONE ENCOUNTER
Pt read Gongpingjia message and scheduled annual preventative. Closing encounter.     Dulce Nicholas MA 1:33 PM 4/23/2025

## 2025-05-12 NOTE — PROGRESS NOTES
Oncology/Hematology Visit Note    May 13, 2025    Reason for visit: Follow up breast cancer    Oncology HPI: Jenni Torres is a 54 year old female with a right pT1cN0 hormone receptor positive, HER2 positive breast cancer s/p lumpectomy s/p adjuvant Taxol/Herceptin currently on maintenance Herceptin and tamoxifen.     History  6/24/24: right lumpectomy 1.3cm positive DCIS margins. 0/3 LN   7/11/24 re-excision: negative margins  7/24/24: week 1 Taxol/Herceptin  9/24/24: JORGITO  9/26/24: week 10 Taxol/Herceptin  10/11/24: week 12 Taxol/Herceptin  Negative genetics  Nov-Dec 2024: radiation  Jan 2025: tamoxifen  April 2024: Breast MRI neg    She is here today for Herceptin and follow-up.    Interval History: Jenni is here today unaccompanied.  She continues to tolerate Herceptin and tamoxifen pretty well.  No hot flashes, which is great.  Infusions are going well.  She is hoping to get her port out shortly after her last Herceptin infusion.  No fever, chills, vomiting, diarrhea or bleeding.    Review of Systems: See interval hx. Denies fevers, chills, HA, dizziness, CP, SOB, abdominal pain, NV, changes in urination, bruising, rash.     PMHx and Social Hx reviewed per EPIC.      Medications:  Current Outpatient Medications   Medication Sig Dispense Refill    levETIRAcetam (KEPPRA) 500 MG tablet Take 1 tablet by mouth daily. 90 tablet 0    levothyroxine (SYNTHROID/LEVOTHROID) 137 MCG tablet Take 1 tablet by mouth daily. 90 tablet 1    lidocaine-prilocaine (EMLA) 2.5-2.5 % external cream Apply topically as needed for other (apply to port site 30 minutes prior to arrival at infusion center) 30 g 1    Multiple Vitamin (ONE-A-DAY ESSENTIAL) TABS Take 1 tablet by mouth daily      prochlorperazine (COMPAZINE) 10 MG tablet Take 1 tablet (10 mg) by mouth every 6 hours as needed for nausea or vomiting 30 tablet 2    sertraline (ZOLOFT) 50 MG tablet Take 1 tablet by mouth daily. 90 tablet 3    tamoxifen (NOLVADEX) 20 MG tablet Take  1 tablet (20 mg) by mouth daily. 90 tablet 3    valACYclovir (VALTREX) 1000 mg tablet Take 2 tablets (2,000 mg) by mouth 2 times daily 4 tablet 3       No Known Allergies      EXAM:    BP 93/59   Pulse 59   Temp 98.2  F (36.8  C) (Oral)   Resp 16   Wt 64.5 kg (142 lb 1.6 oz)   SpO2 96%   BMI 22.33 kg/m      GENERAL:  Female, in no acute distress.  Alert and oriented x3. Well groomed.   HEENT:  Normocephalic, atraumatic. No conjunctival injection or eye swelling.   LUNGS:  Nonlabored breathing, no cough or audible wheezing, able to speak full sentences.  MSK: Full ROM UE.    SKIN: No rash on exposed skin.   NEURO: CN grossly intact, speech normal  PSYCH: Mentation appears normal, insight and judgement intact    Labs:     05/13/25 09:33   Sodium 140   Potassium 4.0   Chloride 106   Carbon Dioxide (CO2) 26   Urea Nitrogen 17.8   Creatinine 0.89   GFR Estimate 77   Calcium 8.7 (L)   Anion Gap 8   Albumin 4.0   Protein Total 6.3 (L)   Alkaline Phosphatase 47   ALT 12   AST 20   Bilirubin Total 0.4   Glucose 95   WBC 5.2   Hemoglobin 12.4   Hematocrit 36.7   Platelet Count 178   RBC Count 3.94   MCV 93   MCH 31.5   MCHC 33.8   RDW 12.1   % Neutrophils 68   % Lymphocytes 22   % Monocytes 7   % Eosinophils 1   % Basophils 1   % Immature Granulocytes 0   NRBC/W 0   Absolute Neutrophil 3.6   Absolute Lymphocytes 1.2   Absolute Monocytes 0.4   Absolute Eosinophils 0.1   Absolute Basophils 0.0   Absolute Immature Granulocytes 0.0   Absolute NRBCs 0.0     Imaging: n/a    Impression/Plan: Jenni Torres is a 53 year old female with right-sided ER/MI positive, HER2 positive breast cancer s/p lumpectomy, adjuvant Taxol/Herceptin x 12, radiation therapy and now on maintenance Herceptin.    Breast cancer: Invasive ductal carcinoma, grade 2, 1.3 cm with associated DCIS.  All margins were negative on the invasive tumor, but she did have positive margin on the DCIS.  All 3 sentinel lymph nodes negative.  She is now s/p  right-sided lumpectomy, Taxol/Herceptin x 12 cycles, radiation therapy and she is now on maintenance Herceptin, tolerating this really well.  She has now established care with Dr. Tomas with Dr. Mariee's departure.  -Okay to proceed with Herceptin today, continue every 3 weeks  -Continue tamoxifen  -Spoke to Dr. Tomas, ovarian suppressant not indicated given the low risk and we will monitor hormone levels in the fall, if menopausal, likely changed to AI therapy  -Echo 4/11/25 stable, due July 2025, ordered  -Thom in July with Herceptin    Abdominal cramping: Initially occurring after treatment, but last week, it occurred during infusion.  Atropine was added to the plan and now resolved.     Insomnia: Improved with melatonin.      Chart documentation with Dragon Voice recognition Software. Although reviewed after completion, some words and grammatical errors may remain.    30 minutes spent on the date of the encounter doing chart review, review of test results, interpretation of tests, patient visit, and documentation     The longitudinal plan of care for the diagnosis(es)/condition(s) as documented were addressed during this visit. Due to the added complexity in care, I will continue to support Jenni in the subsequent management and with ongoing continuity of care.    Love Coates PA-C  Hematology/Oncology  [facility]

## 2025-05-13 ENCOUNTER — ONCOLOGY VISIT (OUTPATIENT)
Dept: ONCOLOGY | Facility: CLINIC | Age: 55
End: 2025-05-13
Attending: INTERNAL MEDICINE
Payer: COMMERCIAL

## 2025-05-13 ENCOUNTER — INFUSION THERAPY VISIT (OUTPATIENT)
Dept: INFUSION THERAPY | Facility: CLINIC | Age: 55
End: 2025-05-13
Attending: PHYSICIAN ASSISTANT
Payer: COMMERCIAL

## 2025-05-13 VITALS
BODY MASS INDEX: 22.33 KG/M2 | WEIGHT: 142.1 LBS | RESPIRATION RATE: 16 BRPM | DIASTOLIC BLOOD PRESSURE: 59 MMHG | HEART RATE: 59 BPM | TEMPERATURE: 98.2 F | SYSTOLIC BLOOD PRESSURE: 93 MMHG | OXYGEN SATURATION: 96 %

## 2025-05-13 DIAGNOSIS — C50.411 MALIGNANT NEOPLASM OF UPPER-OUTER QUADRANT OF RIGHT BREAST IN FEMALE, ESTROGEN RECEPTOR POSITIVE (H): Primary | ICD-10-CM

## 2025-05-13 DIAGNOSIS — Z17.0 MALIGNANT NEOPLASM OF UPPER-OUTER QUADRANT OF RIGHT BREAST IN FEMALE, ESTROGEN RECEPTOR POSITIVE (H): Primary | ICD-10-CM

## 2025-05-13 DIAGNOSIS — I42.7 CARDIOMYOPATHY DUE TO DRUG AND EXTERNAL AGENT: ICD-10-CM

## 2025-05-13 DIAGNOSIS — E03.9 ACQUIRED HYPOTHYROIDISM: ICD-10-CM

## 2025-05-13 LAB
ALBUMIN SERPL BCG-MCNC: 4 G/DL (ref 3.5–5.2)
ALP SERPL-CCNC: 47 U/L (ref 40–150)
ALT SERPL W P-5'-P-CCNC: 12 U/L (ref 0–50)
ANION GAP SERPL CALCULATED.3IONS-SCNC: 8 MMOL/L (ref 7–15)
AST SERPL W P-5'-P-CCNC: 20 U/L (ref 0–45)
BASOPHILS # BLD AUTO: 0 10E3/UL (ref 0–0.2)
BASOPHILS NFR BLD AUTO: 1 %
BILIRUB SERPL-MCNC: 0.4 MG/DL
BUN SERPL-MCNC: 17.8 MG/DL (ref 6–20)
CALCIUM SERPL-MCNC: 8.7 MG/DL (ref 8.8–10.4)
CHLORIDE SERPL-SCNC: 106 MMOL/L (ref 98–107)
CREAT SERPL-MCNC: 0.89 MG/DL (ref 0.51–0.95)
EGFRCR SERPLBLD CKD-EPI 2021: 77 ML/MIN/1.73M2
EOSINOPHIL # BLD AUTO: 0.1 10E3/UL (ref 0–0.7)
EOSINOPHIL NFR BLD AUTO: 1 %
ERYTHROCYTE [DISTWIDTH] IN BLOOD BY AUTOMATED COUNT: 12.1 % (ref 10–15)
GLUCOSE SERPL-MCNC: 95 MG/DL (ref 70–99)
HCO3 SERPL-SCNC: 26 MMOL/L (ref 22–29)
HCT VFR BLD AUTO: 36.7 % (ref 35–47)
HGB BLD-MCNC: 12.4 G/DL (ref 11.7–15.7)
IMM GRANULOCYTES # BLD: 0 10E3/UL
IMM GRANULOCYTES NFR BLD: 0 %
LYMPHOCYTES # BLD AUTO: 1.2 10E3/UL (ref 0.8–5.3)
LYMPHOCYTES NFR BLD AUTO: 22 %
MCH RBC QN AUTO: 31.5 PG (ref 26.5–33)
MCHC RBC AUTO-ENTMCNC: 33.8 G/DL (ref 31.5–36.5)
MCV RBC AUTO: 93 FL (ref 78–100)
MONOCYTES # BLD AUTO: 0.4 10E3/UL (ref 0–1.3)
MONOCYTES NFR BLD AUTO: 7 %
NEUTROPHILS # BLD AUTO: 3.6 10E3/UL (ref 1.6–8.3)
NEUTROPHILS NFR BLD AUTO: 68 %
NRBC # BLD AUTO: 0 10E3/UL
NRBC BLD AUTO-RTO: 0 /100
PLATELET # BLD AUTO: 178 10E3/UL (ref 150–450)
POTASSIUM SERPL-SCNC: 4 MMOL/L (ref 3.4–5.3)
PROT SERPL-MCNC: 6.3 G/DL (ref 6.4–8.3)
RBC # BLD AUTO: 3.94 10E6/UL (ref 3.8–5.2)
SODIUM SERPL-SCNC: 140 MMOL/L (ref 135–145)
WBC # BLD AUTO: 5.2 10E3/UL (ref 4–11)

## 2025-05-13 PROCEDURE — 250N000011 HC RX IP 250 OP 636: Performed by: INTERNAL MEDICINE

## 2025-05-13 PROCEDURE — 99214 OFFICE O/P EST MOD 30 MIN: CPT | Mod: 25 | Performed by: PHYSICIAN ASSISTANT

## 2025-05-13 PROCEDURE — 99214 OFFICE O/P EST MOD 30 MIN: CPT | Performed by: PHYSICIAN ASSISTANT

## 2025-05-13 PROCEDURE — 96375 TX/PRO/DX INJ NEW DRUG ADDON: CPT

## 2025-05-13 PROCEDURE — G2211 COMPLEX E/M VISIT ADD ON: HCPCS | Performed by: PHYSICIAN ASSISTANT

## 2025-05-13 PROCEDURE — 96413 CHEMO IV INFUSION 1 HR: CPT

## 2025-05-13 PROCEDURE — 258N000003 HC RX IP 258 OP 636: Performed by: INTERNAL MEDICINE

## 2025-05-13 PROCEDURE — 80053 COMPREHEN METABOLIC PANEL: CPT | Performed by: PHYSICIAN ASSISTANT

## 2025-05-13 PROCEDURE — 36415 COLL VENOUS BLD VENIPUNCTURE: CPT

## 2025-05-13 PROCEDURE — 85004 AUTOMATED DIFF WBC COUNT: CPT | Performed by: PHYSICIAN ASSISTANT

## 2025-05-13 RX ORDER — ONDANSETRON 2 MG/ML
8 INJECTION INTRAMUSCULAR; INTRAVENOUS ONCE
Status: COMPLETED | OUTPATIENT
Start: 2025-05-13 | End: 2025-05-13

## 2025-05-13 RX ORDER — HEPARIN SODIUM (PORCINE) LOCK FLUSH IV SOLN 100 UNIT/ML 100 UNIT/ML
5 SOLUTION INTRAVENOUS
Status: DISCONTINUED | OUTPATIENT
Start: 2025-05-13 | End: 2025-05-13 | Stop reason: HOSPADM

## 2025-05-13 RX ADMIN — ONDANSETRON 8 MG: 2 INJECTION INTRAMUSCULAR; INTRAVENOUS at 09:45

## 2025-05-13 RX ADMIN — SODIUM CHLORIDE 250 ML: 0.9 INJECTION, SOLUTION INTRAVENOUS at 09:45

## 2025-05-13 RX ADMIN — SODIUM CHLORIDE 380 MG: 0.9 INJECTION, SOLUTION INTRAVENOUS at 09:54

## 2025-05-13 RX ADMIN — Medication 5 ML: at 10:26

## 2025-05-13 ASSESSMENT — PAIN SCALES - GENERAL: PAINLEVEL_OUTOF10: NO PAIN (0)

## 2025-05-13 NOTE — PROGRESS NOTES
Infusion Nursing Note:  Jenni Torres presents today for C23D1 Trazimera.    Patient seen by provider today: Yes: RED Rao   present during visit today: Not Applicable.    Note: N/A.      Intravenous Access:  Labs drawn without difficulty.  Implanted Port.    Treatment Conditions:  ECHO/MUGA completed 1/2025  EF 64%.      Post Infusion Assessment:  Patient tolerated infusion without incident.  Blood return noted pre and post infusion.  Site patent and intact, free from redness, edema or discomfort.  No evidence of extravasations.  Access discontinued per protocol.       Discharge Plan:   Discharge instructions reviewed with: Patient.  Patient and/or family verbalized understanding of discharge instructions and all questions answered.  AVS to patient via [employer].  Patient will return 6/3 for next appointment.   Patient discharged in stable condition accompanied by: self.  Departure Mode: Ambulatory.      Palma Shepard RN    
Male

## 2025-05-13 NOTE — LETTER
5/13/2025      Jenni Torres  [facility]  [place] [address]      Dear Colleague,    Thank you for referring your patient, Jenni Torres, to the [facility]. Please see a copy of my visit note below.    Oncology/Hematology Visit Note    May 13, 2025    Reason for visit: Follow up breast cancer    Oncology HPI: Jenni Torres is a 54 year old female with a right pT1cN0 hormone receptor positive, HER2 positive breast cancer s/p lumpectomy s/p adjuvant Taxol/Herceptin currently on maintenance Herceptin and tamoxifen.     History  6/24/24: right lumpectomy 1.3cm positive DCIS margins. 0/3 LN   7/11/24 re-excision: negative margins  7/24/24: week 1 Taxol/Herceptin  9/24/24: JORGITO  9/26/24: week 10 Taxol/Herceptin  10/11/24: week 12 Taxol/Herceptin  Negative genetics  Nov-Dec 2024: radiation  Jan 2025: tamoxifen  April 2024: Breast MRI neg    She is here today for Herceptin and follow-up.    Interval History: Jenni is here today unaccompanied.  She continues to tolerate Herceptin and tamoxifen pretty well.  No hot flashes, which is great.  Infusions are going well.  She is hoping to get her port out shortly after her last Herceptin infusion.  No fever, chills, vomiting, diarrhea or bleeding.    Review of Systems: See interval hx. Denies fevers, chills, HA, dizziness, CP, SOB, abdominal pain, NV, changes in urination, bruising, rash.     PMHx and Social Hx reviewed per EPIC.      Medications:  Current Outpatient Medications   Medication Sig Dispense Refill     levETIRAcetam (KEPPRA) 500 MG tablet Take 1 tablet by mouth daily. 90 tablet 0     levothyroxine (SYNTHROID/LEVOTHROID) 137 MCG tablet Take 1 tablet by mouth daily. 90 tablet 1     lidocaine-prilocaine (EMLA) 2.5-2.5 % external cream Apply topically as needed for other (apply to port site 30 minutes prior to arrival at infusion center) 30 g 1     Multiple Vitamin (ONE-A-DAY ESSENTIAL) TABS Take 1 tablet by mouth daily        prochlorperazine (COMPAZINE) 10 MG tablet Take 1 tablet (10 mg) by mouth every 6 hours as needed for nausea or vomiting 30 tablet 2     sertraline (ZOLOFT) 50 MG tablet Take 1 tablet by mouth daily. 90 tablet 3     tamoxifen (NOLVADEX) 20 MG tablet Take 1 tablet (20 mg) by mouth daily. 90 tablet 3     valACYclovir (VALTREX) 1000 mg tablet Take 2 tablets (2,000 mg) by mouth 2 times daily 4 tablet 3       No Known Allergies      EXAM:    BP 93/59   Pulse 59   Temp 98.2  F (36.8  C) (Oral)   Resp 16   Wt 64.5 kg (142 lb 1.6 oz)   SpO2 96%   BMI 22.33 kg/m      GENERAL:  Female, in no acute distress.  Alert and oriented x3. Well groomed.   HEENT:  Normocephalic, atraumatic. No conjunctival injection or eye swelling.   LUNGS:  Nonlabored breathing, no cough or audible wheezing, able to speak full sentences.  MSK: Full ROM UE.    SKIN: No rash on exposed skin.   NEURO: CN grossly intact, speech normal  PSYCH: Mentation appears normal, insight and judgement intact    Labs:     05/13/25 09:33   Sodium 140   Potassium 4.0   Chloride 106   Carbon Dioxide (CO2) 26   Urea Nitrogen 17.8   Creatinine 0.89   GFR Estimate 77   Calcium 8.7 (L)   Anion Gap 8   Albumin 4.0   Protein Total 6.3 (L)   Alkaline Phosphatase 47   ALT 12   AST 20   Bilirubin Total 0.4   Glucose 95   WBC 5.2   Hemoglobin 12.4   Hematocrit 36.7   Platelet Count 178   RBC Count 3.94   MCV 93   MCH 31.5   MCHC 33.8   RDW 12.1   % Neutrophils 68   % Lymphocytes 22   % Monocytes 7   % Eosinophils 1   % Basophils 1   % Immature Granulocytes 0   NRBC/W 0   Absolute Neutrophil 3.6   Absolute Lymphocytes 1.2   Absolute Monocytes 0.4   Absolute Eosinophils 0.1   Absolute Basophils 0.0   Absolute Immature Granulocytes 0.0   Absolute NRBCs 0.0     Imaging: n/a    Impression/Plan: Jenni Torres is a 53 year old female with right-sided ER/[place] positive, HER2 positive breast cancer s/p lumpectomy, adjuvant Taxol/Herceptin x 12, radiation therapy and now on maintenance  Herceptin.    Breast cancer: Invasive ductal carcinoma, grade 2, 1.3 cm with associated DCIS.  All margins were negative on the invasive tumor, but she did have positive margin on the DCIS.  All 3 sentinel lymph nodes negative.  She is now s/p right-sided lumpectomy, Taxol/Herceptin x 12 cycles, radiation therapy and she is now on maintenance Herceptin, tolerating this really well.  She has now established care with Dr. Tomas with Dr. Mariee's departure.  -Okay to proceed with Herceptin today, continue every 3 weeks  -Continue tamoxifen  -Spoke to Dr. Tomas, ovarian suppressant not indicated given the low risk and we will monitor hormone levels in the fall, if menopausal, likely changed to AI therapy  -Echo 4/11/25 stable, due July 2025, ordered  -Thom in July with Herceptin    Abdominal cramping: Initially occurring after treatment, but last week, it occurred during infusion.  Atropine was added to the plan and now resolved.     Insomnia: Improved with melatonin.      Chart documentation with Dragon Voice recognition Software. Although reviewed after completion, some words and grammatical errors may remain.    30 minutes spent on the date of the encounter doing chart review, review of test results, interpretation of tests, patient visit, and documentation     The longitudinal plan of care for the diagnosis(es)/condition(s) as documented were addressed during this visit. Due to the added complexity in care, I will continue to support Jenni in the subsequent management and with ongoing continuity of care.    Love Coates PA-C  Hematology/Oncology  [facility]                  Again, thank you for allowing me to participate in the care of your patient.        Sincerely,        Love Coates PA-C    Electronically signed

## 2025-05-13 NOTE — NURSING NOTE
"Oncology Rooming Note    May 13, 2025 8:59 AM   Jenni Torres is a 54 year old female who presents for:    Chief Complaint   Patient presents with    Oncology Clinic Visit     Initial Vitals: BP 93/59   Pulse 59   Temp 98.2  F (36.8  C) (Oral)   Resp 16   Wt 64.5 kg (142 lb 1.6 oz)   SpO2 96%   BMI 22.33 kg/m   Estimated body mass index is 22.33 kg/m  as calculated from the following:    Height as of 10/11/24: 1.699 m (5' 6.89\").    Weight as of this encounter: 64.5 kg (142 lb 1.6 oz). Body surface area is 1.74 meters squared.  No Pain (0) Comment: Data Unavailable   No LMP recorded.  Allergies reviewed: Yes  Medications reviewed: Yes    Medications: Medication refills not needed today.  Pharmacy name entered into EPIC: [facility] - [place], [place] - [address]    Frailty Screening:   Is the patient here for a new oncology consult visit in cancer care? 2. No    PHQ9:  Did this patient require a PHQ9?: No      Clinical concerns: follow up        Giovanna Whipple            "

## 2025-05-14 RX ORDER — LEVOTHYROXINE SODIUM 137 UG/1
137 TABLET ORAL DAILY
Qty: 90 TABLET | Refills: 0 | Status: SHIPPED | OUTPATIENT
Start: 2025-05-14

## 2025-05-19 ENCOUNTER — PATIENT OUTREACH (OUTPATIENT)
Dept: CARE COORDINATION | Facility: CLINIC | Age: 55
End: 2025-05-19
Payer: COMMERCIAL

## 2025-06-03 ENCOUNTER — INFUSION THERAPY VISIT (OUTPATIENT)
Dept: INFUSION THERAPY | Facility: CLINIC | Age: 55
End: 2025-06-03
Attending: INTERNAL MEDICINE
Payer: COMMERCIAL

## 2025-06-03 VITALS
OXYGEN SATURATION: 97 % | SYSTOLIC BLOOD PRESSURE: 89 MMHG | BODY MASS INDEX: 22.78 KG/M2 | DIASTOLIC BLOOD PRESSURE: 59 MMHG | HEART RATE: 57 BPM | TEMPERATURE: 98.3 F | WEIGHT: 145 LBS | RESPIRATION RATE: 16 BRPM

## 2025-06-03 DIAGNOSIS — Z17.0 MALIGNANT NEOPLASM OF UPPER-OUTER QUADRANT OF RIGHT BREAST IN FEMALE, ESTROGEN RECEPTOR POSITIVE (H): Primary | ICD-10-CM

## 2025-06-03 DIAGNOSIS — C50.411 MALIGNANT NEOPLASM OF UPPER-OUTER QUADRANT OF RIGHT BREAST IN FEMALE, ESTROGEN RECEPTOR POSITIVE (H): Primary | ICD-10-CM

## 2025-06-03 PROCEDURE — 96375 TX/PRO/DX INJ NEW DRUG ADDON: CPT

## 2025-06-03 PROCEDURE — 250N000011 HC RX IP 250 OP 636: Performed by: INTERNAL MEDICINE

## 2025-06-03 PROCEDURE — 258N000003 HC RX IP 258 OP 636: Performed by: INTERNAL MEDICINE

## 2025-06-03 PROCEDURE — 96413 CHEMO IV INFUSION 1 HR: CPT

## 2025-06-03 RX ORDER — ONDANSETRON 2 MG/ML
8 INJECTION INTRAMUSCULAR; INTRAVENOUS ONCE
Status: COMPLETED | OUTPATIENT
Start: 2025-06-03 | End: 2025-06-03

## 2025-06-03 RX ORDER — HEPARIN SODIUM (PORCINE) LOCK FLUSH IV SOLN 100 UNIT/ML 100 UNIT/ML
5 SOLUTION INTRAVENOUS
Status: DISCONTINUED | OUTPATIENT
Start: 2025-06-03 | End: 2025-06-03 | Stop reason: HOSPADM

## 2025-06-03 RX ADMIN — ONDANSETRON 8 MG: 2 INJECTION INTRAMUSCULAR; INTRAVENOUS at 10:03

## 2025-06-03 RX ADMIN — SODIUM CHLORIDE 380 MG: 0.9 INJECTION, SOLUTION INTRAVENOUS at 10:07

## 2025-06-03 RX ADMIN — SODIUM CHLORIDE 250 ML: 0.9 INJECTION, SOLUTION INTRAVENOUS at 10:03

## 2025-06-03 RX ADMIN — Medication 5 ML: at 10:42

## 2025-06-03 NOTE — PROGRESS NOTES
Infusion Nursing Note:  Jenni Torres presents today for C24D1 Trazimera.    Patient seen by provider today: No   present during visit today: Not Applicable.    Note: Patient feeling well and reports no new concerns or symptoms.      Intravenous Access:  Implanted Port.    Treatment Conditions:  ECHO/MUGA completed 4/11/25  EF 65%.      Post Infusion Assessment:  Patient tolerated infusion without incident.  Blood return noted pre and post infusion.  Site patent and intact, free from redness, edema or discomfort.  No evidence of extravasations.  Access discontinued per protocol.       Discharge Plan:   Discharge instructions reviewed with: Patient.  Patient and/or family verbalized understanding of discharge instructions and all questions answered.  AVS to patient via [email or web address].  Patient will return 7/15 for next appointment.   Patient discharged in stable condition accompanied by: self.  Departure Mode: Ambulatory.      Mandi Lund RN

## 2025-06-19 ENCOUNTER — E-VISIT (OUTPATIENT)
Dept: INTERNAL MEDICINE | Facility: CLINIC | Age: 55
End: 2025-06-19
Payer: COMMERCIAL

## 2025-06-19 DIAGNOSIS — F99: Primary | ICD-10-CM

## 2025-06-24 ENCOUNTER — INFUSION THERAPY VISIT (OUTPATIENT)
Dept: INFUSION THERAPY | Facility: CLINIC | Age: 55
End: 2025-06-24
Attending: INTERNAL MEDICINE
Payer: COMMERCIAL

## 2025-06-24 VITALS
RESPIRATION RATE: 16 BRPM | TEMPERATURE: 97 F | HEART RATE: 64 BPM | WEIGHT: 144.8 LBS | BODY MASS INDEX: 22.75 KG/M2 | SYSTOLIC BLOOD PRESSURE: 94 MMHG | OXYGEN SATURATION: 98 % | DIASTOLIC BLOOD PRESSURE: 69 MMHG

## 2025-06-24 DIAGNOSIS — Z17.0 MALIGNANT NEOPLASM OF UPPER-OUTER QUADRANT OF RIGHT BREAST IN FEMALE, ESTROGEN RECEPTOR POSITIVE (H): Primary | ICD-10-CM

## 2025-06-24 DIAGNOSIS — C50.411 MALIGNANT NEOPLASM OF UPPER-OUTER QUADRANT OF RIGHT BREAST IN FEMALE, ESTROGEN RECEPTOR POSITIVE (H): Primary | ICD-10-CM

## 2025-06-24 PROCEDURE — 96413 CHEMO IV INFUSION 1 HR: CPT

## 2025-06-24 PROCEDURE — 96375 TX/PRO/DX INJ NEW DRUG ADDON: CPT

## 2025-06-24 PROCEDURE — 250N000011 HC RX IP 250 OP 636: Performed by: INTERNAL MEDICINE

## 2025-06-24 PROCEDURE — 258N000003 HC RX IP 258 OP 636: Performed by: INTERNAL MEDICINE

## 2025-06-24 RX ORDER — HEPARIN SODIUM (PORCINE) LOCK FLUSH IV SOLN 100 UNIT/ML 100 UNIT/ML
5 SOLUTION INTRAVENOUS
Status: DISCONTINUED | OUTPATIENT
Start: 2025-06-24 | End: 2025-06-24 | Stop reason: HOSPADM

## 2025-06-24 RX ORDER — ONDANSETRON 2 MG/ML
8 INJECTION INTRAMUSCULAR; INTRAVENOUS ONCE
Status: COMPLETED | OUTPATIENT
Start: 2025-06-24 | End: 2025-06-24

## 2025-06-24 RX ADMIN — SODIUM CHLORIDE 380 MG: 0.9 INJECTION, SOLUTION INTRAVENOUS at 09:46

## 2025-06-24 RX ADMIN — ONDANSETRON 8 MG: 2 INJECTION INTRAMUSCULAR; INTRAVENOUS at 09:38

## 2025-06-24 RX ADMIN — SODIUM CHLORIDE 250 ML: 0.9 INJECTION, SOLUTION INTRAVENOUS at 09:38

## 2025-06-24 RX ADMIN — Medication 5 ML: at 10:19

## 2025-06-24 NOTE — PROGRESS NOTES
Infusion Nursing Note:  Jenni Torres presents today for Q3wk Trazimera.     Patient seen by provider today: No   present during visit today: Not Applicable.    Note: Jenni will complete Trazimera in 3 weeks.  Continues on po Tamoxifen with no side effects      Intravenous Access:  Implanted Port.    Treatment Conditions:  ECHO/MUGA completed 4/11/25  EF 65%.  Repeat ECHO mid July      Post Infusion Assessment:  Patient tolerated infusion without incident.  Blood return noted pre and post infusion.  Site patent and intact, free from redness, edema or discomfort.  No evidence of extravasations.  Access discontinued per protocol.       Discharge Plan:   AVS to patient via MYCHART.  Patient will return 7/15/25 for labs/RC/treatment   Patient discharged in stable condition accompanied by: self.  Departure Mode: Ambulatory.      Merna Amos RN

## 2025-07-06 SDOH — HEALTH STABILITY: PHYSICAL HEALTH: ON AVERAGE, HOW MANY DAYS PER WEEK DO YOU ENGAGE IN MODERATE TO STRENUOUS EXERCISE (LIKE A BRISK WALK)?: 3 DAYS

## 2025-07-06 SDOH — HEALTH STABILITY: PHYSICAL HEALTH: ON AVERAGE, HOW MANY MINUTES DO YOU ENGAGE IN EXERCISE AT THIS LEVEL?: 40 MIN

## 2025-07-06 ASSESSMENT — PATIENT HEALTH QUESTIONNAIRE - PHQ9
10. IF YOU CHECKED OFF ANY PROBLEMS, HOW DIFFICULT HAVE THESE PROBLEMS MADE IT FOR YOU TO DO YOUR WORK, TAKE CARE OF THINGS AT HOME, OR GET ALONG WITH OTHER PEOPLE: NOT DIFFICULT AT ALL
SUM OF ALL RESPONSES TO PHQ QUESTIONS 1-9: 0
SUM OF ALL RESPONSES TO PHQ QUESTIONS 1-9: 0

## 2025-07-06 ASSESSMENT — SOCIAL DETERMINANTS OF HEALTH (SDOH): HOW OFTEN DO YOU GET TOGETHER WITH FRIENDS OR RELATIVES?: TWICE A WEEK

## 2025-07-07 ENCOUNTER — OFFICE VISIT (OUTPATIENT)
Dept: PEDIATRICS | Facility: CLINIC | Age: 55
End: 2025-07-07
Payer: COMMERCIAL

## 2025-07-07 VITALS
SYSTOLIC BLOOD PRESSURE: 90 MMHG | TEMPERATURE: 97.6 F | RESPIRATION RATE: 18 BRPM | OXYGEN SATURATION: 98 % | DIASTOLIC BLOOD PRESSURE: 56 MMHG | WEIGHT: 145.2 LBS | HEIGHT: 67 IN | HEART RATE: 56 BPM | BODY MASS INDEX: 22.79 KG/M2

## 2025-07-07 DIAGNOSIS — Z12.31 VISIT FOR SCREENING MAMMOGRAM: ICD-10-CM

## 2025-07-07 DIAGNOSIS — R56.9 PARTIAL SEIZURES (H): ICD-10-CM

## 2025-07-07 DIAGNOSIS — Z13.220 ENCOUNTER FOR LIPID SCREENING FOR CARDIOVASCULAR DISEASE: ICD-10-CM

## 2025-07-07 DIAGNOSIS — E03.9 ACQUIRED HYPOTHYROIDISM: Primary | ICD-10-CM

## 2025-07-07 DIAGNOSIS — Z00.00 ROUTINE GENERAL MEDICAL EXAMINATION AT A HEALTH CARE FACILITY: ICD-10-CM

## 2025-07-07 DIAGNOSIS — F33.0 MILD EPISODE OF RECURRENT MAJOR DEPRESSIVE DISORDER: ICD-10-CM

## 2025-07-07 DIAGNOSIS — E03.9 ACQUIRED HYPOTHYROIDISM: ICD-10-CM

## 2025-07-07 DIAGNOSIS — B00.1 RECURRENT COLD SORES: ICD-10-CM

## 2025-07-07 DIAGNOSIS — Z13.6 ENCOUNTER FOR LIPID SCREENING FOR CARDIOVASCULAR DISEASE: ICD-10-CM

## 2025-07-07 LAB
CHOLEST SERPL-MCNC: 182 MG/DL
FASTING STATUS PATIENT QL REPORTED: YES
HDLC SERPL-MCNC: 66 MG/DL
LDLC SERPL CALC-MCNC: 104 MG/DL
NONHDLC SERPL-MCNC: 116 MG/DL
TRIGL SERPL-MCNC: 59 MG/DL
TSH SERPL DL<=0.005 MIU/L-ACNC: 1.74 UIU/ML (ref 0.3–4.2)

## 2025-07-07 PROCEDURE — 36415 COLL VENOUS BLD VENIPUNCTURE: CPT | Performed by: INTERNAL MEDICINE

## 2025-07-07 PROCEDURE — 3078F DIAST BP <80 MM HG: CPT | Performed by: INTERNAL MEDICINE

## 2025-07-07 PROCEDURE — 1126F AMNT PAIN NOTED NONE PRSNT: CPT | Performed by: INTERNAL MEDICINE

## 2025-07-07 PROCEDURE — 99214 OFFICE O/P EST MOD 30 MIN: CPT | Mod: 25 | Performed by: INTERNAL MEDICINE

## 2025-07-07 PROCEDURE — G2211 COMPLEX E/M VISIT ADD ON: HCPCS | Performed by: INTERNAL MEDICINE

## 2025-07-07 PROCEDURE — 80061 LIPID PANEL: CPT | Performed by: INTERNAL MEDICINE

## 2025-07-07 PROCEDURE — 3074F SYST BP LT 130 MM HG: CPT | Performed by: INTERNAL MEDICINE

## 2025-07-07 PROCEDURE — 99396 PREV VISIT EST AGE 40-64: CPT | Performed by: INTERNAL MEDICINE

## 2025-07-07 PROCEDURE — 84443 ASSAY THYROID STIM HORMONE: CPT | Performed by: INTERNAL MEDICINE

## 2025-07-07 RX ORDER — LEVETIRACETAM 500 MG/1
500 TABLET ORAL DAILY
Qty: 90 TABLET | Refills: 3 | Status: SHIPPED | OUTPATIENT
Start: 2025-07-07

## 2025-07-07 RX ORDER — LEVOTHYROXINE SODIUM 137 UG/1
137 TABLET ORAL DAILY
Qty: 90 TABLET | Refills: 3 | Status: SHIPPED | OUTPATIENT
Start: 2025-07-07

## 2025-07-07 RX ORDER — VALACYCLOVIR HYDROCHLORIDE 1 G/1
2000 TABLET, FILM COATED ORAL 2 TIMES DAILY
Qty: 4 TABLET | Refills: 3 | Status: SHIPPED | OUTPATIENT
Start: 2025-07-07

## 2025-07-07 ASSESSMENT — PAIN SCALES - GENERAL: PAINLEVEL_OUTOF10: NO PAIN (0)

## 2025-07-07 NOTE — PATIENT INSTRUCTIONS
Vitamin D - [id number] units daily  Calcium - 1200 mg daily    Patient Education   Preventive Care Advice   This is general advice given by our system to help you stay healthy. However, your care team may have specific advice just for you. Please talk to your care team about your preventive care needs.  Nutrition  Eat 5 or more servings of fruits and vegetables each day.  Try wheat bread, brown rice and whole grain pasta (instead of white bread, rice, and pasta).  Get enough calcium and vitamin D. Check the label on foods and aim for 100% of the RDA (recommended daily allowance).  Lifestyle  Exercise at least 150 minutes each week  (30 minutes a day, 5 days a week).  Do muscle strengthening activities 2 days a week. These help control your weight and prevent disease.  No smoking.  Wear sunscreen to prevent skin cancer.  Have a dental exam and cleaning every 6 months.  Yearly exams  See your health care team every year to talk about:  Any changes in your health.  Any medicines your care team has prescribed.  Preventive care, family planning, and ways to prevent chronic diseases.  Shots (vaccines)   HPV shots (up to age 26), if you've never had them before.  Hepatitis B shots (up to age 59), if you've never had them before.  COVID-19 shot: Get this shot when it's due.  Flu shot: Get a flu shot every year.  Tetanus shot: Get a tetanus shot every 10 years.  Pneumococcal, hepatitis A, and RSV shots: Ask your care team if you need these based on your risk.  Shingles shot (for age 50 and up)  General health tests  Diabetes screening:  Starting at age 35, Get screened for diabetes at least every 3 years.  If you are younger than age 35, ask your care team if you should be screened for diabetes.  Cholesterol test: At age 39, start having a cholesterol test every 5 years, or more often if advised.  Bone density scan (DEXA): At age 50, ask your care team if you should have this scan for osteoporosis (brittle bones).  Hepatitis  C: Get tested at least once in your life.  STIs (sexually transmitted infections)  Before age 24: Ask your care team if you should be screened for STIs.  After age 24: Get screened for STIs if you're at risk. You are at risk for STIs (including HIV) if:  You are sexually active with more than one person.  You don't use condoms every time.  You or a partner was diagnosed with a sexually transmitted infection.  If you are at risk for HIV, ask about PrEP medicine to prevent HIV.  Get tested for HIV at least once in your life, whether you are at risk for HIV or not.  Cancer screening tests  Cervical cancer screening: If you have a cervix, begin getting regular cervical cancer screening tests starting at age 21.  Breast cancer scan (mammogram): If you've ever had breasts, begin having regular mammograms starting at age 40. This is a scan to check for breast cancer.  Colon cancer screening: It is important to start screening for colon cancer at age 45.  Have a colonoscopy test every 10 years (or more often if you're at risk) Or, ask your provider about stool tests like a FIT test every year or Cologuard test every 3 years.  To learn more about your testing options, visit:   .  For help making a decision, visit:   https://bit.ly/nt06616.  Prostate cancer screening test: If you have a prostate, ask your care team if a prostate cancer screening test (PSA) at age 55 is right for you.  Lung cancer screening: If you are a current or former smoker ages 50 to 80, ask your care team if ongoing lung cancer screenings are right for you.  For informational purposes only. Not to replace the advice of your health care provider. Copyright   2023 [employer]. All rights reserved. Clinically reviewed by the [facility] Transitions Program. [employer] [id number] - REV 01/24.

## 2025-07-07 NOTE — PROGRESS NOTES
Preventive Care Visit  [facility] KAMRAN Wheatley MD, Internal Medicine - Pediatrics  Jul 7, 2025  {Provider  Link to [facility] :970521}    {PROVIDER CHARTING PREFERENCE:747503}    Jenny Arteaga is a 54 year old, presenting for the following:  Annual Visit        7/7/2025     8:42 AM   Additional Questions   Roomed by elias   Accompanied by kenzie         7/7/2025     8:42 AM   Patient Reported Additional Medications   Patient reports taking the following new medications na          HPI  ***   {MA/LPN/RN Pre-Provider Visit Orders- hCG/UA/Strep (Optional):243931}  {SUPERLIST (Optional):819914}  {additonal problems for provider to add (Optional):293956}  Advance Care Planning  {The storyboard will display whether the patient has ACP docs on file. Hover over the Code section in the storyboard to access the ACP documents. :692012}  {(AWV REQUIRED) Advance Care Planning Reviewed:965590}        7/6/2025   General Health   How would you rate your overall physical health? Good   Feel stress (tense, anxious, or unable to sleep) Only a little   (!) STRESS CONCERN      7/6/2025   Nutrition   Three or more servings of calcium each day? (!) I DON'T KNOW   Diet: Regular (no restrictions)   How many servings of fruit and vegetables per day? (!) 2-3   How many sweetened beverages each day? 0-1         7/6/2025   Exercise   Days per week of moderate/strenous exercise 3 days   Average minutes spent exercising at this level 40 min         7/6/2025   Social Factors   Frequency of gathering with friends or relatives Twice a week   Worry food won't last until get money to buy more No   Food not last or not have enough money for food? No   Do you have housing? (Housing is defined as stable permanent housing and does not include staying outside in a car, in a tent, in an abandoned building, in an overnight shelter, or couch-surfing.) Yes   Are you worried about losing your housing? No   Lack of transportation? No    Unable to get utilities (heat,electricity)? No         7/6/2025   Fall Risk   Fallen 2 or more times in the past year? No   Trouble with walking or balance? No          7/6/2025   Dental   Dentist two times every year? Yes       Today's PHQ-9 Score:       7/6/2025    10:02 PM   PHQ-9 SCORE   PHQ-9 Total Score MyChart 0   PHQ-9 Total Score 0        Patient-reported         7/6/2025   Substance Use   Alcohol more than 3/day or more than 7/wk No   Do you use any other substances recreationally? No     Social History     Tobacco Use    Smoking status: Never     Passive exposure: Never    Smokeless tobacco: Never   Vaping Use    Vaping status: Never Used   Substance Use Topics    Alcohol use: Yes     Comment: occasionally    Drug use: Never     {Provider  If there are gaps in the social history shown above, please follow the link to update and then refresh the note Link to Social and Substance History :474181}      5/21/2024   LAST FHS-7 RESULTS   1st degree relative breast or ovarian cancer No   Any relative bilateral breast cancer No   Any male have breast cancer No   Any ONE woman have BOTH breast AND ovarian cancer No   Any woman with breast cancer before 50yrs No   2 or more relatives with breast AND/OR ovarian cancer No   2 or more relatives with breast AND/OR bowel cancer No     {If any of the questions to the FHS7 are answered yes, consider referral for genetic counseling.    Additional indications for genetic referral include personal history of breast or ovarian cancer, genetic mutation in 1st degree relative which increases risk of breast cancer including BRCA1, BRCA2, PATSY, PALB 2, TP53, CHEK2, PTEN, CDH1, STK11 (per ACS) and/or 1st degree relative with history of pancreatic or high-risk prostate cancer (per NCCN):629251}   {Mammogram Decision Support (Optional):130470}        7/6/2025   STI Screening   New sexual partner(s) since last STI/HIV test? No     History of abnormal Pap smear: { :461311}         "9/28/2021     4:42 PM   PAP / HPV   PAP-ABSTRACT See Scanned Document      ASCVD Risk   The 10-year ASCVD risk score (Pablo ODELL, et al., 2019) is: 0.7%    Values used to calculate the score:      Age: 54 years      Sex: Female      Is Non- : No      Diabetic: No      Tobacco smoker: No      Systolic Blood Pressure: 90 mmHg      Is BP treated: No      HDL Cholesterol: 65 mg/dL      Total Cholesterol: 194 mg/dL    {Link to Fracture Risk Assessment Tool (Optional):570262}    {Provider  REQUIRED FOR AWV Use the storyboard to review patient history, after sections have been marked as reviewed, refresh note to capture documentation:796157}   Reviewed and updated as needed this visit by Provider                    {HISTORY OPTIONS (Optional):183949}    {ROS Picklists (Optional):994092}     Objective    Exam  BP 90/56 (BP Location: Right arm, Patient Position: Sitting, Cuff Size: Adult Regular)   Pulse 56   Temp 97.6  F (36.4  C) (Temporal)   Resp 18   Ht 1.702 m (5' 7\")   Wt 65.9 kg (145 lb 3.2 oz)   LMP 07/24/2024 (Approximate)   SpO2 98%   BMI 22.74 kg/m     Estimated body mass index is 22.74 kg/m  as calculated from the following:    Height as of this encounter: 1.702 m (5' 7\").    Weight as of this encounter: 65.9 kg (145 lb 3.2 oz).    Physical Exam  {Exam Choices (Optional):097875}        Signed Electronically by: Jhonatan Wheatley MD  {Email feedback regarding this note to [email or web address]   :738436}  Answers submitted by the patient for this visit:  Patient Health Questionnaire (Submitted on 7/6/2025)  If you checked off any problems, how difficult have these problems made it for you to do your work, take care of things at home, or get along with other people?: Not difficult at all  PHQ9 TOTAL SCORE: 0    " "1.702 m (5' 7\")   Wt 65.9 kg (145 lb 3.2 oz)   LMP 07/24/2024 (Approximate)   SpO2 98%   BMI 22.74 kg/m     Estimated body mass index is 22.74 kg/m  as calculated from the following:    Height as of this encounter: 1.702 m (5' 7\").    Weight as of this encounter: 65.9 kg (145 lb 3.2 oz).    Physical Exam  GENERAL: alert and no distress  EYES: Eyes grossly normal to inspection, PERRL and conjunctivae and sclerae normal  HENT: ear canals and TM's normal, nose and mouth without ulcers or lesions  NECK: no adenopathy, no asymmetry, masses, or scars  RESP: lungs clear to auscultation - no rales, rhonchi or wheezes  CV: regular rate and rhythm, normal S1 S2, no S3 or S4, no murmur, click or rub, no peripheral edema  ABDOMEN: soft, nontender, no hepatosplenomegaly, no masses and bowel sounds normal  MS: no gross musculoskeletal defects noted, no edema  SKIN: no suspicious lesions or rashes  NEURO: Normal strength and tone, mentation intact and speech normal  PSYCH: mentation appears normal, affect normal/bright        Signed Electronically by: Jhonatan Wheatley MD    Answers submitted by the patient for this visit:  Patient Health Questionnaire (Submitted on 7/6/2025)  If you checked off any problems, how difficult have these problems made it for you to do your work, take care of things at home, or get along with other people?: Not difficult at all  PHQ9 TOTAL SCORE: 0    "

## 2025-07-08 ENCOUNTER — PATIENT OUTREACH (OUTPATIENT)
Dept: CARE COORDINATION | Facility: CLINIC | Age: 55
End: 2025-07-08
Payer: COMMERCIAL

## 2025-07-10 ENCOUNTER — PATIENT OUTREACH (OUTPATIENT)
Dept: CARE COORDINATION | Facility: CLINIC | Age: 55
End: 2025-07-10
Payer: COMMERCIAL

## 2025-07-14 ENCOUNTER — HOSPITAL ENCOUNTER (OUTPATIENT)
Dept: CARDIOLOGY | Facility: CLINIC | Age: 55
Discharge: HOME | End: 2025-07-14
Attending: PHYSICIAN ASSISTANT | Admitting: PHYSICIAN ASSISTANT
Payer: COMMERCIAL

## 2025-07-14 DIAGNOSIS — I42.7 CARDIOMYOPATHY DUE TO DRUG AND EXTERNAL AGENT: ICD-10-CM

## 2025-07-14 LAB
BI-PLANE LVEF ECHO: NORMAL
LVEF ECHO: NORMAL

## 2025-07-14 PROCEDURE — 93308 TTE F-UP OR LMTD: CPT | Mod: 26 | Performed by: INTERNAL MEDICINE

## 2025-07-14 PROCEDURE — 93321 DOPPLER ECHO F-UP/LMTD STD: CPT | Mod: 26 | Performed by: INTERNAL MEDICINE

## 2025-07-14 PROCEDURE — 93325 DOPPLER ECHO COLOR FLOW MAPG: CPT | Mod: 26 | Performed by: INTERNAL MEDICINE

## 2025-07-14 PROCEDURE — 93325 DOPPLER ECHO COLOR FLOW MAPG: CPT

## 2025-07-15 ENCOUNTER — INFUSION THERAPY VISIT (OUTPATIENT)
Dept: INFUSION THERAPY | Facility: CLINIC | Age: 55
End: 2025-07-15
Attending: INTERNAL MEDICINE
Payer: COMMERCIAL

## 2025-07-15 ENCOUNTER — ONCOLOGY VISIT (OUTPATIENT)
Dept: ONCOLOGY | Facility: CLINIC | Age: 55
End: 2025-07-15
Attending: INTERNAL MEDICINE
Payer: COMMERCIAL

## 2025-07-15 VITALS
TEMPERATURE: 98.3 F | HEART RATE: 61 BPM | SYSTOLIC BLOOD PRESSURE: 101 MMHG | WEIGHT: 146.1 LBS | OXYGEN SATURATION: 98 % | DIASTOLIC BLOOD PRESSURE: 64 MMHG | RESPIRATION RATE: 16 BRPM | BODY MASS INDEX: 22.93 KG/M2 | HEIGHT: 67 IN

## 2025-07-15 DIAGNOSIS — C50.411 MALIGNANT NEOPLASM OF UPPER-OUTER QUADRANT OF RIGHT BREAST IN FEMALE, ESTROGEN RECEPTOR POSITIVE (H): Primary | ICD-10-CM

## 2025-07-15 DIAGNOSIS — Z17.0 MALIGNANT NEOPLASM OF UPPER-OUTER QUADRANT OF RIGHT BREAST IN FEMALE, ESTROGEN RECEPTOR POSITIVE (H): Primary | ICD-10-CM

## 2025-07-15 DIAGNOSIS — Z17.0 MALIGNANT NEOPLASM OF UPPER-OUTER QUADRANT OF RIGHT BREAST IN FEMALE, ESTROGEN RECEPTOR POSITIVE (H): ICD-10-CM

## 2025-07-15 DIAGNOSIS — C50.411 MALIGNANT NEOPLASM OF UPPER-OUTER QUADRANT OF RIGHT BREAST IN FEMALE, ESTROGEN RECEPTOR POSITIVE (H): ICD-10-CM

## 2025-07-15 DIAGNOSIS — C50.911 INVASIVE DUCTAL CARCINOMA OF RIGHT BREAST, STAGE 1 (H): Primary | ICD-10-CM

## 2025-07-15 LAB
ALBUMIN SERPL BCG-MCNC: 4.4 G/DL (ref 3.5–5.2)
ALP SERPL-CCNC: 47 U/L (ref 40–150)
ALT SERPL W P-5'-P-CCNC: 13 U/L (ref 0–50)
ANION GAP SERPL CALCULATED.3IONS-SCNC: 9 MMOL/L (ref 7–15)
AST SERPL W P-5'-P-CCNC: 23 U/L (ref 0–45)
BASOPHILS # BLD AUTO: 0 10E3/UL (ref 0–0.2)
BASOPHILS NFR BLD AUTO: 1 %
BILIRUB SERPL-MCNC: 0.4 MG/DL
BUN SERPL-MCNC: 16.4 MG/DL (ref 6–20)
CALCIUM SERPL-MCNC: 8.9 MG/DL (ref 8.8–10.4)
CHLORIDE SERPL-SCNC: 105 MMOL/L (ref 98–107)
CREAT SERPL-MCNC: 0.96 MG/DL (ref 0.51–0.95)
EGFRCR SERPLBLD CKD-EPI 2021: 70 ML/MIN/1.73M2
EOSINOPHIL # BLD AUTO: 0 10E3/UL (ref 0–0.7)
EOSINOPHIL NFR BLD AUTO: 1 %
ERYTHROCYTE [DISTWIDTH] IN BLOOD BY AUTOMATED COUNT: 12.2 % (ref 10–15)
GLUCOSE SERPL-MCNC: 86 MG/DL (ref 70–99)
HCO3 SERPL-SCNC: 26 MMOL/L (ref 22–29)
HCT VFR BLD AUTO: 37.8 % (ref 35–47)
HGB BLD-MCNC: 12.8 G/DL (ref 11.7–15.7)
IMM GRANULOCYTES # BLD: 0 10E3/UL
IMM GRANULOCYTES NFR BLD: 0 %
LYMPHOCYTES # BLD AUTO: 1.3 10E3/UL (ref 0.8–5.3)
LYMPHOCYTES NFR BLD AUTO: 22 %
MCH RBC QN AUTO: 31.4 PG (ref 26.5–33)
MCHC RBC AUTO-ENTMCNC: 33.9 G/DL (ref 31.5–36.5)
MCV RBC AUTO: 93 FL (ref 78–100)
MONOCYTES # BLD AUTO: 0.4 10E3/UL (ref 0–1.3)
MONOCYTES NFR BLD AUTO: 6 %
NEUTROPHILS # BLD AUTO: 4.1 10E3/UL (ref 1.6–8.3)
NEUTROPHILS NFR BLD AUTO: 71 %
NRBC # BLD AUTO: 0 10E3/UL
NRBC BLD AUTO-RTO: 0 /100
PLATELET # BLD AUTO: 185 10E3/UL (ref 150–450)
POTASSIUM SERPL-SCNC: 4.2 MMOL/L (ref 3.4–5.3)
PROT SERPL-MCNC: 6.6 G/DL (ref 6.4–8.3)
RBC # BLD AUTO: 4.07 10E6/UL (ref 3.8–5.2)
SODIUM SERPL-SCNC: 140 MMOL/L (ref 135–145)
VIT D+METAB SERPL-MCNC: 33 NG/ML (ref 20–50)
WBC # BLD AUTO: 5.8 10E3/UL (ref 4–11)

## 2025-07-15 PROCEDURE — 85004 AUTOMATED DIFF WBC COUNT: CPT | Performed by: INTERNAL MEDICINE

## 2025-07-15 PROCEDURE — 96413 CHEMO IV INFUSION 1 HR: CPT

## 2025-07-15 PROCEDURE — 99213 OFFICE O/P EST LOW 20 MIN: CPT | Mod: 25 | Performed by: INTERNAL MEDICINE

## 2025-07-15 PROCEDURE — 99214 OFFICE O/P EST MOD 30 MIN: CPT | Performed by: INTERNAL MEDICINE

## 2025-07-15 PROCEDURE — 250N000011 HC RX IP 250 OP 636: Performed by: INTERNAL MEDICINE

## 2025-07-15 PROCEDURE — 96375 TX/PRO/DX INJ NEW DRUG ADDON: CPT

## 2025-07-15 PROCEDURE — 82306 VITAMIN D 25 HYDROXY: CPT | Performed by: INTERNAL MEDICINE

## 2025-07-15 PROCEDURE — 84155 ASSAY OF PROTEIN SERUM: CPT | Performed by: INTERNAL MEDICINE

## 2025-07-15 PROCEDURE — 258N000003 HC RX IP 258 OP 636: Performed by: INTERNAL MEDICINE

## 2025-07-15 PROCEDURE — 36415 COLL VENOUS BLD VENIPUNCTURE: CPT

## 2025-07-15 PROCEDURE — G2211 COMPLEX E/M VISIT ADD ON: HCPCS | Performed by: INTERNAL MEDICINE

## 2025-07-15 RX ORDER — ONDANSETRON 2 MG/ML
8 INJECTION INTRAMUSCULAR; INTRAVENOUS ONCE
Status: COMPLETED | OUTPATIENT
Start: 2025-07-15 | End: 2025-07-15

## 2025-07-15 RX ORDER — HEPARIN SODIUM (PORCINE) LOCK FLUSH IV SOLN 100 UNIT/ML 100 UNIT/ML
5 SOLUTION INTRAVENOUS
Status: DISCONTINUED | OUTPATIENT
Start: 2025-07-15 | End: 2025-07-15 | Stop reason: HOSPADM

## 2025-07-15 RX ADMIN — SODIUM CHLORIDE 100 ML: 9 INJECTION, SOLUTION INTRAVENOUS at 10:18

## 2025-07-15 RX ADMIN — SODIUM CHLORIDE 380 MG: 0.9 INJECTION, SOLUTION INTRAVENOUS at 10:29

## 2025-07-15 RX ADMIN — ONDANSETRON 8 MG: 2 INJECTION INTRAMUSCULAR; INTRAVENOUS at 10:19

## 2025-07-15 RX ADMIN — Medication 5 ML: at 11:01

## 2025-07-15 ASSESSMENT — PAIN SCALES - GENERAL: PAINLEVEL_OUTOF10: NO PAIN (0)

## 2025-07-15 NOTE — PROGRESS NOTES
Oncology progress note    Name: Jenni Torres  : 1970  MRN: [id number]    CC: Breast Cancer    HPI: Jenni Torres is a 54 year old female with a right pT1cN0 hormone receptor positive, HER2 positive breast cancer s/p lumpectomy s/p adjuvant Taxol/Herceptin currently on maintenance Herceptin and tamoxifen here for follow up.    Tolerating tamoxifen well.  No hot flashes. No breast masses.  No menses. Not taking vitamin D currently.    History  24: right lumpectomy 1.3cm positive DCIS margins. 0/3 LN   24 re-excision: negative margins  24: week 1 Taxol/Herceptin  24: JORGITO  24: week 10 Taxol/Herceptin  10/11/24: week 12 Taxol/Herceptin  Negative genetics  Nov-Dec 2024: radiation  2025: tamoxifen    Review of systems: All other systems reviewed and are negative except for what is described in the history of present illness.      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  Last seizure  before starting medicine      Acquired hypothyroidism 2006     Priority: Medium    Allergic rhinitis 2006     Priority: Medium    Pain in joint 2006     Priority: Medium    Premenstrual tension syndrome 2006     Priority: Medium     Past Medical History:   Diagnosis Date    Hypothyroidism     Kidney stone 2018    Seizures (H)        Medications:   Current Outpatient  Medications:     levETIRAcetam (KEPPRA) 500 MG tablet, Take 1 tablet (500 mg) by mouth daily., Disp: 90 tablet, Rfl: 3    levothyroxine (SYNTHROID/LEVOTHROID) 137 MCG tablet, Take 1 tablet (137 mcg) by mouth daily., Disp: 90 tablet, Rfl: 3    lidocaine-prilocaine (EMLA) 2.5-2.5 % external cream, Apply topically as needed for other (apply to port site 30 minutes prior to arrival at infusion center), Disp: 30 g, Rfl: 1    Multiple Vitamin (ONE-A-DAY ESSENTIAL) TABS, Take 1 tablet by mouth daily, Disp: , Rfl:     prochlorperazine (COMPAZINE) 10 MG tablet, Take 1 tablet (10 mg) by mouth every 6 hours as needed for nausea or vomiting, Disp: 30 tablet, Rfl: 2    sertraline (ZOLOFT) 50 MG tablet, Take 1 tablet (50 mg) by mouth daily., Disp: 90 tablet, Rfl: 3    tamoxifen (NOLVADEX) 20 MG tablet, Take 1 tablet (20 mg) by mouth daily., Disp: 90 tablet, Rfl: 3    valACYclovir (VALTREX) 1000 mg tablet, Take 2 tablets (2,000 mg) by mouth 2 times daily., Disp: 4 tablet, Rfl: 3  No current facility-administered medications for this visit.    Facility-Administered Medications Ordered in Other Visits:     heparin lock flush 100 unit/mL injection 5 mL, 5 mL, Intracatheter, Once PRN, Christiana Tomas MD, 5 mL at 07/15/25 1022    sodium chloride (PF) 0.9% PF flush 3-20 mL, 3-20 mL, Intracatheter, q1 min prn, Christiana Tomas MD, 10 mL at 07/15/25 1022    trastuzumab-qyyp (TRAZIMERA) 380 mg in sodium chloride 0.9 % 293.1 mL infusion, 6 mg/kg (Treatment Plan Recorded), Intravenous, Once, Christiana Tomas MD    Allergies: No Known Allergies    Family history:  Family History   Problem Relation Age of Onset    Hypertension Mother     Hyperlipidemia Mother     Obesity Mother     Hypertension Father     Hyperlipidemia Father     Asthma Maternal Grandmother     Obesity Maternal Grandmother     Prostate Cancer Paternal Grandfather     Breast Cancer Paternal Grandmother     Thyroid Disease Paternal Grandmother        Physical  "exam:  Vitals:/64   Pulse 61   Temp 98.3  F (36.8  C) (Temporal)   Resp 16   Ht 1.702 m (5' 7\")   Wt 66.3 kg (146 lb 1.6 oz)   LMP 07/24/2024 (Approximate)   SpO2 98%   BMI 22.88 kg/m    GENERAL: No acute distress, pleasant, PS 0  EYES: Sclera anicteric  NECK: Supple  LYMPH NODES: No cervical, supraclavicular lymphadenopathy  CARDIOVASCULAR: Regular rate and rhythm  LUNGS: Clear to auscultation bilaterally  EXTREMITIES: No edema  SKIN: No rashes or petechiae, port clear  NEURO: Normal gait    Labs:  Lab Results   Component Value Date    WBC 5.8 07/15/2025    HGB 12.8 07/15/2025    HCT 37.8 07/15/2025     07/15/2025     07/15/2025    POTASSIUM 4.2 07/15/2025    CHLORIDE 105 07/15/2025    CO2 26 07/15/2025    BUN 16.4 07/15/2025    CR 0.96 (H) 07/15/2025    GLC 86 07/15/2025    SED 8 11/26/2023    AST 23 07/15/2025    ALT 13 07/15/2025    ALKPHOS 47 07/15/2025    BILITOTAL 0.4 07/15/2025      Radiology  Echo 7/14/25: EF 60%    April 2025 breast MRI BIRADS 1     Assessment/plan:   Jenni Torres is a 54 year old female with a right pT1cN0 hormone receptor positive, HER2 positive breast cancer s/p lumpectomy s/p adjuvant Taxol/Herceptin currently on maintenance Herceptin and Tamoxifen here for follow up.    1.  Breast Cancer: last adjuvant Herceptin q3 weeks today! Ok to remove port. Echo reviewed.  Continue tamoxifen through at least January 2030 although consider extended endocrine therapy. Mammogram ordered for Sept 2025. Breast MRI due April 2026.  Will check hormone levels in Fall 2025 if remains amenorrheic to determine if she is in menopause with then transition to AI (will also need baseline DEXA). Call if new symptoms develop. We reviewed plans for monitoring.    All of the patient's questions were answered.    Return to the office in 3-4 months or sooner as needed    Thank you for allowing me to participate in the care of your patient.  Please call with any questions.    I " personally reviewed the recent studies and I explained the rationale of the tests ordered today to the patient.      Cancer Staging   Malignant neoplasm of upper-outer quadrant of right breast in female, estrogen receptor positive (H)  Staging form: Breast, AJCC 8th Edition  - Pathologic stage from 6/24/2024: pT1c, pN0, cM0, ER+, KS+, HER2+ - Signed by Christiana Tomas MD on 7/15/2025    The longitudinal plan of care for the diagnosis(es)/condition(s) as documented were addressed during this visit. Due to the added complexity in care, I will continue to support Jenni in the subsequent management and with ongoing continuity of care.      Orders Placed This Encounter   Procedures    MA Diagnostic Bilateral w/ Dago    US Breast Right Complete 4 Quadrants    Estradiol    Follicle stimulating hormone

## 2025-07-15 NOTE — LETTER
7/15/2025      Jenni BOWENS Sallykierra  [address]  [place] [address]      Dear Colleague,    Thank you for referring your patient, Jenni Torres, to the [facility]. Please see a copy of my visit note below.    Oncology progress note    Name: Jenni Torres  : 1970  MRN: [id number]    CC: Breast Cancer    HPI: Jenni Torres is a 54 year old female with a right pT1cN0 hormone receptor positive, HER2 positive breast cancer s/p lumpectomy s/p adjuvant Taxol/Herceptin currently on maintenance Herceptin and tamoxifen here for follow up.    Tolerating tamoxifen well.  No hot flashes. No breast masses.  No menses. Not taking vitamin D currently.    History  24: right lumpectomy 1.3cm positive DCIS margins. 0/3 LN   24 re-excision: negative margins  24: week 1 Taxol/Herceptin  24: JORGITO  24: week 10 Taxol/Herceptin  10/11/24: week 12 Taxol/Herceptin  Negative genetics  Nov-Dec 2024: radiation  2025: tamoxifen    Review of systems: All other systems reviewed and are negative except for what is described in the history of present illness.      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  Last seizure  before starting medicine       Acquired hypothyroidism 2006     Priority: Medium     Allergic rhinitis 2006     Priority: Medium      Pain in joint 04/04/2006     Priority: Medium     Premenstrual tension syndrome 04/04/2006     Priority: Medium     Past Medical History:   Diagnosis Date     Hypothyroidism      Kidney stone 11/2018     Seizures (H)        Medications:   Current Outpatient Medications:      levETIRAcetam (KEPPRA) 500 MG tablet, Take 1 tablet (500 mg) by mouth daily., Disp: 90 tablet, Rfl: 3     levothyroxine (SYNTHROID/LEVOTHROID) 137 MCG tablet, Take 1 tablet (137 mcg) by mouth daily., Disp: 90 tablet, Rfl: 3     lidocaine-prilocaine (EMLA) 2.5-2.5 % external cream, Apply topically as needed for other (apply to port site 30 minutes prior to arrival at infusion center), Disp: 30 g, Rfl: 1     Multiple Vitamin (ONE-A-DAY ESSENTIAL) TABS, Take 1 tablet by mouth daily, Disp: , Rfl:      prochlorperazine (COMPAZINE) 10 MG tablet, Take 1 tablet (10 mg) by mouth every 6 hours as needed for nausea or vomiting, Disp: 30 tablet, Rfl: 2     sertraline (ZOLOFT) 50 MG tablet, Take 1 tablet (50 mg) by mouth daily., Disp: 90 tablet, Rfl: 3     tamoxifen (NOLVADEX) 20 MG tablet, Take 1 tablet (20 mg) by mouth daily., Disp: 90 tablet, Rfl: 3     valACYclovir (VALTREX) 1000 mg tablet, Take 2 tablets (2,000 mg) by mouth 2 times daily., Disp: 4 tablet, Rfl: 3  No current facility-administered medications for this visit.    Facility-Administered Medications Ordered in Other Visits:      heparin lock flush 100 unit/mL injection 5 mL, 5 mL, Intracatheter, Once PRN, Christiana Tomas MD, 5 mL at 07/15/25 1022     sodium chloride (PF) 0.9% PF flush 3-20 mL, 3-20 mL, Intracatheter, q1 min prn, Christiana Tomas MD, 10 mL at 07/15/25 1022     trastuzumab-qyyp (TRAZIMERA) 380 mg in sodium chloride 0.9 % 293.1 mL infusion, 6 mg/kg (Treatment Plan Recorded), Intravenous, Once, Christiana Tomas MD    Allergies: No Known Allergies    Family history:  Family History   Problem Relation Age of Onset     Hypertension Mother      Hyperlipidemia Mother       "Obesity Mother      Hypertension Father      Hyperlipidemia Father      Asthma Maternal Grandmother      Obesity Maternal Grandmother      Prostate Cancer Paternal Grandfather      Breast Cancer Paternal Grandmother      Thyroid Disease Paternal Grandmother        Physical exam:  Vitals:/64   Pulse 61   Temp 98.3  F (36.8  C) (Temporal)   Resp 16   Ht 1.702 m (5' 7\")   Wt 66.3 kg (146 lb 1.6 oz)   LMP 07/24/2024 (Approximate)   SpO2 98%   BMI 22.88 kg/m    GENERAL: No acute distress, pleasant, PS 0  EYES: Sclera anicteric  NECK: Supple  LYMPH NODES: No cervical, supraclavicular lymphadenopathy  CARDIOVASCULAR: Regular rate and rhythm  LUNGS: Clear to auscultation bilaterally  EXTREMITIES: No edema  SKIN: No rashes or petechiae, port clear  NEURO: Normal gait    Labs:  Lab Results   Component Value Date    WBC 5.8 07/15/2025    HGB 12.8 07/15/2025    HCT 37.8 07/15/2025     07/15/2025     07/15/2025    POTASSIUM 4.2 07/15/2025    CHLORIDE 105 07/15/2025    CO2 26 07/15/2025    BUN 16.4 07/15/2025    CR 0.96 (H) 07/15/2025    GLC 86 07/15/2025    SED 8 11/26/2023    AST 23 07/15/2025    ALT 13 07/15/2025    ALKPHOS 47 07/15/2025    BILITOTAL 0.4 07/15/2025      Radiology  Echo 7/14/25: EF 60%    April 2025 breast MRI BIRADS 1     Assessment/plan:   Jenni Torres is a 54 year old female with a right pT1cN0 hormone receptor positive, HER2 positive breast cancer s/p lumpectomy s/p adjuvant Taxol/Herceptin currently on maintenance Herceptin and Tamoxifen here for follow up.    1.  Breast Cancer: last adjuvant Herceptin q3 weeks today! Ok to remove port. Echo reviewed.  Continue tamoxifen through at least January 2030 although consider extended endocrine therapy. Mammogram ordered for Sept 2025. Breast MRI due April 2026.  Will check hormone levels in Fall 2025 if remains amenorrheic to determine if she is in menopause with then transition to AI (will also need baseline DEXA). Call if new " symptoms develop. We reviewed plans for monitoring.    All of the patient's questions were answered.    Return to the office in 3-4 months or sooner as needed    Thank you for allowing me to participate in the care of your patient.  Please call with any questions.    I personally reviewed the recent studies and I explained the rationale of the tests ordered today to the patient.      Cancer Staging   Malignant neoplasm of upper-outer quadrant of right breast in female, estrogen receptor positive (H)  Staging form: Breast, AJCC 8th Edition  - Pathologic stage from 6/24/2024: pT1c, pN0, cM0, ER+, IL+, HER2+ - Signed by Christiana Tomas MD on 7/15/2025    The longitudinal plan of care for the diagnosis(es)/condition(s) as documented were addressed during this visit. Due to the added complexity in care, I will continue to support Jenni in the subsequent management and with ongoing continuity of care.      Orders Placed This Encounter   Procedures     MA Diagnostic Bilateral w/ Dago     US Breast Right Complete 4 Quadrants     Estradiol     Follicle stimulating hormone        Again, thank you for allowing me to participate in the care of your patient.        Sincerely,        Christiana Tomas MD    Electronically signed

## 2025-07-15 NOTE — PROGRESS NOTES
Infusion Nursing Note:  Jenni Torres presents today for C26D1 Trastuzumab.    Patient seen by provider today: Yes: Dr. Christiana Tomas   present during visit today: Not Applicable.    Note: Jenni reports she is feeling well today.  She is excited that this is her final infusion treatment.    Intravenous Access:  Implanted Port.    Treatment Conditions:  Lab Results   Component Value Date    HGB 12.8 07/15/2025    WBC 5.8 07/15/2025    ANEU 4.1 07/15/2025     07/15/2025     Lab Results   Component Value Date     07/15/2025    POTASSIUM 4.2 07/15/2025    CR 0.96 (H) 07/15/2025    LEDA 8.9 07/15/2025    BILITOTAL 0.4 07/15/2025    ALBUMIN 4.4 07/15/2025    ALT 13 07/15/2025    AST 23 07/15/2025     ECHO/MUGA completed 7/14/25  EF 55-60%.    Post Infusion Assessment:  Patient tolerated infusion without incident.  Blood return noted pre and post infusion.  Site patent and intact, free from redness, edema or discomfort.  No evidence of extravasations.  Access discontinued per protocol.     Discharge Plan:   Discharge instructions reviewed with: Patient.  Patient and/or family verbalized understanding of discharge instructions and all questions answered.  AVS to patient via [employer].  Patient will return in October 2025 for next appointment, awaiting scheduling.  Patient discharged in stable condition accompanied by: self.  Departure Mode: Ambulatory.      Rico Daily RN

## 2025-07-15 NOTE — PROGRESS NOTES
Nursing Note:  Jenni Torres presents today for port access and labs.    Patient seen by provider today: Yes: Dr. Tomas   present during visit today: Not Applicable.    Note: N/A.    Intravenous Access:  Lab draw site port , Needle type [place], Gauge 20.  Labs drawn without difficulty.  Implanted Port.    Discharge Plan:   Patient was sent to [facility] for MD appointment.    Misa Sanchez RN

## 2025-07-15 NOTE — NURSING NOTE
"Oncology Rooming Note    July 15, 2025 9:48 AM   Jenni Torres is a 54 year old female who presents for:    Chief Complaint   Patient presents with    Oncology Clinic Visit     Malignant neoplasm of upper-outer quadrant of right breast in female, estrogen receptor positive     Initial Vitals: /64   Pulse 61   Temp 98.3  F (36.8  C) (Temporal)   Resp 16   Ht 1.702 m (5' 7\")   Wt 66.3 kg (146 lb 1.6 oz)   LMP 07/24/2024 (Approximate)   SpO2 98%   BMI 22.88 kg/m   Estimated body mass index is 22.88 kg/m  as calculated from the following:    Height as of this encounter: 1.702 m (5' 7\").    Weight as of this encounter: 66.3 kg (146 lb 1.6 oz). Body surface area is 1.77 meters squared.  No Pain (0) Comment: Data Unavailable   Patient's last menstrual period was 07/24/2024 (approximate).  Allergies reviewed: Yes  Medications reviewed: Yes    Medications: Medication refills not needed today.  Pharmacy name entered into EPIC: [facility] - [place], [place] - [address]    PHQ9:  Did this patient require a PHQ9?: No      Clinical concerns: f/u       Niyah Camara CMA              "

## 2025-07-16 ENCOUNTER — TELEPHONE (OUTPATIENT)
Dept: SURGERY | Facility: CLINIC | Age: 55
End: 2025-07-16
Payer: COMMERCIAL

## 2025-07-16 NOTE — TELEPHONE ENCOUNTER
Type of surgery: REMOVAL VASCULAR ACCESS PORT  Location of surgery: [facility]  Date and time of surgery: 8/5/2025 @ 1:00 PM   Surgeon: Bindu Patel MD    Pre-Op Appt Date: NOT NEEDED   Post-Op Appt Date: PATIENT TO SCHEDULE    Packet sent out: Yes  Pre-cert/Authorization completed:  Not Applicable  Date: 7/16/2025        REMOVAL VASCULAR ACCESS PORT LOCAL ONLY 15 MIN REQ NON ANN NMS

## 2025-08-05 ENCOUNTER — HOSPITAL ENCOUNTER (OUTPATIENT)
Facility: CLINIC | Age: 55
Discharge: HOME | End: 2025-08-05
Attending: SURGERY | Admitting: SURGERY
Payer: COMMERCIAL

## 2025-08-05 VITALS
OXYGEN SATURATION: 98 % | WEIGHT: 147.4 LBS | HEIGHT: 67 IN | BODY MASS INDEX: 23.13 KG/M2 | SYSTOLIC BLOOD PRESSURE: 97 MMHG | TEMPERATURE: 97.5 F | HEART RATE: 58 BPM | RESPIRATION RATE: 16 BRPM | DIASTOLIC BLOOD PRESSURE: 70 MMHG

## 2025-08-05 PROCEDURE — 710N000012 HC RECOVERY PHASE 2, PER MINUTE: Performed by: SURGERY

## 2025-08-05 PROCEDURE — 360N000075 HC SURGERY LEVEL 2, PER MIN: Performed by: SURGERY

## 2025-08-05 PROCEDURE — 272N000001 HC OR GENERAL SUPPLY STERILE: Performed by: SURGERY

## 2025-08-05 PROCEDURE — 250N000009 HC RX 250: Performed by: SURGERY

## 2025-08-05 PROCEDURE — 250N000011 HC RX IP 250 OP 636: Performed by: SURGERY

## 2025-08-05 PROCEDURE — 999N000141 HC STATISTIC PRE-PROCEDURE NURSING ASSESSMENT: Performed by: SURGERY

## 2025-08-05 PROCEDURE — 36590 REMOVAL TUNNELED CV CATH: CPT | Performed by: SURGERY

## 2025-08-05 ASSESSMENT — ACTIVITIES OF DAILY LIVING (ADL): ADLS_ACUITY_SCORE: 35

## (undated) DEVICE — SOLUTION IRRIGATION 0.9% NACL 1000ML BOTTLE R5200-01

## (undated) DEVICE — SU VICRYL 3-0 SH 27" UND J416H

## (undated) DEVICE — SU SILK 3-0 PS-1 18" 1684G

## (undated) DEVICE — SU VICRYL 4-0 PS-2 18" UND J496H

## (undated) DEVICE — PACK MINOR CUSTOM RIDGES SBA32RMRMA

## (undated) DEVICE — SU VICRYL+ 3-0 27IN SH UND VCP416H

## (undated) DEVICE — PREP CHLORAPREP 26ML TINTED HI-LITE ORANGE 930815

## (undated) DEVICE — ESU GROUND PAD ADULT W/CORD E7507

## (undated) DEVICE — DRAPE LAP W/ARMBOARD 29410

## (undated) DEVICE — SYR 10ML SLIP TIP W/O NDL 303134

## (undated) DEVICE — SOL NACL 0.9% INJ 250ML BAG 2B1322Q

## (undated) DEVICE — COVER PROBE ULTRASOUND 3D W/GEL 5X96" LF 20-P3D596

## (undated) DEVICE — MARKER MARGIN MARKER STD 6 COLOR SGL USE MMS6

## (undated) DEVICE — SU DERMABOND MINI DHVM12

## (undated) DEVICE — SUCTION MANIFOLD NEPTUNE 2 SYS 4 PORT 0702-020-000

## (undated) DEVICE — LINEN FULL SHEET 5511

## (undated) DEVICE — GLOVE PROTEXIS BLUE W/NEU-THERA 7.0  2D73EB70

## (undated) DEVICE — CLIP ETHICON LIGACLIP SM BLUE LT100

## (undated) DEVICE — SU VICRYL 3-0 SH 27" J316H

## (undated) DEVICE — SU VICRYL 3-0 TIE 12X18" J904T

## (undated) DEVICE — SET BREAST BIOPSY LOCALIZER 20 PROBE 8MM PENCIL 09-0006

## (undated) DEVICE — SOL NACL 0.9% IRRIG 1000ML BOTTLE 2F7124

## (undated) DEVICE — BLADE KNIFE SURG 15 371115

## (undated) DEVICE — SPONGE RAY-TEC 4X8" 7318

## (undated) DEVICE — GLOVE BIOGEL PI MICRO INDICATOR UNDERGLOVE SZ 6.5 48965

## (undated) DEVICE — SYR 10ML LL W/O NDL

## (undated) DEVICE — SUTURE BOOTS 051003PBX

## (undated) DEVICE — SU VICRYL 2-0 SH 27" J317H

## (undated) DEVICE — LINEN TOWEL PACK X10 5473

## (undated) DEVICE — SYR 03ML LL W/O NDL 309657

## (undated) DEVICE — SU DERMABOND ADVANCED .7ML DNX12

## (undated) DEVICE — TUBING SUCTION 12"X1/4" N612

## (undated) DEVICE — NDL 22GA 1.5"

## (undated) DEVICE — SUCTION CANISTER MEDIVAC LINER 3000ML W/LID 65651-530

## (undated) DEVICE — GLOVE BIOGEL PI MICRO SZ 6.5 48565

## (undated) DEVICE — BAG CLEAR TRASH 1.3M 39X33" P4040C

## (undated) DEVICE — DRAPE C-ARM 60X42" 1013

## (undated) DEVICE — PAD CHUX UNDERPAD 30X36" P3036C

## (undated) DEVICE — SYR 10ML LL W/O NDL 302995

## (undated) DEVICE — LINEN HALF SHEET 5512

## (undated) DEVICE — DECANTER VIAL 2006S

## (undated) DEVICE — DECANTER BAG 2002S

## (undated) DEVICE — NDL 25GA 1.5" 305127

## (undated) DEVICE — NDL BLUNT 18GA 1.5" FILTER 305211

## (undated) DEVICE — BLADE KNIFE SURG 11 371111

## (undated) DEVICE — NDL BLUNT 18GA 1" W/O FILTER 305181

## (undated) DEVICE — Device

## (undated) DEVICE — SU VICRYL+ 4-0 UNDYED PS-2 VCP496ZH

## (undated) DEVICE — SUCTION TIP YANKAUER W/O VENT K86

## (undated) DEVICE — SOL WATER BACTERIOSTATIC 30ML VIAL

## (undated) DEVICE — GLOVE BIOGEL PI MICRO INDICATOR UNDERGLOVE SZ 7.0 48970

## (undated) DEVICE — SYR 05ML LL W/O NDL

## (undated) DEVICE — NEEDLE HYPO MONOJECT 22GA 1.15IN BLUE 8881250206

## (undated) RX ORDER — BUPIVACAINE HYDROCHLORIDE 2.5 MG/ML
INJECTION, SOLUTION EPIDURAL; INFILTRATION; INTRACAUDAL
Status: DISPENSED
Start: 2024-06-24

## (undated) RX ORDER — ACETAMINOPHEN 325 MG/1
TABLET ORAL
Status: DISPENSED
Start: 2024-06-24

## (undated) RX ORDER — HYDROMORPHONE HYDROCHLORIDE 1 MG/ML
INJECTION, SOLUTION INTRAMUSCULAR; INTRAVENOUS; SUBCUTANEOUS
Status: DISPENSED
Start: 2024-06-24

## (undated) RX ORDER — PROPOFOL 10 MG/ML
INJECTION, EMULSION INTRAVENOUS
Status: DISPENSED
Start: 2024-07-11

## (undated) RX ORDER — ONDANSETRON 2 MG/ML
INJECTION INTRAMUSCULAR; INTRAVENOUS
Status: DISPENSED
Start: 2024-07-11

## (undated) RX ORDER — GLYCOPYRROLATE 0.2 MG/ML
INJECTION, SOLUTION INTRAMUSCULAR; INTRAVENOUS
Status: DISPENSED
Start: 2024-07-11

## (undated) RX ORDER — FENTANYL CITRATE 50 UG/ML
INJECTION, SOLUTION INTRAMUSCULAR; INTRAVENOUS
Status: DISPENSED
Start: 2024-06-24

## (undated) RX ORDER — NEOSTIGMINE METHYLSULFATE 1 MG/ML
VIAL (ML) INJECTION
Status: DISPENSED
Start: 2024-07-11

## (undated) RX ORDER — DEXMEDETOMIDINE HYDROCHLORIDE 4 UG/ML
INJECTION, SOLUTION INTRAVENOUS
Status: DISPENSED
Start: 2024-07-11

## (undated) RX ORDER — DEXAMETHASONE SODIUM PHOSPHATE 4 MG/ML
INJECTION, SOLUTION INTRA-ARTICULAR; INTRALESIONAL; INTRAMUSCULAR; INTRAVENOUS; SOFT TISSUE
Status: DISPENSED
Start: 2024-07-11

## (undated) RX ORDER — FENTANYL CITRATE-0.9 % NACL/PF 10 MCG/ML
PLASTIC BAG, INJECTION (ML) INTRAVENOUS
Status: DISPENSED
Start: 2024-06-24

## (undated) RX ORDER — PROPOFOL 10 MG/ML
INJECTION, EMULSION INTRAVENOUS
Status: DISPENSED
Start: 2024-06-24

## (undated) RX ORDER — METHADONE HYDROCHLORIDE 10 MG/ML
INJECTION, SOLUTION INTRAMUSCULAR; INTRAVENOUS; SUBCUTANEOUS
Status: DISPENSED
Start: 2024-07-11

## (undated) RX ORDER — HEPARIN SODIUM (PORCINE) LOCK FLUSH IV SOLN 100 UNIT/ML 100 UNIT/ML
SOLUTION INTRAVENOUS
Status: DISPENSED
Start: 2024-06-24

## (undated) RX ORDER — CEFAZOLIN SODIUM/WATER 2 G/20 ML
SYRINGE (ML) INTRAVENOUS
Status: DISPENSED
Start: 2024-06-24

## (undated) RX ORDER — ONDANSETRON 2 MG/ML
INJECTION INTRAMUSCULAR; INTRAVENOUS
Status: DISPENSED
Start: 2024-06-24

## (undated) RX ORDER — CEFAZOLIN SODIUM/WATER 2 G/20 ML
SYRINGE (ML) INTRAVENOUS
Status: DISPENSED
Start: 2024-07-11

## (undated) RX ORDER — LIDOCAINE HYDROCHLORIDE 10 MG/ML
INJECTION, SOLUTION EPIDURAL; INFILTRATION; INTRACAUDAL; PERINEURAL
Status: DISPENSED
Start: 2024-07-11

## (undated) RX ORDER — BUPIVACAINE HYDROCHLORIDE 2.5 MG/ML
INJECTION, SOLUTION EPIDURAL; INFILTRATION; INTRACAUDAL
Status: DISPENSED
Start: 2024-07-11

## (undated) RX ORDER — HYDROMORPHONE HCL IN WATER/PF 6 MG/30 ML
PATIENT CONTROLLED ANALGESIA SYRINGE INTRAVENOUS
Status: DISPENSED
Start: 2024-06-24

## (undated) RX ORDER — KETOROLAC TROMETHAMINE 30 MG/ML
INJECTION, SOLUTION INTRAMUSCULAR; INTRAVENOUS
Status: DISPENSED
Start: 2024-07-11

## (undated) RX ORDER — KETOROLAC TROMETHAMINE 30 MG/ML
INJECTION, SOLUTION INTRAMUSCULAR; INTRAVENOUS
Status: DISPENSED
Start: 2024-06-24

## (undated) RX ORDER — OXYCODONE HYDROCHLORIDE 5 MG/1
TABLET ORAL
Status: DISPENSED
Start: 2024-06-24